# Patient Record
Sex: MALE | Race: WHITE
[De-identification: names, ages, dates, MRNs, and addresses within clinical notes are randomized per-mention and may not be internally consistent; named-entity substitution may affect disease eponyms.]

---

## 2017-07-07 ENCOUNTER — HOSPITAL ENCOUNTER (OUTPATIENT)
Dept: HOSPITAL 45 - C.LAB | Age: 77
Discharge: HOME | End: 2017-07-07
Attending: ORTHOPAEDIC SURGERY
Payer: COMMERCIAL

## 2017-07-07 VITALS
HEIGHT: 65 IN | BODY MASS INDEX: 25.75 KG/M2 | WEIGHT: 154.54 LBS | BODY MASS INDEX: 25.75 KG/M2 | HEIGHT: 65 IN | WEIGHT: 154.54 LBS

## 2017-07-07 DIAGNOSIS — Z01.812: ICD-10-CM

## 2017-07-07 DIAGNOSIS — Z01.811: Primary | ICD-10-CM

## 2017-07-07 LAB
ANION GAP SERPL CALC-SCNC: 6 MMOL/L (ref 3–11)
APPEARANCE UR: CLEAR
BASOPHILS # BLD: 0.02 K/UL (ref 0–0.2)
BASOPHILS NFR BLD: 0.3 %
BILIRUB UR-MCNC: (no result) MG/DL
BUN SERPL-MCNC: 25 MG/DL (ref 7–18)
BUN/CREAT SERPL: 20.6 (ref 10–20)
CALCIUM SERPL-MCNC: 9 MG/DL (ref 8.5–10.1)
CHLORIDE SERPL-SCNC: 106 MMOL/L (ref 98–107)
CO2 SERPL-SCNC: 29 MMOL/L (ref 21–32)
COLOR UR: YELLOW
COMPLETE: YES
CREAT CL PREDICTED SERPL C-G-VRATE: 44.8 ML/MIN
CREAT SERPL-MCNC: 1.2 MG/DL (ref 0.6–1.4)
EOSINOPHIL NFR BLD AUTO: 330 K/UL (ref 130–400)
GLUCOSE SERPL-MCNC: 188 MG/DL (ref 70–99)
HCT VFR BLD CALC: 31.2 % (ref 42–52)
IG%: 0.2 %
IMM GRANULOCYTES NFR BLD AUTO: 12.6 %
INR PPP: 1.1 (ref 0.9–1.1)
LYMPHOCYTES # BLD: 0.77 K/UL (ref 1.2–3.4)
MANUAL MICROSCOPIC REQUIRED?: NO
MCH RBC QN AUTO: 27.3 PG (ref 25–34)
MCHC RBC AUTO-ENTMCNC: 30.8 G/DL (ref 32–36)
MCV RBC AUTO: 88.6 FL (ref 80–100)
MONOCYTES NFR BLD: 8 %
NEUTROPHILS # BLD AUTO: 0.8 %
NEUTROPHILS NFR BLD AUTO: 78.1 %
NITRITE UR QL STRIP: (no result)
PARTIAL THROMBOPLASTIN RATIO: 1.5
PH UR STRIP: 5 [PH] (ref 4.5–7.5)
PMV BLD AUTO: 9.7 FL (ref 7.4–10.4)
POTASSIUM SERPL-SCNC: 4.1 MMOL/L (ref 3.5–5.1)
PROTHROMBIN TIME: 11.6 SECONDS (ref 9–12)
RBC # BLD AUTO: 3.52 M/UL (ref 4.7–6.1)
REVIEW REQ?: NO
SODIUM SERPL-SCNC: 141 MMOL/L (ref 136–145)
SP GR UR STRIP: 1.03 (ref 1–1.03)
URINE BILL WITH OR WITHOUT MIC: (no result)
UROBILINOGEN UR-MCNC: (no result) MG/DL
WBC # BLD AUTO: 6.13 K/UL (ref 4.8–10.8)
ZZUR CULT IF INDIC CLEAN CATCH: NO

## 2017-07-07 NOTE — PAT MEDICATION INSTRUCTIONS
Service Date


Jul 7, 2017.





Current Home Medication List


Acetaminophen (Tylenol), 1,300 MG PO Q6H PRN for RN


Amlodipine (Norvasc), 5 MG PO HS


Atorvastatin (Lipitor), 10 MG PO QPM


Celecoxib (CeleBREX), 200 MG PO PRN


Finasteride (Proscar), 5 MG PO HS


Multivitamin (Multivitamin), 1 TABLET PO QAM


Oxycodone/Acetaminophen 5MG/325MG (Percocet 5MG/325MG), 1 TABLET PO Q4H PRN for 

Pain


Terazosin Hcl (Hytrin), 10 MG PO HS





Medication Instructions


For Your Scheduled Surgery 





- Hold the following medications the morning of surgery:


Celecoxib (CeleBREX), 200 MG PO PRN (otherwise okay to continue per surgeon)


Multivitamin (Multivitamin), 1 TABLET PO QAM











- Take the following medications the morning of surgery with a sip of water 

OTHERWISE NOTHING TO EAT OR DRINK AFTER MIDNIGHT:


Acetaminophen (Tylenol), 1,300 MG PO Q6H PRN  (may take if needed up to 4 hours 

prior to surgery)


Oxycodone/Acetaminophen 5MG/325MG (Percocet 5MG/325MG), 1 TABLET PO Q4H PRN for 

Pain (may take if needed up to 4 hours prior to surgery)











- Take the following medications as scheduled the night before surgery:


Amlodipine (Norvasc), 5 MG PO HS


Terazosin Hcl (Hytrin), 10 MG PO HS


Atorvastatin (Lipitor), 10 MG PO QPM


Finasteride (Proscar), 5 MG PO HS


Acetaminophen (Tylenol), 1,300 MG PO Q6H PRN 


Oxycodone/Acetaminophen 5MG/325MG (Percocet 5MG/325MG),











If you have any questions please call us at [phone] or [phone] or 

[phone]

## 2017-07-07 NOTE — DIAGNOSTIC IMAGING REPORT
CHEST 2 VIEWS ROUTINE



HISTORY:77 yearsMal[age] exam. Patient is asymptomatic. 



COMPARISON: None available.



TECHNIQUE: Frontal and lateral views of the chest.



FINDINGS: 

Cardiomediastinal and hilar silhouettes are within normal limits. There is

atherosclerosis of the aorta. No pneumothorax, pleural effusion or focal

airspace consolidation. No overt pulmonary edema. Lungs are mildly

hyperinflated.



Metallic anchor device of the right humeral head is partially imaged from prior

rotator cuff repair. There has been prior osteotomy of the left distal clavicle.



IMPRESSION: Mild hyperinflation without acute cardiopulmonary process. 







The above report was generated using voice recognition software. It may contain

grammatical, syntax or spelling errors.







Electronically signed by:  Darren Darling

7/7/2017 3:57 PM



Dictated Date/Time:  7/7/2017 3:56 PM

## 2017-07-08 LAB — EST. AVERAGE GLUCOSE BLD GHB EST-MCNC: 134 MG/DL

## 2017-07-11 NOTE — HISTORY AND PHYSICAL
History & Physical


Date & Time of Service:


Jul 11, 2017 at 10:29


Chief Complaint:


Right Hip Degenerative Joint Disease


Primary Care Physician:


Adelfo Dacosta D.O.


History of Present Illness


Source:  patient


Chronic rt hip pain, failed injections, narcotics, tylenol and the use of a 

cane. Dx w end staged OA rt hip radiographically.





Past Medical/Surgical History


HTN


Hyperlipidemia


OA


Acid reflux


BPH


Dental implants





Social History


Smoking Status:  Former Smoker


Alcohol Use:  occasionally


Drug Use:  none


Marital Status:  


Housing status:  lives with family





Multi-Drug Resistant Organisms


History of MDRO:  No





Allergies


Coded Allergies:  


     Lisinopril (Unverified  Allergy, Unknown, COUGH, 7/7/17)





Home Medications


Scheduled


Amlodipine (Norvasc), 5 MG PO HS


Atorvastatin (Lipitor), 10 MG PO QPM


Celecoxib (CeleBREX), 200 MG PO PRN


Finasteride (Proscar), 5 MG PO HS


Multivitamin (Multivitamin), 1 TABLET PO QAM


Terazosin Hcl (Hytrin), 10 MG PO HS





Scheduled PRN


Acetaminophen (Tylenol), 1,300 MG PO Q6H PRN for RN


Oxycodone/Acetaminophen 5MG/325MG (Percocet 5MG/325MG), 1 TABLET PO Q4H PRN for 

Pain





Review of Systems


Musculoskeletal:  + joint pain, + muscle pain (rt hip)





Physical Exam


General Appearance:  WD/WN, no apparent distress


Head:  normocephalic, atraumatic


Eyes:  normal inspection, PERRL


ENT:  normal ENT inspection


Respiratory/Chest:  lungs clear


Cardiovascular:  regular rate, rhythm


Abdomen/GI:  normal bowel sounds, non tender


+pain with PROM at the rt hip, + flexion contracture, rt hip, N/V+, 4/5 

strength w pain





Impression


Assessment and Plan


Right hip OA


HTN


Hyperlipidemia


OA


Acid reflux


BPH


Dental implants





Advanced Directives


Existing Living Will:  No


Existing Power of :  No





Note


Plan for Right PIETRO

## 2017-08-28 LAB
INR PPP: 1 (ref 0.9–1.1)
PARTIAL THROMBOPLASTIN RATIO: 1.4
PROTHROMBIN TIME: 10.6 SECONDS (ref 9–12)

## 2017-08-28 NOTE — DIAGNOSTIC IMAGING REPORT
LATERAL RADIOGRAPHS OF THE CERVICAL SPINE, INCLUDING FLEXION AND EXTENSION



CLINICAL HISTORY: PREOP, RHEUMATOID ARTHRITIS    



COMPARISON STUDY:  No previous studies for comparison.



FINDINGS: Slight anterolisthesis of C7 on T1 is noted. This does not change with

flexion or extension. C1-C2 alignment is anatomic. There is no evidence for

cervical spine instability during flexion or extension. There is moderate disc

space narrowing and osteophytosis at C5-6 and C6-C7. No fracture or suspicious

lesion is identified on lateral projections.



IMPRESSION:  



1. No evidence for cervical spine instability during flexion or extension.



2. Moderate multilevel degenerative disc disease and facet arthrosis, most

pronounced at C5-C6 and C6-C7.



3. Slight anterolisthesis of C7 on T1 which does not change with flexion or

extension. 









Electronically signed by:  Devon Casiano M.D.

8/28/2017 4:03 PM



Dictated Date/Time:  8/28/2017 4:02 PM

## 2017-08-28 NOTE — PAT MEDICATION INSTRUCTIONS
Service Date


Aug 28, 2017.





Current Home Medication List


Acetaminophen (Acetaminophen Extra Stren), 2 TAB PO Q6 PRN for Pain


Ferrous Sulfate (Kp Ferrous Sulfate), 1 TAB PO BID


Finasteride (Proscar), 5 MG PO HS


Folic Acid (Folic Acid), 1 TAB PO QAM


Methotrexate (Methotrexate), 2.5 MG PO WK


Multivitamin (Multivitamin), 1 TABLET PO QAM


Omeprazole (Prilosec), 20 MG PO QAM


Pseudoephedrine (Sudafed), 30 MG PO DAILY PRN for stuffiness


Terazosin Hcl (Hytrin), 10 MG PO HS


Tramadol (Ultram), 50 MG PO Q4H PRN for Pain





Medication Instructions


For Your Scheduled Surgery 





- Check with surgeon/rheumatologist for instructions: 


Methotrexate (Methotrexate), 2.5 MG PO WK








- Hold the following medications the morning of surgery:


Pseudoephedrine (Sudafed), 30 MG PO DAILY PRN for stuffiness


Multivitamin (Multivitamin), 1 TABLET PO QAM


Folic Acid (Folic Acid), 1 TAB PO QAM


Ferrous Sulfate (Kp Ferrous Sulfate), 1 TAB PO BID








- Take the following medications the morning of surgery with a sip of water:


Acetaminophen (Acetaminophen Extra Stren), 2 TAB PO Q6 PRN for Pain  (if needed)


Omeprazole (Prilosec), 20 MG PO QAM


Tramadol (Ultram), 50 MG PO Q4H PRN for Pain(okay to take up to 4 hours prior 

to surgery if needed)








- Take the following medications as scheduled the night before surgery:


Tramadol (Ultram), 50 MG PO Q4H PRN for Pain   (if needed)


Terazosin Hcl (Hytrin), 10 MG PO HS


Pseudoephedrine (Sudafed), 30 MG PO DAILY PRN for stuffiness   (if needed)


Finasteride (Proscar), 5 MG PO HS


Ferrous Sulfate (Kp Ferrous Sulfate), 1 TAB PO BID








If you have any questions please call us at [phone] or [phone] or 

[phone]

## 2017-09-05 NOTE — HISTORY & PHYSICAL EXAMINATION
DATE OF ADMISSION:  09/05/2017

 

CHIEF COMPLAINT:  Chronic right hip pain.

 

HISTORY OF PRESENT ILLNESS:  This is a 77-year-old male patient of Dr. Cooper, who is complaining of chronic right hip pain, longstanding, now

progressively getting worse.  The patient has been diagnosed with end-stage

osteoarthritis per clinical and radiographic exams.  The patient has failed

conservative treatment including, intraarticular injections, use of narcotic

medication, see the medicine and the use of a walker.  The patient has

increased pain with weightbearing activities.  His pain does interfere with

his activities of daily living.  The patient wishes to proceed with a right

total hip arthroplasty.

 

PAST MEDICAL HISTORY:  Osteoarthritis, acid reflux, BPH, otherwise a healthy

77-year-old male.

 

SOCIAL HISTORY:  Nonsmoker, occasional drinker.

 

PAST SURGICAL HISTORY:  Shoulder surgery x3, hernia surgery, and carpal

tunnel surgery.

 

FAMILY HISTORY:  Noncontributory.

 

REVIEW OF SYSTEMS:  The patient complains of chronic right hip pain. 

Otherwise, denies any shortness of breath, chest pain, nausea, vomiting or

any other joint complaints.

 

MEDICATIONS:

1.  Finasteride 5 mg daily.

2.  Terazosin 10 mg daily.

3.  Methotrexate 2.5 mg six per week.

4.  Omeprazole 20 mg daily.

5.  Tylenol as needed.

6.  Tramadol as needed.

7.  Folic acid 1 mg daily.

8.  Ferrous sulfate 325 b.i.d.

 

ALLERGIES:  No known drug allergies.

 

PHYSICAL EXAMINATION:

GENERAL:  Well-developed, well-nourished 77-year-old male, in no acute

distress.  He is alert and oriented x3 and pleasant.

HEENT:  Normocephalic, atraumatic.  Extraocular motions are intact.  Pupils

are equal and reactive to light.

HEART:  Regular rate and rhythm, no murmurs appreciated.

LUNGS:  Clear.

ABDOMEN:  Soft, nontender, bowel sounds present.

EXTREMITIES:  Right hip reveals a range of motion of negative 15 to 115.  He

does have crepitation, soreness with pain to passive range of motion.  He has

4/5 strength in his lower right extremity.

NEUROLOGIC:  Neurovascularly, he is intact in his right lower extremity.

 

DIAGNOSES:

1.  Right hip end-stage osteoarthritis, failure of conservative treatment

with a history of osteoarthritis.

2.  Acid reflux.

3.  Benign prostatic hyperplasia.

 

PLAN:  The patient was advised of his diagnosis.  Indications, risks,

benefits, and postop course have all been reviewed.  The patient wishes to

proceed with right total hip arthroplasty.  Necessary consent forms,

preoperative testing and clearances will be obtained.

## 2017-09-06 ENCOUNTER — HOSPITAL ENCOUNTER (INPATIENT)
Dept: HOSPITAL 45 - C.ACU | Age: 77
LOS: 3 days | Discharge: HOME HEALTH | DRG: 470 | End: 2017-09-09
Attending: ORTHOPAEDIC SURGERY | Admitting: ORTHOPAEDIC SURGERY
Payer: COMMERCIAL

## 2017-09-06 VITALS
TEMPERATURE: 99.14 F | HEART RATE: 88 BPM | SYSTOLIC BLOOD PRESSURE: 129 MMHG | DIASTOLIC BLOOD PRESSURE: 85 MMHG | OXYGEN SATURATION: 94 %

## 2017-09-06 VITALS
OXYGEN SATURATION: 96 % | SYSTOLIC BLOOD PRESSURE: 94 MMHG | TEMPERATURE: 97.7 F | DIASTOLIC BLOOD PRESSURE: 54 MMHG | HEART RATE: 82 BPM

## 2017-09-06 VITALS — DIASTOLIC BLOOD PRESSURE: 69 MMHG | SYSTOLIC BLOOD PRESSURE: 105 MMHG

## 2017-09-06 VITALS
DIASTOLIC BLOOD PRESSURE: 70 MMHG | HEART RATE: 84 BPM | TEMPERATURE: 98.96 F | SYSTOLIC BLOOD PRESSURE: 118 MMHG | OXYGEN SATURATION: 98 %

## 2017-09-06 VITALS
SYSTOLIC BLOOD PRESSURE: 120 MMHG | DIASTOLIC BLOOD PRESSURE: 70 MMHG | HEART RATE: 68 BPM | TEMPERATURE: 97.88 F | OXYGEN SATURATION: 99 %

## 2017-09-06 VITALS
OXYGEN SATURATION: 99 % | HEART RATE: 71 BPM | DIASTOLIC BLOOD PRESSURE: 80 MMHG | TEMPERATURE: 97.7 F | SYSTOLIC BLOOD PRESSURE: 131 MMHG

## 2017-09-06 VITALS
TEMPERATURE: 97.7 F | HEART RATE: 70 BPM | OXYGEN SATURATION: 98 % | DIASTOLIC BLOOD PRESSURE: 66 MMHG | SYSTOLIC BLOOD PRESSURE: 111 MMHG

## 2017-09-06 VITALS
BODY MASS INDEX: 24.5 KG/M2 | WEIGHT: 147.05 LBS | WEIGHT: 147.05 LBS | BODY MASS INDEX: 24.5 KG/M2 | HEIGHT: 65 IN | HEIGHT: 65 IN | BODY MASS INDEX: 24.5 KG/M2

## 2017-09-06 VITALS
SYSTOLIC BLOOD PRESSURE: 100 MMHG | DIASTOLIC BLOOD PRESSURE: 54 MMHG | OXYGEN SATURATION: 96 % | HEART RATE: 70 BPM | TEMPERATURE: 98.06 F

## 2017-09-06 DIAGNOSIS — M06.9: ICD-10-CM

## 2017-09-06 DIAGNOSIS — K21.9: ICD-10-CM

## 2017-09-06 DIAGNOSIS — Z79.52: ICD-10-CM

## 2017-09-06 DIAGNOSIS — N40.0: ICD-10-CM

## 2017-09-06 DIAGNOSIS — Z82.49: ICD-10-CM

## 2017-09-06 DIAGNOSIS — M16.11: Primary | ICD-10-CM

## 2017-09-06 DIAGNOSIS — Z87.891: ICD-10-CM

## 2017-09-06 DIAGNOSIS — D62: ICD-10-CM

## 2017-09-06 DIAGNOSIS — Z51.81: ICD-10-CM

## 2017-09-06 DIAGNOSIS — Z79.899: ICD-10-CM

## 2017-09-06 PROCEDURE — 0SR90JZ REPLACEMENT OF RIGHT HIP JOINT WITH SYNTHETIC SUBSTITUTE, OPEN APPROACH: ICD-10-PCS | Performed by: ORTHOPAEDIC SURGERY

## 2017-09-06 RX ADMIN — CEFAZOLIN SCH MLS/HR: 10 INJECTION, POWDER, FOR SOLUTION INTRAVENOUS at 20:12

## 2017-09-06 RX ADMIN — OXYCODONE HYDROCHLORIDE SCH MG: 10 TABLET, FILM COATED, EXTENDED RELEASE ORAL at 18:16

## 2017-09-06 RX ADMIN — FINASTERIDE SCH MG: 5 TABLET, FILM COATED ORAL at 21:08

## 2017-09-06 RX ADMIN — TRANEXAMIC ACID SCH MLS/HR: 100 INJECTION, SOLUTION INTRAVENOUS at 11:18

## 2017-09-06 RX ADMIN — DEXTROSE MONOHYDRATE, SODIUM CHLORIDE, AND POTASSIUM CHLORIDE SCH MLS/HR: 50; 4.5; 1.49 INJECTION, SOLUTION INTRAVENOUS at 17:20

## 2017-09-06 RX ADMIN — ACETAMINOPHEN SCH MG: 500 TABLET, COATED ORAL at 21:40

## 2017-09-06 RX ADMIN — DOCUSATE SODIUM SCH MG: 100 CAPSULE, LIQUID FILLED ORAL at 21:09

## 2017-09-06 RX ADMIN — FERROUS GLUCONATE SCH MG: 324 TABLET ORAL at 18:16

## 2017-09-06 RX ADMIN — TRANEXAMIC ACID SCH MLS/HR: 100 INJECTION, SOLUTION INTRAVENOUS at 17:28

## 2017-09-06 NOTE — MEDICAL CONSULT
Consultation


Date of Consultation:


Sep 6, 2017.


Attending Physician:


Flo Cooper M.D.


Reason for Consultation:


Post Op Medical Management


History of Present Illness


77 year old male who is s/p right PIETRO today by Dr. Cooper. Patient has been 

having increasing right hip pain for the past several months. He failed 

outpatient conservative measures and therefore presented for the planned 

procedure today. Post operatively the patient is doing well. He reports his 

pain is well controlled. He denies numbness or tingling to the RLE. No chest 

pain or shortness of breath. He denies lightheadedness and dizziness. No 

abdominal pain or nausea. He has been ambulating in the halls.





Past Medical/Surgical History


Medical Problems:


(1) BPH (benign prostatic hyperplasia)


Status: Chronic  





(2) Renal cyst


Status: Chronic  





(3) Rheumatoid arthritis


Status: Chronic  





Surgical Problems:


(1) H/O inguinal hernia repair


Status: Chronic  





(2) Hx of nasal septoplasty


Status: Chronic  





(3) S/P carpal tunnel release


Status: Chronic  





(4) S/P rotator cuff repair


Status: Chronic  








Family History





FH: CAD (coronary artery disease)


  BROTHER





Social History


Smoking Status:  Former Smoker


Alcohol Use:  none





Allergies


Coded Allergies:  


     Lisinopril (Verified  Adverse Reaction, Unknown, COUGH, 9/6/17)





Home Medications


Sudafed (Pseudoephedrine HCl) 30 Mg Tab 30 Mg PO DAILY PRN


Kp Ferrous Sulfate (Ferrous Sulfate) 325 Mg Tab 1 Tab PO BID 30 Days


Folic Acid 1 Mg Tab 1 Tab PO QAM


Ultram (Tramadol HCl) 50 Mg Tab 50 Mg PO Q4H PRN


Prilosec (Omeprazole) 20 Mg Capcr 20 Mg PO QAM


Methotrexate 2.5 Mg Tab 2.5 Mg PO WK


Acetaminophen Extra Stren (Acetaminophen) 500 Mg Tab 2 Tab PO Q6 PRN


Proscar (Finasteride) 5 Mg Tab 5 Mg PO HS


Multivitamin (Multivitamins)  Tab 1 Tablet PO QAM


Hytrin (Terazosin Hcl) 10 Mg Cap 10 Mg PO HS





Current Inpatient Medications





Current Inpatient Medications








 Medications


  (Trade)  Dose


 Ordered  Sig/Juaquin


 Route  Start Time


 Stop Time Status Last Admin


Dose Admin


 


 Potassium


 Chloride/Dextrose/


 Sod Cl  1,000 ml @ 


 100 mls/hr  Q10H


 IV  9/6/17 17:00


 9/7/17 16:59  9/6/17 17:20


100 MLS/HR


 


 Miscellaneous


 Medication


  (No Nsaids)  1 ea  UD


 N/A  9/6/17 15:00


 10/6/17 14:59   


 


 


 Oxycodone HCl


  (Roxicodone


 Immediate Rel Tab)  1 TABLET


 FOR PAIN


 RATING...  Q4H  PRN


 PO  9/6/17 15:00


 9/20/17 14:59   


 


 


 Morphine Sulfate


  (MoRPHine


 SULFATE INJ)  2 mg  [id number]  PRN


 IV  9/6/17 15:00


 9/20/17 14:59   


 


 


 Acetaminophen


  (Tylenol Tab)  1,000 mg  Q8


 PO  9/6/17 22:00


 10/6/17 21:59   


 


 


 Magnesium


 Hydroxide


  (Milk Of


 Magnesia Susp)  30 ml  Q6H  PRN


 PO  9/6/17 15:00


 10/6/17 14:59   


 


 


 Docusate Sodium


  (coLACE CAP)  100 mg  BID


 PO  9/6/17 21:00


 10/6/17 20:59   


 


 


 Diphenhydramine


 HCl


  (Benadryl Cap)  25 mg  Q8H  PRN


 PO  9/6/17 15:00


 10/6/17 14:59   


 


 


 Diphenhydramine


 HCl


  (Benadryl Inj)  25 mg  Q8H  PRN


 IV  9/6/17 15:00


 10/6/17 14:59   


 


 


 Al Hydrox/Mg


 Hydrox/Simethicone


  (Maalox Max Susp)  15 ml  Q4H  PRN


 PO  9/6/17 15:00


 10/6/17 14:59   


 


 


 Zolpidem Tartrate


  (Ambien Tab)  5 mg  HSZ  PRN


 PO  9/6/17 15:00


 10/6/17 14:59   


 


 


 Multivitamins


  (Multivitamin


 Tab)  1 tab  QAM


 PO  9/7/17 09:00


 10/7/17 08:59   


 


 


 Ferrous Gluconate


  (Ferrous


 Gluconate Tab)  324 mg  TIDM


 PO  9/6/17 17:45


 10/6/17 17:59  9/6/17 18:16


324 MG


 


 Pantoprazole


 Sodium


  (Protonix Tab)  40 mg  QAM


 PO  9/7/17 09:00


 10/7/17 08:59   


 


 


 Cefazolin Sodium


 1000 mg/Dextrose  55 ml @ 


 100 mls/hr  Q8H


 IV  9/6/17 20:00


 9/7/17 04:32   


 


 


 Oxycodone HCl


  (Oxycontin Tab)  10 mg  Q12H


 PO  9/6/17 18:00


 9/20/17 17:59  9/6/17 18:16


10 MG


 


 Finasteride


  (Proscar Tab)  5 mg  HS


 PO  9/6/17 21:00


 10/6/17 20:59   


 


 


 Folic Acid


  (Folvite Tab)  1 mg  QAM


 PO  9/7/17 09:00


 10/7/17 08:59   


 


 


 Pseudoephedrine


 HCl


  (Sudafed Tab)  30 mg  DAILY  PRN


 PO  9/6/17 15:00


 10/6/17 14:59   


 


 


 Terazosin HCl


  (Hytrin Cap)  10 mg  HS


 PO  9/6/17 21:00


 10/6/17 20:59   


 


 


 Rivaroxaban


  (Xarelto Tab)  10 mg  Q24H


 PO  9/7/17 15:00


 10/7/17 14:59   


 











Review of Systems


ROS per HPI, all other systems reviewed and negative





Physical Exam











  Date Time  Temp Pulse Resp B/P (MAP) Pulse Ox O2 Delivery O2 Flow Rate FiO2


 


9/6/17 18:54 36.5 82 18 94/54 (67) 96 Room Air  


 


9/6/17 17:53 37.2 84 18 118/70 (86) 98 Nasal Cannula 2.0 


 


9/6/17 16:50 36.5 70 18 111/66 (81) 98 Nasal Cannula 2.0 


 


9/6/17 16:28 36.5 71 18 131/80 (97) 99 Nasal Cannula 2.0 


 


9/6/17 16:15      Nasal Cannula 2.0 


 


9/6/17 15:45     99 Nasal Cannula 2.0 


 


9/6/17 15:45 36.6 68 18 120/70 (87) 99 Nasal Cannula 2.0 


 


9/6/17 15:39 37.5       


 


9/6/17 15:37  66 15  99   


 


9/6/17 15:37  65 15     


 


9/6/17 15:36    126/77    


 


9/6/17 15:32  66 17  99   


 


9/6/17 15:32  66 17     


 


9/6/17 15:31    128/76    


 


9/6/17 15:27  66 20     


 


9/6/17 15:27  66 20  97   


 


9/6/17 15:26    128/76    


 


9/6/17 15:22  66 15     


 


9/6/17 15:22  66 15  97   


 


9/6/17 15:21    121/72    


 


9/6/17 15:17  65 20     


 


9/6/17 15:17  66 20  97   


 


9/6/17 15:16    119/71    


 


9/6/17 15:12  68 14  95   


 


9/6/17 15:12  65 14     


 


9/6/17 15:11    127/73    


 


9/6/17 15:08 37.5       


 


9/6/17 15:07  64 13  97   


 


9/6/17 15:07  65 13     


 


9/6/17 15:06  64 18     


 


9/6/17 15:06  64 18 112/76 97   


 


9/6/17 15:01  64 15     


 


9/6/17 15:01  65 15 120/70 98   


 


9/6/17 15:00  63 16  98   


 


9/6/17 15:00  63 16     


 


9/6/17 14:56    122/74    


 


9/6/17 14:55  63 13  98   


 


9/6/17 14:55  64 13     


 


9/6/17 14:51    107/71    


 


9/6/17 14:50  64 14     


 


9/6/17 14:50  63 14  97   


 


9/6/17 14:49  62 16  98   


 


9/6/17 14:49  62 16     


 


9/6/17 14:46    124/71    


 


9/6/17 14:44  67 23  97   


 


9/6/17 14:44  67 23     


 


9/6/17 14:41    118/67    


 


9/6/17 14:40    116/73    


 


9/6/17 14:39  75 22  92   


 


9/6/17 14:39  70 22     


 


9/6/17 14:39 37.2 71 16 116/73 95 Nasal Cannula 2 


 


9/6/17 09:51 37.3 88 20 129/85 94 Room Air  








General Appearance:  no apparent distress


Head:  normocephalic, atraumatic


Eyes:  normal inspection, sclerae normal


ENT:  hearing grossly normal


Neck:  supple, no JVD


Respiratory/Chest:  lungs clear, normal breath sounds, no respiratory distress


Cardiovascular:  regular rate, rhythm, no edema, normal peripheral pulses


Abdomen/GI:  normal bowel sounds, non tender, soft


Extremities/Musculoskelatal:  + pertinent finding (s/p right hip surgery, 

surgical dressing intact, CSM checks intact to RLE )


Neurologic/Psych:  no motor/sensory deficits, alert, normal mood/affect, 

oriented x 3


Skin:  normal color, warm/dry





Assessment & Plan


S/P RIGHT PIETRO


- POD#0


- activity and wound care orders as per ortho


- pain control with bowel regimen


- PT/OT


- monitor H/H for acute blood loss anemia and transfuse blood products PRN





RHEUMATOID ARTHRITIS


- methotrexate on hold during the immediate post op period 





BPH


- continue terazosin and finasteride 





DVT PROPHYLAXIS 


- Xarelto as per ortho 





Thank you for this consultation.


We will follow the patient with you during their hospital stay.


You can reach a member of the [facility] Hospitalist Team 24/7 via pager @ [phone].





Attending addendum:





Agree with the above H&P consultation; please refer to above for more details. 





Patient is a 76 yo male who is post op from R PIETRO; he states that he was in 

severe pain prior to surgery and had failed conservative measures. He was just 

seen ambulating in the halls with a walker and minimum assistance. He denies 

any complaints other than a pulling sensation at the incision/drain sites. No 

pain. 





Cardiac: RR, S1 and S2 auscultated


Resp: CTA B/L no wheezes, rales, rhonchi


GI: soft, NT, ND, +BS





POSTOPERATIVE STATE: S/P R PIETRO


-pain control, activity, DVT prophylaxis as per primary team


-will monitor for post op anemia of blood loss


-encourage bowel regimen


-encourage IS 


-resume home meds as above

## 2017-09-06 NOTE — DIAGNOSTIC IMAGING REPORT
SINGLE VIEW PELVIS; SINGLE VIEW RIGHT HIP



CLINICAL HISTORY: Postoperative examination.



FINDINGS: An AP portable view of the hips and pelvis with a crosstable lateral

portable view of the right hip are obtained.  A bipolar right hip arthroplasty

is in near-anatomic alignment. At least 2 cortical lag screws transfixes the

acetabular cup. No acute fracture is identified. Mild arthritic change is seen

in the left hip. There are expected postoperative changes overlying the right

hip including skin clips, subcutaneous gas, a surgical drain, and soft tissue

swelling. Calcified phleboliths are seen in the pelvis.



IMPRESSION: Expected postoperative findings status post right hip arthroplasty.

No acute fracture is seen.







Electronically signed by:  Arnaldo Handy M.D.

9/6/2017 3:33 PM



Dictated Date/Time:  9/6/2017 3:33 PM

## 2017-09-06 NOTE — ANESTHESIOLOGY PROGRESS NOTE
Anesthesia Post Op Note


Date & Time


Sep 6, 2017 at 14:59





Vital Signs


Pain Intensity:  0





Vital Signs Past 12 Hours








  Date Time  Temp Pulse Resp B/P (MAP) Pulse Ox O2 Delivery O2 Flow Rate FiO2


 


9/6/17 14:49  62 16  98   


 


9/6/17 14:49  62 16     


 


9/6/17 14:46    124/71    


 


9/6/17 14:44  67 23  97   


 


9/6/17 14:44  67 23     


 


9/6/17 14:41    118/67    


 


9/6/17 14:40    116/73    


 


9/6/17 14:39  75 22  92   


 


9/6/17 14:39  70 22     


 


9/6/17 14:39 37.2 71 16 116/73 95 Nasal Cannula 2 


 


9/6/17 09:51 37.3 88 20 129/85 94 Room Air  











Notes


Mental Status:  alert / awake / arousable, participated in evaluation


Pt Amnestic to Procedure:  Yes


Nausea / Vomiting:  adequately controlled


Pain:  adequately controlled


Airway Patency, RR, SpO2:  stable & adequate


BP & HR:  stable & adequate


Hydration State:  stable & adequate


Neuraxial Anesthesia:  was administered, sensory block is resolving


Anesthetic Complications:  no major complications apparent

## 2017-09-06 NOTE — [facility] OPERATIVE REPORT
Operative Report


Operative Date


Sep 6, 2017.





Pre-Operative Diagnosis





Right hip end-stage osteoarthritis





Post-Operative Diagnosis


same





Procedure(s) Performed


Right total hip arthroplasty





Surgeon


Dr Cooper





Assistant Surgeon(s)


Mekhi Santos PA-C





Estimated Blood Loss


100cc





Findings


end stage osteoarthritis arthritis and rheumatoid arthritis with grade 4 

degenerative arthritis possible traumatic chondral injury to acetabulum





Specimens





As per Surgeon





A. Right Femoral Head





Drains


2 Hemovac





Anesthesia


spinal, sedation,orhtomix





Complication(s)


None





Disposition


Recovery Room / PACU





Indications


End-stage arthritis right hip





Description of Procedure


Patient taken to the operating room and anesthetized under spinal anesthesia.  

Patient was placed supine on the operating table.  Exam of the involved 

extremity demonstrated he had some decreased internal rotation and flexion he 

also has some flexion contracture of his right knee.  He was a thin 

individual..  Patient was placed on a sacral pad the involved leg was placed on 

a foot bump to flex knee 90 and  hip 60.  A Gates-type approach was 

performed to the right hip.  A longitudinal lateral incision was made over the 

hip.  The skin was incised sharply.  The fat was divided down to the fascia.  

Subcutaneous bleeders are cauterized.  There was some thickened trochanteric 

bursa.  Trochanter bursa was resected.  The gluteus medius was noted to be 

intact..  A split was made in the gluteus medius muscle between the anterior 40

% and posterior 60%.  The minimus was divided longitudinally reflected off the 

underlying capsule.  The capsule was incised down to the hip joint.  Intra-

articular findings demonstrated grade 4 wear on the superior head of the femur 

with surrounding osteophytes and the acetabulum had a large cleft with some 

articular cartilage loss possibly from old trauma and a hypertrophic labrum 

with circumferential osteophytes and synovium that had significant synovitis 

and inflammatory changes consistent with rheumatoid arthritis..  An incision 

was made through the gluteus medius leaving a cuff of tendon for repair on the 

greater trochanter.  The vastus lateralis was split longitudinally for about 3 

cm.  A muscular capsular flap was elevated off the hip.  The hip was dislocated 

with use of bone hook with flexion and external rotation.  The femoral neck cut 

was made approximately 15 mm proximal to the lesser trochanter in neutral 

anteversion.  Head and neck fragment were removed.  T A sharp Swapna superior 

tractor was placed and a a blunt Swapna retractor was placed anteriorly a double 

angled inferior retractor was placed on the ishium.   The acetabular labrum was 

resected all osteophytes were resected soft tissue in the acetabular fossa was 

resected.  An intracapsular release was performed.  Some the capsule was 

resected for exposure.  The first reamer  48 mm was used to medialize reaming 

to the inner table and then sequential reamers for the acetabulum were used in 

2 mm increments up to a size 58 mm reamer.  I used the [employer] total hip 

arthroplasty system using a PSL type cup.  Trial reduction demonstrated a 58  

millimeter cup was the appropriate size and fit.  The placement of the final 

implant was performed after irrigating the acetabulum with antibiotic solution 

with pulsatile lavage.  The position of the cup was approximately 15 

anteversion 45 abduction.  Good fixation was performed.  2 screws were placed 

in the posterior superior quadrant for further fixation through the cup.  The 

acetabular liner was impacted into position.  The 36 mm G 10  acetabular liner 

was used.  Retractors removed and attention was taken to the femur.  The femur 

was exposed with flexion external rotation.  Canal reamer was used followed by 

sequential broaches up to a size 4 with 127 neck angle.  This had a good fit 

and fill.  Trial reduction was performed on 127 neck angle based on 

preoperative templating.  A +2.5 neck length gave equal leg lengths and stable 

range of motion through full flexion flexion adduction and internal rotation 

and extension and external rotation.  The trials removed a Betadine soak was 

performed the hip was copiously irrigated with antibiotic solution with 

bacitracin the anesthetic cocktail was injected and after irrigation again and 

the final implant was impacted which was the Accolade 2 size #4 with 127 neck 

angle.  The Biolox ceramic head size 36 with +2.5 mm neck was used.  After 

final implants were placed the reduction was noted to be stable.  2 drains were 

placed deep.  These were brought out laterally and connected to Hemovac.  The 

minimus was closed with interrupted figure-of-eight #1 Vicryl sutures.  The 

medius was closed with transosseous #5 FiberWire sutures using Seamus Wilmer 

stitch technique.  Lateral row soft tissue repair was performed with figure of 

8 #2 FiberWire sutures.  The medius split was closed with interrupted figure-of-

eight #1 Vicryl sutures.  The vastus lateralis was closed with interrupted 

figure eight Vicryl sutures.  The fascia sunil was closed with interrupted 

figure of 8 number 1 Vicryl sutures.  The fat was closed with figure-of-eight #

2 Vicryl sutures.  Skin was closed with staples sterile silvalon dressing was 

applied.  The patient tolerated procedure well.Mekhi WELSH , my physician 

assistant assisted me in the procedure with patient positioning And draping 

soft tissue retraction instrument management suture management and assisted in 

the outer layer closure and will participate in the postoperative care the 

patient thank you.


I attest to the content of the Intraoperative Record and any orders documented 

therein.  Any exceptions are noted below.

## 2017-09-07 VITALS
DIASTOLIC BLOOD PRESSURE: 68 MMHG | TEMPERATURE: 98.24 F | SYSTOLIC BLOOD PRESSURE: 145 MMHG | OXYGEN SATURATION: 95 % | HEART RATE: 98 BPM

## 2017-09-07 VITALS
TEMPERATURE: 99.14 F | SYSTOLIC BLOOD PRESSURE: 128 MMHG | OXYGEN SATURATION: 95 % | HEART RATE: 85 BPM | DIASTOLIC BLOOD PRESSURE: 69 MMHG

## 2017-09-07 VITALS — SYSTOLIC BLOOD PRESSURE: 104 MMHG | HEART RATE: 91 BPM | DIASTOLIC BLOOD PRESSURE: 64 MMHG | OXYGEN SATURATION: 98 %

## 2017-09-07 VITALS
SYSTOLIC BLOOD PRESSURE: 115 MMHG | DIASTOLIC BLOOD PRESSURE: 66 MMHG | TEMPERATURE: 97.7 F | OXYGEN SATURATION: 96 % | HEART RATE: 67 BPM

## 2017-09-07 VITALS
TEMPERATURE: 98.24 F | HEART RATE: 81 BPM | SYSTOLIC BLOOD PRESSURE: 103 MMHG | DIASTOLIC BLOOD PRESSURE: 55 MMHG | OXYGEN SATURATION: 96 %

## 2017-09-07 VITALS
HEART RATE: 65 BPM | OXYGEN SATURATION: 95 % | SYSTOLIC BLOOD PRESSURE: 127 MMHG | TEMPERATURE: 98.24 F | DIASTOLIC BLOOD PRESSURE: 60 MMHG

## 2017-09-07 LAB
ANION GAP SERPL CALC-SCNC: 7 MMOL/L (ref 3–11)
ANISOCYTOSIS BLD QL SMEAR: PRESENT
BASOPHILS # BLD: 0.01 K/UL (ref 0–0.2)
BASOPHILS NFR BLD: 0.1 %
BUN SERPL-MCNC: 23 MG/DL (ref 7–18)
BUN/CREAT SERPL: 20.5 (ref 10–20)
CALCIUM SERPL-MCNC: 8.7 MG/DL (ref 8.5–10.1)
CHLORIDE SERPL-SCNC: 105 MMOL/L (ref 98–107)
CO2 SERPL-SCNC: 26 MMOL/L (ref 21–32)
COMPLETE: YES
CREAT CL PREDICTED SERPL C-G-VRATE: 48.9 ML/MIN
CREAT SERPL-MCNC: 1.1 MG/DL (ref 0.6–1.4)
EOSINOPHIL NFR BLD AUTO: 326 K/UL (ref 130–400)
GLUCOSE SERPL-MCNC: 175 MG/DL (ref 70–99)
HCT VFR BLD CALC: 26.4 % (ref 42–52)
IG%: 0.4 %
IMM GRANULOCYTES NFR BLD AUTO: 4.8 %
INR PPP: 1.1 (ref 0.9–1.1)
LYMPHOCYTES # BLD: 0.58 K/UL (ref 1.2–3.4)
MCH RBC QN AUTO: 28.7 PG (ref 25–34)
MCHC RBC AUTO-ENTMCNC: 31.8 G/DL (ref 32–36)
MCV RBC AUTO: 90.1 FL (ref 80–100)
MONOCYTES NFR BLD: 8.4 %
NEUTROPHILS # BLD AUTO: 0 %
NEUTROPHILS NFR BLD AUTO: 86.3 %
NEUTS HYPERSEG BLD QL SMEAR: (no result)
PMV BLD AUTO: 9.5 FL (ref 7.4–10.4)
POTASSIUM SERPL-SCNC: 4.3 MMOL/L (ref 3.5–5.1)
PROTHROMBIN TIME: 11.3 SECONDS (ref 9–12)
RBC # BLD AUTO: 2.93 M/UL (ref 4.7–6.1)
SODIUM SERPL-SCNC: 138 MMOL/L (ref 136–145)
WBC # BLD AUTO: 12.21 K/UL (ref 4.8–10.8)

## 2017-09-07 RX ADMIN — FINASTERIDE SCH MG: 5 TABLET, FILM COATED ORAL at 21:31

## 2017-09-07 RX ADMIN — CEFAZOLIN SCH MLS/HR: 10 INJECTION, POWDER, FOR SOLUTION INTRAVENOUS at 04:19

## 2017-09-07 RX ADMIN — FERROUS GLUCONATE SCH MG: 324 TABLET ORAL at 08:33

## 2017-09-07 RX ADMIN — Medication SCH TAB: at 08:33

## 2017-09-07 RX ADMIN — DOCUSATE SODIUM SCH MG: 100 CAPSULE, LIQUID FILLED ORAL at 20:40

## 2017-09-07 RX ADMIN — OXYCODONE HYDROCHLORIDE SCH MG: 10 TABLET, FILM COATED, EXTENDED RELEASE ORAL at 18:02

## 2017-09-07 RX ADMIN — FERROUS GLUCONATE SCH MG: 324 TABLET ORAL at 11:38

## 2017-09-07 RX ADMIN — OXYCODONE HYDROCHLORIDE SCH MG: 10 TABLET, FILM COATED, EXTENDED RELEASE ORAL at 05:53

## 2017-09-07 RX ADMIN — OXYCODONE HYDROCHLORIDE PRN MG: 5 TABLET ORAL at 20:40

## 2017-09-07 RX ADMIN — FERROUS GLUCONATE SCH MG: 324 TABLET ORAL at 18:02

## 2017-09-07 RX ADMIN — DEXTROSE MONOHYDRATE, SODIUM CHLORIDE, AND POTASSIUM CHLORIDE SCH MLS/HR: 50; 4.5; 1.49 INJECTION, SOLUTION INTRAVENOUS at 02:58

## 2017-09-07 RX ADMIN — ACETAMINOPHEN SCH MG: 500 TABLET, COATED ORAL at 05:53

## 2017-09-07 RX ADMIN — PANTOPRAZOLE SCH MG: 40 TABLET, DELAYED RELEASE ORAL at 08:33

## 2017-09-07 RX ADMIN — ACETAMINOPHEN SCH MG: 500 TABLET, COATED ORAL at 13:45

## 2017-09-07 RX ADMIN — DOCUSATE SODIUM SCH MG: 100 CAPSULE, LIQUID FILLED ORAL at 08:32

## 2017-09-07 RX ADMIN — OXYCODONE HYDROCHLORIDE PRN MG: 5 TABLET ORAL at 23:42

## 2017-09-07 RX ADMIN — RIVAROXABAN SCH MG: 10 TABLET, FILM COATED ORAL at 14:55

## 2017-09-07 RX ADMIN — ACETAMINOPHEN SCH MG: 500 TABLET, COATED ORAL at 21:31

## 2017-09-07 RX ADMIN — OXYCODONE HYDROCHLORIDE PRN MG: 5 TABLET ORAL at 11:37

## 2017-09-07 NOTE — PROGRESS NOTE
Internal Med Progress Note


Date of Service:


Sep 7, 2017.


Provider Documentation:


Hospitalist Medicine Consultation follow up





SUBJECTIVE:


Patient s/p hip arthroplasty. Seen sitting on chair. Patient denies chest pain 

or shortness of breath or abdominal pain. Has ice pack next to right hip where 

he had surgery. Surgical site with hemovac drainage. 





OBJECTIVE:


General Appearance:  no apparent distress


Head:  normocephalic, atraumatic


Eyes:  normal inspection, sclerae normal, EOMI


ENT:  hearing grossly normal


Neck:  supple, no JVD


Respiratory/Chest:  lungs clear, normal breath sounds, no respiratory distress


Cardiovascular:  regular rate, rhythm, no JVD


Abdomen/GI:  normal bowel sounds, non tender, soft


Extremities/Musculoskelatal:  Has ice pack next to right hip where he had 

surgery. Surgical site with hemovac drainage


Neurologic/Psych:  no motor/sensory deficits, alert, normal mood/affect, 

oriented x 3


Skin:  normal color, warm/dry


ASSESSMENT & PLAN:


S/P RIGHT PIETRO


- POD#1


- activity and wound care orders as per ortho


- pain control with bowel regimen


- PT/OT


- Hemoglobin 8.4 today





RHEUMATOID ARTHRITIS


-can restart methotrexate for treatment of rheumatoid arthritis maintenance 

regimen  upon discharge





BPH


- continue terazosin and finasteride 





DVT PROPHYLAXIS 


- Xarelto as per ortho


Vital Signs:











  Date Time  Temp Pulse Resp B/P (MAP) Pulse Ox O2 Delivery O2 Flow Rate FiO2


 


9/7/17 11:07 36.8 81 16 103/55 (71) 96 Room Air  


 


9/7/17 09:25  91   98   


 


9/7/17 08:35      Room Air  


 


9/7/17 07:13 36.5 67 16 115/66 (82) 96 Room Air  


 


9/7/17 03:00 36.8 65 18 127/60 (82) 95 Room Air  


 


9/6/17 23:28      Room Air  


 


9/6/17 23:18 36.7 70 16 100/54 (69) 96 Room Air  


 


9/6/17 21:05    105/69 (81)    


 


9/6/17 18:54 36.5 82 18 94/54 (67) 96 Room Air  


 


9/6/17 17:53 37.2 84 18 118/70 (86) 98 Nasal Cannula 2.0 


 


9/6/17 16:50 36.5 70 18 111/66 (81) 98 Nasal Cannula 2.0 


 


9/6/17 16:28 36.5 71 18 131/80 (97) 99 Nasal Cannula 2.0 


 


9/6/17 16:15      Nasal Cannula 2.0 


 


9/6/17 15:45     99 Nasal Cannula 2.0 


 


9/6/17 15:45 36.6 68 18 120/70 (87) 99 Nasal Cannula 2.0 


 


9/6/17 15:39 37.5       


 


9/6/17 15:37  66 15  99   


 


9/6/17 15:37  65 15     


 


9/6/17 15:36    126/77    


 


9/6/17 15:32  66 17  99   


 


9/6/17 15:32  66 17     


 


9/6/17 15:31    128/76    


 


9/6/17 15:27  66 20     


 


9/6/17 15:27  66 20  97   


 


9/6/17 15:26    128/76    


 


9/6/17 15:22  66 15     


 


9/6/17 15:22  66 15  97   


 


9/6/17 15:21    121/72    


 


9/6/17 15:17  65 20     


 


9/6/17 15:17  66 20  97   


 


9/6/17 15:16    119/71    


 


9/6/17 15:12  68 14  95   


 


9/6/17 15:12  65 14     


 


9/6/17 15:11    127/73    


 


9/6/17 15:08 37.5       


 


9/6/17 15:07  64 13  97   


 


9/6/17 15:07  65 13     


 


9/6/17 15:06  64 18     


 


9/6/17 15:06  64 18 112/76 97   


 


9/6/17 15:01  64 15     


 


9/6/17 15:01  65 15 120/70 98   


 


9/6/17 15:00  63 16  98   


 


9/6/17 15:00  63 16     


 


9/6/17 14:56    122/74    


 


9/6/17 14:55  63 13  98   


 


9/6/17 14:55  64 13     


 


9/6/17 14:51    107/71    


 


9/6/17 14:50  64 14     


 


9/6/17 14:50  63 14  97   


 


9/6/17 14:49  62 16  98   


 


9/6/17 14:49  62 16     


 


9/6/17 14:46    124/71    


 


9/6/17 14:44  67 23  97   


 


9/6/17 14:44  67 23     


 


9/6/17 14:41    118/67    


 


9/6/17 14:40    116/73    


 


9/6/17 14:39  75 22  92   


 


9/6/17 14:39  70 22     


 


9/6/17 14:39 37.2 71 16 116/73 95 Nasal Cannula 2 








Lab Results:





Results Past 24 Hours








Test


  9/7/17


05:23 Range/Units


 


 


White Blood Count 12.21 4.8-10.8  K/uL


 


Red Blood Count 2.93 4.7-6.1  M/uL


 


Hemoglobin 8.4 14.0-18.0  g/dL


 


Hematocrit 26.4 42-52  %


 


Mean Corpuscular Volume 90.1   fL


 


Mean Corpuscular Hemoglobin 28.7 25-34  pg


 


Mean Corpuscular Hemoglobin


Concent 31.8


  32-36  g/dl


 


 


Platelet Count 326 130-400  K/uL


 


Mean Platelet Volume 9.5 7.4-10.4  fL


 


Neutrophils (%) (Auto) 86.3  %


 


Lymphocytes (%) (Auto) 4.8  %


 


Monocytes (%) (Auto) 8.4  %


 


Eosinophils (%) (Auto) 0.0  %


 


Basophils (%) (Auto) 0.1  %


 


Neutrophils # (Auto) 10.54 1.4-6.5  K/uL


 


Lymphocytes # (Auto) 0.58 1.2-3.4  K/uL


 


Monocytes # (Auto) 1.03 0.11-0.59  K/uL


 


Eosinophils # (Auto) 0.00 0-0.5  K/uL


 


Basophils # (Auto) 0.01 0-0.2  K/uL


 


RDW Standard Deviation 63.0 36.4-46.3  fL


 


RDW Coefficient of Variation 19.1 11.5-14.5  %


 


Immature Granulocyte % (Auto) 0.4  %


 


Immature Granulocyte # (Auto) 0.05 0.00-0.02  K/uL


 


Hypersegmented Polys 1+  


 


Anisocytosis PRESENT  


 


Prothrombin Time


  11.3


  9.0-12.0


SECONDS


 


Prothromb Time International


Ratio 1.1


  0.9-1.1  


 


 


Sodium Level 138 136-145  mmol/L


 


Potassium Level 4.3 3.5-5.1  mmol/L


 


Chloride Level 105   mmol/L


 


Carbon Dioxide Level 26 21-32  mmol/L


 


Anion Gap 7.0 3-11  mmol/L


 


Blood Urea Nitrogen 23 7-18  mg/dl


 


Creatinine


  1.10


  0.60-1.40


mg/dl


 


Est Creatinine Clear Calc


Drug Dose 48.9


   ml/min


 


 


Estimated GFR (


American) 74.7


   


 


 


Estimated GFR (Non-


American 64.4


   


 


 


BUN/Creatinine Ratio 20.5 10-20  


 


Random Glucose 175 70-99  mg/dl


 


Calcium Level 8.7 8.5-10.1  mg/dl

## 2017-09-07 NOTE — ORTHOPEDIC PROGRESS NOTE
Orthopedic Progress Note


Date of Service


Sep 7, 2017.





Subjective


Post OP Day:  1


Reports: feeling well





Objective


N/V intact, dressing C/D/I (Hemovac in place), toes mobile











  Date Time  Temp Pulse Resp B/P (MAP) Pulse Ox O2 Delivery O2 Flow Rate FiO2


 


9/7/17 07:13 36.5 67 16 115/66 (82) 96 Room Air  


 


9/7/17 03:00 36.8 65 18 127/60 (82) 95 Room Air  


 


9/6/17 23:28      Room Air  


 


9/6/17 23:18 36.7 70 16 100/54 (69) 96 Room Air  


 


9/6/17 21:05    105/69 (81)    


 


9/6/17 18:54 36.5 82 18 94/54 (67) 96 Room Air  


 


9/6/17 17:53 37.2 84 18 118/70 (86) 98 Nasal Cannula 2.0 


 


9/6/17 16:50 36.5 70 18 111/66 (81) 98 Nasal Cannula 2.0 


 


9/6/17 16:28 36.5 71 18 131/80 (97) 99 Nasal Cannula 2.0 


 


9/6/17 16:15      Nasal Cannula 2.0 


 


9/6/17 15:45     99 Nasal Cannula 2.0 


 


9/6/17 15:45 36.6 68 18 120/70 (87) 99 Nasal Cannula 2.0 


 


9/6/17 15:39 37.5       


 


9/6/17 15:37  66 15  99   


 


9/6/17 15:37  65 15     


 


9/6/17 15:36    126/77    


 


9/6/17 15:32  66 17  99   


 


9/6/17 15:32  66 17     


 


9/6/17 15:31    128/76    


 


9/6/17 15:27  66 20     


 


9/6/17 15:27  66 20  97   


 


9/6/17 15:26    128/76    


 


9/6/17 15:22  66 15     


 


9/6/17 15:22  66 15  97   


 


9/6/17 15:21    121/72    


 


9/6/17 15:17  65 20     


 


9/6/17 15:17  66 20  97   


 


9/6/17 15:16    119/71    


 


9/6/17 15:12  68 14  95   


 


9/6/17 15:12  65 14     


 


9/6/17 15:11    127/73    


 


9/6/17 15:08 37.5       


 


9/6/17 15:07  64 13  97   


 


9/6/17 15:07  65 13     


 


9/6/17 15:06  64 18     


 


9/6/17 15:06  64 18 112/76 97   


 


9/6/17 15:01  64 15     


 


9/6/17 15:01  65 15 120/70 98   


 


9/6/17 15:00  63 16  98   


 


9/6/17 15:00  63 16     


 


9/6/17 14:56    122/74    


 


9/6/17 14:55  63 13  98   


 


9/6/17 14:55  64 13     


 


9/6/17 14:51    107/71    


 


9/6/17 14:50  64 14     


 


9/6/17 14:50  63 14  97   


 


9/6/17 14:49  62 16  98   


 


9/6/17 14:49  62 16     


 


9/6/17 14:46    124/71    


 


9/6/17 14:44  67 23  97   


 


9/6/17 14:44  67 23     


 


9/6/17 14:41    118/67    


 


9/6/17 14:40    116/73    


 


9/6/17 14:39  75 22  92   


 


9/6/17 14:39  70 22     


 


9/6/17 14:39 37.2 71 16 116/73 95 Nasal Cannula 2 


 


9/6/17 09:51 37.3 88 20 129/85 94 Room Air  








Laboratory Results 24 Hours:











Test


  9/7/17


05:23


 


White Blood Count 12.21 K/uL 


 


Red Blood Count 2.93 M/uL 


 


Hemoglobin 8.4 g/dL 


 


Hematocrit 26.4 % 


 


Mean Corpuscular Volume 90.1 fL 


 


Mean Corpuscular Hemoglobin 28.7 pg 


 


Mean Corpuscular Hemoglobin


Concent 31.8 g/dl 


 


 


Platelet Count 326 K/uL 


 


Mean Platelet Volume 9.5 fL 


 


Neutrophils (%) (Auto) 86.3 % 


 


Lymphocytes (%) (Auto) 4.8 % 


 


Monocytes (%) (Auto) 8.4 % 


 


Eosinophils (%) (Auto) 0.0 % 


 


Basophils (%) (Auto) 0.1 % 


 


Neutrophils # (Auto) 10.54 K/uL 


 


Lymphocytes # (Auto) 0.58 K/uL 


 


Monocytes # (Auto) 1.03 K/uL 


 


Eosinophils # (Auto) 0.00 K/uL 


 


Basophils # (Auto) 0.01 K/uL 


 


Prothromb Time International


Ratio 1.1 


 


 


Prothrombin Time 11.3 SECONDS 











Assessment & Plan


Assessment:


78 yo male stable POD #1 s/p right PIETRO, acute blood loss anemia, VSS


Plan:


1.  Med management


2.  DVT prophylaxis- Xarelgulshan SCDs


3.  PT/OT


4.  D/C planning- home w/ HH

## 2017-09-07 NOTE — ANESTHESIOLOGY PROGRESS NOTE
Anesthesia Post Op Note


Date & Time


Sep 7, 2017 at 09:19





Vital Signs


Pain Intensity:  0.0





Vital Signs Past 12 Hours








  Date Time  Temp Pulse Resp B/P (MAP) Pulse Ox O2 Delivery O2 Flow Rate FiO2


 


9/7/17 07:13 36.5 67 16 115/66 (82) 96 Room Air  


 


9/7/17 03:00 36.8 65 18 127/60 (82) 95 Room Air  


 


9/6/17 23:28      Room Air  


 


9/6/17 23:18 36.7 70 16 100/54 (69) 96 Room Air  











Notes


Mental Status:  alert / awake / arousable, participated in evaluation


Pt Amnestic to Procedure:  Yes


Nausea / Vomiting:  adequately controlled


Pain:  adequately controlled


Airway Patency, RR, SpO2:  stable & adequate


BP & HR:  stable & adequate


Hydration State:  stable & adequate


Neuraxial Anesthesia:  sensory block resolved


Anesthetic Complications:  no major complications apparent

## 2017-09-08 VITALS
OXYGEN SATURATION: 95 % | DIASTOLIC BLOOD PRESSURE: 58 MMHG | HEART RATE: 79 BPM | SYSTOLIC BLOOD PRESSURE: 117 MMHG | TEMPERATURE: 98.06 F

## 2017-09-08 VITALS — OXYGEN SATURATION: 93 %

## 2017-09-08 VITALS
HEART RATE: 110 BPM | SYSTOLIC BLOOD PRESSURE: 111 MMHG | OXYGEN SATURATION: 93 % | DIASTOLIC BLOOD PRESSURE: 64 MMHG | TEMPERATURE: 98.78 F

## 2017-09-08 VITALS
DIASTOLIC BLOOD PRESSURE: 52 MMHG | HEART RATE: 104 BPM | SYSTOLIC BLOOD PRESSURE: 98 MMHG | OXYGEN SATURATION: 96 % | TEMPERATURE: 99.32 F

## 2017-09-08 LAB
EOSINOPHIL NFR BLD AUTO: 272 K/UL (ref 130–400)
HCT VFR BLD CALC: 25.1 % (ref 42–52)
MCH RBC QN AUTO: 29.2 PG (ref 25–34)
MCHC RBC AUTO-ENTMCNC: 32.3 G/DL (ref 32–36)
MCV RBC AUTO: 90.6 FL (ref 80–100)
PMV BLD AUTO: 8.9 FL (ref 7.4–10.4)
RBC # BLD AUTO: 2.77 M/UL (ref 4.7–6.1)
WBC # BLD AUTO: 7.05 K/UL (ref 4.8–10.8)

## 2017-09-08 RX ADMIN — FINASTERIDE SCH MG: 5 TABLET, FILM COATED ORAL at 20:52

## 2017-09-08 RX ADMIN — OXYCODONE HYDROCHLORIDE SCH MG: 10 TABLET, FILM COATED, EXTENDED RELEASE ORAL at 05:16

## 2017-09-08 RX ADMIN — DOCUSATE SODIUM SCH MG: 100 CAPSULE, LIQUID FILLED ORAL at 08:22

## 2017-09-08 RX ADMIN — FERROUS GLUCONATE SCH MG: 324 TABLET ORAL at 18:00

## 2017-09-08 RX ADMIN — ACETAMINOPHEN SCH MG: 500 TABLET, COATED ORAL at 14:31

## 2017-09-08 RX ADMIN — ACETAMINOPHEN SCH MG: 500 TABLET, COATED ORAL at 20:53

## 2017-09-08 RX ADMIN — RIVAROXABAN SCH MG: 10 TABLET, FILM COATED ORAL at 14:31

## 2017-09-08 RX ADMIN — OXYCODONE HYDROCHLORIDE SCH MG: 10 TABLET, FILM COATED, EXTENDED RELEASE ORAL at 18:02

## 2017-09-08 RX ADMIN — FERROUS GLUCONATE SCH MG: 324 TABLET ORAL at 08:22

## 2017-09-08 RX ADMIN — PANTOPRAZOLE SCH MG: 40 TABLET, DELAYED RELEASE ORAL at 08:22

## 2017-09-08 RX ADMIN — Medication SCH TAB: at 08:22

## 2017-09-08 RX ADMIN — OXYCODONE HYDROCHLORIDE PRN MG: 5 TABLET ORAL at 11:55

## 2017-09-08 RX ADMIN — DOCUSATE SODIUM SCH MG: 100 CAPSULE, LIQUID FILLED ORAL at 20:52

## 2017-09-08 RX ADMIN — ACETAMINOPHEN SCH MG: 500 TABLET, COATED ORAL at 05:16

## 2017-09-08 RX ADMIN — FERROUS GLUCONATE SCH MG: 324 TABLET ORAL at 12:32

## 2017-09-08 NOTE — PROGRESS NOTE
Orthopedic SOAP Note


Subjective


Date of Service:


Sep 8, 2017.


Reports: feeling well, pain controlled w PO medications





Objective


N/V intact, hip located, dressing C/D/I











  Date Time  Temp Pulse Resp B/P (MAP) Pulse Ox O2 Delivery O2 Flow Rate FiO2


 


9/8/17 07:34 36.7 79 16 117/58 (77) 95 Room Air  


 


9/7/17 23:30      Room Air  


 


9/7/17 23:01 37.3 85 17 128/69 (88) 95 Room Air  


 


9/7/17 15:52 36.8 98 18 145/68 (93) 95 Room Air  


 


9/7/17 15:20      Room Air  


 


9/7/17 11:07 36.8 81 16 103/55 (71) 96 Room Air  


 


9/7/17 09:25  91   98   


 


9/7/17 08:35      Room Air  











Assessment


76 yo male stable POD #2 s/p right PIETRO, acute blood loss anemia, VSS





Plan


1.  Med management


2.  DVT prophylaxis- Xarelto, SCDs


3.  PT/OT


4.  D/C planning- home w/ HH

## 2017-09-09 VITALS
OXYGEN SATURATION: 97 % | SYSTOLIC BLOOD PRESSURE: 134 MMHG | TEMPERATURE: 99.32 F | HEART RATE: 103 BPM | DIASTOLIC BLOOD PRESSURE: 66 MMHG

## 2017-09-09 VITALS
HEART RATE: 103 BPM | TEMPERATURE: 99.32 F | DIASTOLIC BLOOD PRESSURE: 66 MMHG | OXYGEN SATURATION: 97 % | SYSTOLIC BLOOD PRESSURE: 134 MMHG

## 2017-09-09 LAB
ANION GAP SERPL CALC-SCNC: 7 MMOL/L (ref 3–11)
BUN SERPL-MCNC: 17 MG/DL (ref 7–18)
BUN/CREAT SERPL: 15.2 (ref 10–20)
CALCIUM SERPL-MCNC: 8.9 MG/DL (ref 8.5–10.1)
CHLORIDE SERPL-SCNC: 102 MMOL/L (ref 98–107)
CO2 SERPL-SCNC: 28 MMOL/L (ref 21–32)
CREAT CL PREDICTED SERPL C-G-VRATE: 48.9 ML/MIN
CREAT SERPL-MCNC: 1.1 MG/DL (ref 0.6–1.4)
EOSINOPHIL NFR BLD AUTO: 348 K/UL (ref 130–400)
GLUCOSE SERPL-MCNC: 193 MG/DL (ref 70–99)
HCT VFR BLD CALC: 27.4 % (ref 42–52)
MCH RBC QN AUTO: 28.2 PG (ref 25–34)
MCHC RBC AUTO-ENTMCNC: 31 G/DL (ref 32–36)
MCV RBC AUTO: 91 FL (ref 80–100)
PMV BLD AUTO: 9.2 FL (ref 7.4–10.4)
POTASSIUM SERPL-SCNC: 3.6 MMOL/L (ref 3.5–5.1)
RBC # BLD AUTO: 3.01 M/UL (ref 4.7–6.1)
SODIUM SERPL-SCNC: 137 MMOL/L (ref 136–145)
WBC # BLD AUTO: 8.44 K/UL (ref 4.8–10.8)

## 2017-09-09 RX ADMIN — ACETAMINOPHEN SCH MG: 500 TABLET, COATED ORAL at 05:52

## 2017-09-09 RX ADMIN — ACETAMINOPHEN SCH MG: 500 TABLET, COATED ORAL at 14:50

## 2017-09-09 RX ADMIN — OXYCODONE HYDROCHLORIDE PRN MG: 5 TABLET ORAL at 08:56

## 2017-09-09 RX ADMIN — FERROUS GLUCONATE SCH MG: 324 TABLET ORAL at 08:57

## 2017-09-09 RX ADMIN — OXYCODONE HYDROCHLORIDE SCH MG: 10 TABLET, FILM COATED, EXTENDED RELEASE ORAL at 05:53

## 2017-09-09 RX ADMIN — Medication SCH TAB: at 08:43

## 2017-09-09 RX ADMIN — FERROUS GLUCONATE SCH MG: 324 TABLET ORAL at 13:35

## 2017-09-09 RX ADMIN — DOCUSATE SODIUM SCH MG: 100 CAPSULE, LIQUID FILLED ORAL at 08:43

## 2017-09-09 RX ADMIN — PANTOPRAZOLE SCH MG: 40 TABLET, DELAYED RELEASE ORAL at 08:57

## 2017-09-09 RX ADMIN — RIVAROXABAN SCH MG: 10 TABLET, FILM COATED ORAL at 14:49

## 2017-09-09 NOTE — ORTHOPEDIC PROGRESS NOTE
Orthopedic Progress Note


Date of Service


Sep 9, 2017.





Subjective


Post OP Day:  2


Reports: feeling well, Denies: chest pain, SOB, nausea / vomiting, light 

headedness


Additional Notes:


Awake, alert, sitting in chair.  States that he was a little "loopy" yesterday 

which he feels was from some of his pain medications.  No other complaints.  

Hip "feels fine".





Objective


calves soft nontender, N/V intact, hip located, dressing C/D/I, A&O x3, toes 

mobile











  Date Time  Temp Pulse Resp B/P (MAP) Pulse Ox O2 Delivery O2 Flow Rate FiO2


 


9/9/17 07:15      Room Air  


 


9/9/17 06:55 37.4 103 17 134/66 (88) 97 Room Air  


 


9/9/17 00:15      Room Air  


 


9/8/17 22:57 37.4 104 16 98/52 (67) 96 Room Air  


 


9/8/17 16:30     93 Room Air  


 


9/8/17 15:16 37.1 110 16 111/64 (80) 93 Room Air  








Laboratory Results 24 Hours:











Test


  9/9/17


09:45











Assessment & Plan


Assessment:


78 yo male stable POD #3 s/p right PIETRO, acute blood loss anemia


Mild Tachycardia today, otherwise asymptomatic


Plan:


DC Oxycontin.  Likely the cause of his confusion yesterday


Recheck H/H today


Continue PT/OT


Possible dc to home today with HH services if H/H stable and tolerating current 

pain regimen.








Inhouse Planning


Pain Management:  PO Tylenol, Oxy IR


DVT Prophylaxis:  TEDs, SCDs, Xarelto





Discharge Planning


Discharge Planning:  home with home health

## 2017-09-09 NOTE — DISCHARGE INSTRUCTIONS
Discharge Instructions


Date of Service


Sep 9, 2017.





Admission


Reason for Admission:  Right Hip Degenerative Joint Disease





Discharge


Discharge Diagnosis / Problem:  Right Hip Djd





Discharge Goals


Goal(s):  Decrease discomfort, Improve function





Activity Recommendations


Activity Limitations:  per Instructions/Follow-up section


Weightbearing Status:  Right non-weightbearing





.





Instructions / Follow-Up


Instructions / Follow-Up


ACTIVITY RECOMMENDATIONS:





SELF CARE INSTRUCTIONS AFTER TOTAL HIP REPLACEMENT





Until the incision and soft tissues around your hip have healed, there is


a possibility that the hip prosthesis could dislocate.





A.  Observe the following precautions to prevent dislocation:


   1.  Don't bend your hip greater than 90 degrees.


   2.  Avoid crossing your legs or ankles while standing or lying.


   3.  Sit with your feet placed 6 inches apart.


   4.  When sitting, keep your knees below your hips.  Sit on a firm 


        surface, avoid deep, soft chairs and couches.  Use an elevated


        toilet seat in the bathroom.


   5.  Don't bend over at the waist.  Use a long handled shoehorn and


        a sock aid to help you put on your shoes and socks.  A reacher


        can help you  objects that are too high or too low to reach.


   6.  Keep car riding to a minimum for at least one month after surgery.





B.  Your balance may be shaky for a while.  Use crutches or a walker until 


     directed by your doctor.





C.  Use hand rails when walking on stairs.





D.  Wear low heeled shoes with non-slip soles.





E.  Be sure that your floors are free of things that could trip you - throw rugs

,


     electrical cords, small objects.  Avoid wet and waxed floors, especially 

with


     crutches and canes.





F.  Try to walk several times a day with rest periods between.





G.  Continue with all the exercises taught to you in the hospital.  Again, make 


     walking a part of your daily routine.








SPECIAL CARE INSTRUCTIONS:





**VERY IMPORTANT TO READ AND REVIEW**





A.  You may still be at risk for phlebitis and blood clots.





   1.  Wear surgical stockings (ELVIRA hose) for 2 weeks after surgery to improve


        circulation and reduce swelling.


   2.  Take Xarelto once daily for 4 weeks.  This is your blood thinner


   





B.  You must take antibiotics before having dental work, bladder, bowel and 

other


     surgery.  Your doctor will provide you with a permanent card to carry 

describing 


     precautions.  





C.  Call [facility] if you have a fever, redness or swelling 

around


     the incision, cloudy drainage from incision, or sudden increase in pain in 

your hip, not


     relieved by your regular pain medication.  





D.  Please call the office at [phone] if you have any concerns or 

questions about


     your operation or recovery.





*  YOU MAY SHOWER, NO TUB BATHS UNTIL CLEARED BY YOUR DOCTOR.





*  WEAR ELVIRA HOSE 20 HOURS PER DAY FOR 2 WEEKS.





*  YOU SHOULD USE A WALKER OR CRUTCHES FOR 2-4 WEEKS.  THIS WILL HELP PREVENT


    STRAIN ON YOUR HIP MUSCLE AND ALLOW IT TO HEAL PROPERLY. YOU MAY WEAN TO A


    CANE AS TOLERATED.





*  MOST PATIENTS WILL HAVE HOME NURSING FOR THERAPY.  IF YOU DECIDE TO DO 


   OUTPATIENT PHYSICAL THERAPY, PLEASE SCHEDULE THIS 3 TIMES PER WEEK.





*  Silverlon-  This is a large adhesive bandage that contains silver ions.  

This helps your incision heal by fighting off bacteria and protecting it from 

the outside environment.  You are permitted to shower with this dressing.  This 

will remain on your incision for 7 days and then should be removed.  Some 

visible blood or drainage through the dressing window is normal.  If there is 

significant drainage or leaking noted before the 7 days notify your doctor's 

office immediately.  Once removed, keep incision clean and dry.  If there is 

any drainage or redness noted, please call your surgeon. 


  [phone].   





FOLLOW UP VISIT:





If appointment is not already scheduled:





Please call [facility] to make a follow-up appointment for 


2 weeks after your surgery at [phone].





Current Hospital Diet


Patient's current hospital diet: Regular Diet





Discharge Diet


Recommended Diet:  Regular Diet





Procedures


Procedures Performed:  


Right Total Hip Arthroplasty Uncemented





Pending Studies


Studies pending at discharge:  no





Laboratory Results





Hemoglobin A1c








Test


  7/7/17


15:21 Range/Units


 


 


Estimated Average Glucose 134   mg/dl


 


Hemoglobin A1c 6.3 H 4.5-5.6  %











Medical Emergencies








.


Who to Call and When:





Medical Emergencies:  If at any time you feel your situation is an emergency, 

please call 911 immediately.





.





Non-Emergent Contact


Non-Emergency issues call your:  Surgeon


Call Non-Emergent contact if:  temperature is above 101.5, your pain is not 

controlled, your pain is worsening, wound has increased drainage, wound has 

increased redness


.








"Provider Documentation" section prepared by Mekhi Santos.








.





VTE Core Measure


Inpt VTE Proph given/why not?:  Other Anticoagulation, T.E.DELANO Stockings, SCD's





[place] Drug Monitoring Program


Search Results:  patient reviewed within database, no issues identified

## 2017-09-15 NOTE — DISCHARGE SUMMARY
DISCHARGE DIAGNOSIS:  Degenerative joint disease, right hip.

 

SECONDARY DIAGNOSES:  Gastroesophageal reflux disease, benign prostatic

hypertrophy.

 

CONSULTS:  MANDI Mcgarry/Dr. Gaona.

 

COMPLICATIONS:  None.

 

PROCEDURES:  Right total hip arthroplasty performed by Dr. Cooper on

09/06/2017.

 

BRIEF HISTORY:  As dictated in the history and physical.

 

HOSPITAL SUMMARY:  The patient was admitted on the above noted date and had

the above noted surgery performed which he tolerated well.  On the first

postoperative day, patient was feeling well and had no complaints. 

Neurovascularly intact.  Dressings clean, dry and intact.  Toes were mobile. 

Vital signs were stable and he was afebrile.  Hemoglobin was 8.4 and he was

started on physical therapy protocol and continued on DVT prophylaxis and

pain management.  By his second postoperative day, he was awake and alert,

sitting in his chair; and states that he was a little loopy the day before on

pain medications, but had no other complaints, the hip was feeling fine, and

he was oriented.  Calves were soft and nontender.  Neurovascularly intact. 

Hip was located.  Dressings clean, dry and intact.  Toes were mobile.  Vital

signs were stable and he was afebrile.  He was progressing well with his

physical therapy.  OxyContin had been discontinued because of his somewhat

confusion on the day before.  He was otherwise remaining stable and it was

felt he could be discharged to home on 09/09/2017.  For further review,

please see chart.

 

LAB AND X-RAY DATA:  As per chart.

 

DISCHARGE INSTRUCTIONS:  The patient was discharged to home in satisfactory

condition with home health services on 09/09/2017.

 

DIET:  Regular.

 

ACTIVITY:  Follow [facility] instruction sheets and special care instructions as

noted.  Follow up with Dr. Cooper in 2 weeks.  The patient to call for

appointment if one has not been made for you.

 

DISCHARGE MEDICATIONS:  Rivaroxaban 10 mg p.o. daily x30 days.  Resume home

meds as listed.  The patient to have acetaminophen 500 mg tablet 2 tablets

p.o. q. 8 hours for 30 days, tramadol 50 mg tablet 1-2 tabs p.o. q. 4 hours

p.r.n. pain.  Stop taking methotrexate.

## 2017-09-26 NOTE — CODING QUERY MEDICAL NECESSITY
CQSUPPORTING DIAGNOSIS NEEDED





A supporting diagnosis is required for the test/procedure performed on this patient in 
order for us to be reimbursed by the patient's insurance. Please provide a supporting 
diagnosis for the following test/procedure listed below next to the test name along with 
your signature. 



*If there is no additional diagnosis for this patient that would support the following 
test/procedure please document that below next to the test/procedure.



Test(s)/Procedure(s) that require a supporting diagnosis:



REBECA  07/07/17     GLYCATED HEMOGLOBIN TEST





Provider Signature:  ______________________________  Date:  _______



Thank you  

Laura Harris

Health Information Management

Phone:  [phone]

Fax:  [phone]



Once completed, please kindly fax back to [phone]



For questions please call [phone]

## 2017-11-14 LAB
APPEARANCE UR: CLEAR
BASOPHILS # BLD: 0.02 K/UL (ref 0–0.2)
BASOPHILS NFR BLD: 0.3 %
BILIRUB UR-MCNC: (no result) MG/DL
COLOR UR: YELLOW
COMPLETE: YES
EOSINOPHIL NFR BLD AUTO: 205 K/UL (ref 130–400)
EST. AVERAGE GLUCOSE BLD GHB EST-MCNC: 103 MG/DL
HCT VFR BLD CALC: 33.6 % (ref 42–52)
IG%: 0.4 %
IMM GRANULOCYTES NFR BLD AUTO: 9.7 %
INR PPP: 1 (ref 0.9–1.1)
LYMPHOCYTES # BLD: 0.68 K/UL (ref 1.2–3.4)
MANUAL MICROSCOPIC REQUIRED?: NO
MCH RBC QN AUTO: 26.2 PG (ref 25–34)
MCHC RBC AUTO-ENTMCNC: 29.5 G/DL (ref 32–36)
MCV RBC AUTO: 88.9 FL (ref 80–100)
MONOCYTES NFR BLD: 4 %
NEUTROPHILS # BLD AUTO: 0.7 %
NEUTROPHILS NFR BLD AUTO: 84.9 %
NITRITE UR QL STRIP: (no result)
PARTIAL THROMBOPLASTIN RATIO: 1.3
PH UR STRIP: 5.5 [PH] (ref 4.5–7.5)
PMV BLD AUTO: 9.9 FL (ref 7.4–10.4)
PROTHROMBIN TIME: 10.4 SECONDS (ref 9–12)
RBC # BLD AUTO: 3.78 M/UL (ref 4.7–6.1)
REVIEW REQ?: NO
SP GR UR STRIP: 1.02 (ref 1–1.03)
URINE BILL WITH OR WITHOUT MIC: (no result)
UROBILINOGEN UR-MCNC: (no result) MG/DL
WBC # BLD AUTO: 7.03 K/UL (ref 4.8–10.8)
ZZUR CULT IF INDIC CLEAN CATCH: NO

## 2017-11-14 NOTE — PAT MEDICATION INSTRUCTIONS
Service Date


Nov 14, 2017.





Current Home Medication List


Acetaminophen (Tylenol), 1,000 MG PO prn


Ferrous Sulfate (Kp Ferrous Sulfate), 1 TAB PO BID


Finasteride (Proscar), 5 MG PO HS


Folic Acid (Folic Acid), 1 TAB PO QAM


Leflunomide (Arava), 20 MG PO HS


Multivitamin (Multivitamin), 1 TABLET PO QAM


Omeprazole (Prilosec), 20 MG PO QAM


Prednisone (Prednisone), 5 MG PO QAM


Pseudoephedrine Hcl (Sudafed Nasal Decongestan), 1 TAB PO prn


Terazosin Hcl (Hytrin), 10 MG PO HS





Medication Instructions


For Your Scheduled Surgery 








- Hold the following medications the morning of surgery:


Folic Acid (Folic Acid), 1 TAB PO QAM


Multivitamin (Multivitamin), 1 TABLET PO QAM


Pseudoephedrine Hcl (Sudafed Nasal Decongestan), 1 TAB PO prn


Ferrous Sulfate (Kp Ferrous Sulfate), 1 TAB PO BID








- Take the following medications the morning of surgery with a sip of water 

OTHERWISE NOTHING TO EAT OR DRINK AFTER MIDNIGHT:


Omeprazole (Prilosec), 20 MG PO QAM


Prednisone (Prednisone), 5 MG PO QAM


Acetaminophen (Tylenol), 1,000 MG PO prn (may take if needed up to 4 hours 

prior to surgery)








- Take the following medications as scheduled the night before surgery:


Leflunomide (Arava), 20 MG PO HS


Finasteride (Proscar), 5 MG PO HS


Terazosin Hcl (Hytrin), 10 MG PO HS


Acetaminophen (Tylenol), 1,000 MG PO prn


Pseudoephedrine Hcl (Sudafed Nasal Decongestan), 1 TAB PO prn


Ferrous Sulfate (Kp Ferrous Sulfate), 1 TAB PO BID








If you have any questions please call us at [phone] or [phone] or 

[phone]

## 2017-12-12 NOTE — HISTORY & PHYSICAL EXAMINATION
DATE OF ADMISSION:  12/13/2017

 

CHIEF COMPLAINT:  Chronic left knee pain.

 

HISTORY OF PRESENT ILLNESS:  This is a 77-year-old male patient of Dr. Cooper's complaining of chronic left knee pain, longstanding, now

progressively getting worse.  The patient has failed conservative treatment

including intraarticular injections, anti-inflammatories, narcotics, and the

use of a brace and a cane.  The patient has increased pain with weightbearing

activities and his pain does interfere with his activities of daily living.

 

PAST MEDICAL HISTORY:  Osteoarthritis, rheumatoid arthritis, acid reflux,

BPH.

 

PAST SURGICAL HISTORY:  Left shoulder, right shoulder, hernia, right hip

replacement, right carpal tunnel and vocal cord nodule surgery.

 

REVIEW OF SYSTEMS:  The patient complains of chronic left knee pain,

otherwise denies any shortness of breath, chest pain, nausea, vomiting or any

other joint complaints.

 

FAMILY HISTORY:  Noncontributory.

 

MEDICATIONS:  Include terazosin 10 mg daily, finasteride 5 mg daily, Tylenol

as needed, iron 325 mg daily, Prilosec 10 mg daily, Folic acid 1 mg daily,

leflunomide 20 mg daily, Prednisone 5 mg daily.

 

ALLERGIES:  No known drug allergies.

 

PHYSICAL EXAMINATION:

GENERAL:  Well-developed, well-nourished 77-year-old male in no acute

distress.  He is alert and oriented x3 and pleasant.

HEENT:  Normocephalic, atraumatic.  Extraocular motions are intact.  Pupils

are equal and reactive to light.

HEART:  Regular rate and rhythm.  No murmurs are appreciated.

LUNGS:  Clear.

ABDOMEN:  Soft and nontender, bowel sounds are present.

EXTREMITIES:  Left knee reveals a varus deformity with mild effusion.  He has

medial joint line tenderness.  He has crepitation with passive range of

motion.  He has 5/5 strength.  NEUROLOGIC:  Neurovascularly he is intact in

his left lower extremity.

 

DIAGNOSES:  Left knee end-stage osteoarthritis, rheumatoid arthritis, acid

reflux, benign prostate hypertrophy.

 

PLAN:  The patient was advised of his diagnosis.  Indications, risks,

benefits, and postop course have all been reviewed.  The patient wishes to

proceed with a left total knee arthroplasty.  Necessary consent forms,

preoperative testing and clearances will be obtained.

## 2017-12-13 ENCOUNTER — HOSPITAL ENCOUNTER (INPATIENT)
Dept: HOSPITAL 45 - C.ACU | Age: 77
LOS: 2 days | Discharge: HOME HEALTH | DRG: 470 | End: 2017-12-15
Attending: ORTHOPAEDIC SURGERY | Admitting: ORTHOPAEDIC SURGERY
Payer: COMMERCIAL

## 2017-12-13 VITALS
SYSTOLIC BLOOD PRESSURE: 149 MMHG | TEMPERATURE: 97.7 F | DIASTOLIC BLOOD PRESSURE: 83 MMHG | HEART RATE: 67 BPM | OXYGEN SATURATION: 93 %

## 2017-12-13 VITALS
OXYGEN SATURATION: 95 % | HEART RATE: 75 BPM | SYSTOLIC BLOOD PRESSURE: 152 MMHG | DIASTOLIC BLOOD PRESSURE: 84 MMHG | TEMPERATURE: 97.7 F

## 2017-12-13 VITALS
DIASTOLIC BLOOD PRESSURE: 78 MMHG | SYSTOLIC BLOOD PRESSURE: 138 MMHG | HEART RATE: 70 BPM | TEMPERATURE: 97.88 F | OXYGEN SATURATION: 96 %

## 2017-12-13 VITALS
TEMPERATURE: 97.34 F | SYSTOLIC BLOOD PRESSURE: 134 MMHG | DIASTOLIC BLOOD PRESSURE: 83 MMHG | OXYGEN SATURATION: 96 % | HEART RATE: 71 BPM

## 2017-12-13 VITALS
BODY MASS INDEX: 25.13 KG/M2 | WEIGHT: 150.82 LBS | WEIGHT: 150.82 LBS | HEIGHT: 65 IN | HEIGHT: 65 IN | BODY MASS INDEX: 25.13 KG/M2

## 2017-12-13 VITALS
TEMPERATURE: 97.7 F | HEART RATE: 64 BPM | OXYGEN SATURATION: 94 % | DIASTOLIC BLOOD PRESSURE: 75 MMHG | SYSTOLIC BLOOD PRESSURE: 146 MMHG

## 2017-12-13 VITALS
DIASTOLIC BLOOD PRESSURE: 79 MMHG | TEMPERATURE: 97.88 F | OXYGEN SATURATION: 95 % | HEART RATE: 68 BPM | SYSTOLIC BLOOD PRESSURE: 137 MMHG

## 2017-12-13 VITALS
HEART RATE: 93 BPM | SYSTOLIC BLOOD PRESSURE: 163 MMHG | TEMPERATURE: 98.06 F | DIASTOLIC BLOOD PRESSURE: 94 MMHG | OXYGEN SATURATION: 95 %

## 2017-12-13 VITALS
HEART RATE: 71 BPM | OXYGEN SATURATION: 98 % | SYSTOLIC BLOOD PRESSURE: 164 MMHG | TEMPERATURE: 97.52 F | DIASTOLIC BLOOD PRESSURE: 88 MMHG

## 2017-12-13 VITALS
SYSTOLIC BLOOD PRESSURE: 155 MMHG | DIASTOLIC BLOOD PRESSURE: 87 MMHG | TEMPERATURE: 97.88 F | HEART RATE: 70 BPM | OXYGEN SATURATION: 96 %

## 2017-12-13 DIAGNOSIS — N40.0: ICD-10-CM

## 2017-12-13 DIAGNOSIS — Z87.891: ICD-10-CM

## 2017-12-13 DIAGNOSIS — M06.9: ICD-10-CM

## 2017-12-13 DIAGNOSIS — K21.9: ICD-10-CM

## 2017-12-13 DIAGNOSIS — Z79.899: ICD-10-CM

## 2017-12-13 DIAGNOSIS — D64.9: ICD-10-CM

## 2017-12-13 DIAGNOSIS — Z79.52: ICD-10-CM

## 2017-12-13 DIAGNOSIS — M17.12: Primary | ICD-10-CM

## 2017-12-13 PROCEDURE — 0SRD0J9 REPLACEMENT OF LEFT KNEE JOINT WITH SYNTHETIC SUBSTITUTE, CEMENTED, OPEN APPROACH: ICD-10-PCS | Performed by: ORTHOPAEDIC SURGERY

## 2017-12-13 RX ADMIN — TRAMADOL HYDROCHLORIDE PRN MG: 50 TABLET, COATED ORAL at 19:29

## 2017-12-13 RX ADMIN — Medication SCH MG: at 22:02

## 2017-12-13 RX ADMIN — FINASTERIDE SCH MG: 5 TABLET, FILM COATED ORAL at 22:03

## 2017-12-13 RX ADMIN — TRANEXAMIC ACID SCH MLS/MIN: 100 INJECTION, SOLUTION INTRAVENOUS at 07:03

## 2017-12-13 RX ADMIN — KETOROLAC TROMETHAMINE SCH MG: 15 INJECTION INTRAMUSCULAR; INTRAVENOUS at 22:05

## 2017-12-13 RX ADMIN — DOCUSATE SODIUM SCH MG: 100 CAPSULE, LIQUID FILLED ORAL at 21:00

## 2017-12-13 RX ADMIN — KETOROLAC TROMETHAMINE SCH MG: 15 INJECTION INTRAMUSCULAR; INTRAVENOUS at 13:49

## 2017-12-13 RX ADMIN — TRANEXAMIC ACID SCH MLS/MIN: 100 INJECTION, SOLUTION INTRAVENOUS at 11:23

## 2017-12-13 RX ADMIN — DEXTROSE MONOHYDRATE, SODIUM CHLORIDE, AND POTASSIUM CHLORIDE SCH MLS/HR: 50; 4.5; 1.49 INJECTION, SOLUTION INTRAVENOUS at 13:47

## 2017-12-13 RX ADMIN — RANITIDINE SCH MG: 150 TABLET ORAL at 22:04

## 2017-12-13 RX ADMIN — STANDARDIZED SENNA CONCENTRATE SCH MG: 8.6 TABLET ORAL at 22:04

## 2017-12-13 RX ADMIN — FERROUS GLUCONATE SCH MG: 324 TABLET ORAL at 19:28

## 2017-12-13 RX ADMIN — DEXTROSE MONOHYDRATE, SODIUM CHLORIDE, AND POTASSIUM CHLORIDE SCH MLS/HR: 50; 4.5; 1.49 INJECTION, SOLUTION INTRAVENOUS at 23:35

## 2017-12-13 RX ADMIN — TRAMADOL HYDROCHLORIDE PRN MG: 50 TABLET, COATED ORAL at 13:08

## 2017-12-13 RX ADMIN — CEFAZOLIN SCH MLS/HR: 10 INJECTION, POWDER, FOR SOLUTION INTRAVENOUS at 16:30

## 2017-12-13 RX ADMIN — CEFAZOLIN SCH MLS/HR: 10 INJECTION, POWDER, FOR SOLUTION INTRAVENOUS at 23:35

## 2017-12-13 NOTE — ANESTHESIOLOGY PROGRESS NOTE
Anesthesia Post Op Note


Date & Time


Dec 13, 2017 at 11:27





Vital Signs


Pain Intensity:  0





Vital Signs Past 12 Hours








  Date Time  Temp Pulse Resp B/P (MAP) Pulse Ox O2 Delivery O2 Flow Rate FiO2


 


12/13/17 11:15 36.4 63 12 144/80 100 Nasal Cannula 2 


 


12/13/17 11:00  66 12 140/84 99 Nasal Cannula 2 


 


12/13/17 10:50  69 14 145/79 97 Nasal Cannula 2 


 


12/13/17 10:40  73 12 138/81 99 Nasal Cannula 2 


 


12/13/17 10:30 36.0 80 15 137/78 98 Nasal Cannula 2 


 


12/13/17 06:45 36.7 93 20 163/94 95 Room Air  











Notes


Mental Status:  alert / awake / arousable, participated in evaluation


Pt Amnestic to Procedure:  Yes


Nausea / Vomiting:  adequately controlled


Pain:  adequately controlled


Airway Patency, RR, SpO2:  stable & adequate


BP & HR:  stable & adequate


Hydration State:  stable & adequate


Anesthetic Complications:  no major complications apparent

## 2017-12-13 NOTE — DIAGNOSTIC IMAGING REPORT
LEFT KNEE 2 VIEWS



History: Left total knee arthroplasty. Degenerative arthritis. Postop.



FINDINGS: The patient is status post a left total knee arthroplasty. The

hardware is intact. No fracture or dislocation. Skin staples and surgical drains

are in place.



IMPRESSION:  

Left total knee arthroplasty. No evidence for hardware complication.







Electronically signed by:  Beny Ren M.D.

12/13/2017 10:59 AM



Dictated Date/Time:  12/13/2017 10:59 AM

## 2017-12-13 NOTE — [facility] POST OPERATIVE BRIEF NOTE
Immediate Operative Summary


Operative Date


Dec 13, 2017.





Pre-Operative Diagnosis





Left Knee End-Stage Osteoarthritis





Post-Operative Diagnosis





Same





Procedure(s) Performed





Left Total Knee Arthroplasty





Surgeon


Dr. Flo Cooper





Assistant Surgeon(s)


Mekhi Santos PA-C





Estimated Blood Loss


10 ML





Findings


tricompartmental djd oa varus grade 4 medial





Specimens





A. Left Knee Bone and Tissue





Drains


2 hemovac





Anesthesia


spinal sedation adductor block and orthomix





Complication(s)


None





Disposition


Recovery Room / PACU

## 2017-12-13 NOTE — OPERATIVE REPORT
DATE OF OPERATION:  12/13/2017

 

INDICATION FOR PROCEDURE:  The patient is a 77-year-old male who presents

with chronic progressive osteoarthritis in his left knee.  He has end-stage

osteoarthritis and he has failed all conservative management including

injections.  He radiographically has bone-on-bone in the medial compartment

with a varus knee.  He has a tight varus knee with a varus thrust when he

walks with some dressing out of his MCL.  He does have a little pseudolaxity

on the medial side on exam as well.  He does have tricompartmental DJD with

bone-on-bone to medial compartment with some minor bone loss.

 

PREOPERATIVE DIAGNOSIS:  End-stage osteoarthritis, left knee.

 

POSTOPERATIVE DIAGNOSIS:  Same.

 

PROCEDURE:  Left total knee arthroplasty.

 

SURGEON:  Dr. Cooper.

 

ASSISTANT:  ANITHA Mithcell.

 

ANESTHESIA:  Spinal, adductor nerve block and Orthomix.

 

OPERATIVE PROCEDURE:  The patient was taken to the operating room and

anesthetized under anesthesia as dictated.  He was placed supine on the

operating room table.  Pneumatic tourniquet was placed into his left upper

thigh.  Left lower extremity was prepped and draped in sterile fashion.  Exam

was as per dictation.  He had good range of motion.  After his leg was

prepped and draped, elevated and exsanguinated, the tourniquet was raised to

300 mmHg.  An anterior incision was made across the left knee.  Skin was

incised sharply and subcutaneous flaps were elevated.  He had some thickened

prepatellar bursitis that was resected.  The incision was made through medial

retinaculum and was extended up in the mid third of the quadriceps tendon and

extended down to the medial tibial tubercle.  Intraarticular findings

demonstrated a varus knee, grade 4 DJD, eburnated bone on medial sided. 

Tricompartmental areas of grade 4 DJD on the lateral femoral condyle, small

areas of wear there down to grade 4, as well as the patella had area of grade

4 DJD on this well.  There were tricompartmental osteophytes.  The [employer]

[employer] Journey 2.0 total knee arthroplasty system was used using mojio

MRI templating.  Used template for a 6 femur, 5 tibia.  The knee was exposed

by excising the infrapatellar fat pad, excising the fat pad over the anterior

femur just below the joint line for placement of the component in that area. 

The lateral synovial bands were released.  The cruciate ligaments were

resected.  The meniscal remnants were resected.  We had to do some releases

around the medial and posteromedial knee because he had a tight medial

compartment.  The femur was exposed.  The custom femoral cutting block was

pinned in position and the distal femoral cut was made.  The 5-1 cutting

block was placed at the 6 femur component.  Anterior and posterior chamfer

cuts were made.  The knee was then extended.  A subperiosteal dissection was

performed around the lateral patella and then the patella width was measured

and width was reproduced using a 35 mm patellar component and drill holes

were made for the patellar component.  We used a free hand cut technique for

the cut.  The excess lateral facet was beveled off to prevent any

impingement.  Then the tibia was subluxed and the custom tibial cutting block

was pinned in position and the proximal tibial cut was made.  The size 5

tibial trial was externally rotated in line with the tibial tubercle, pinned

in position and the punch for the stem was used.  The 6 femoral trial was

inserted, centered and the notch cutting devices were used.  Collet was

placed.  We did a trial reduction and still too tight medially compared to

his laterally because the lateral collateral ligaments were stretched out

from his varus thrust.  So, we went ahead and used the lamina  pie

crusted the MCL, so just to get the ligaments in appropriate balance and then

went ahead and did a 50 mm trial which gave him full extension, flexion and

no midflexion instability and had full range of motion and stability.  The

patella tracked centrally.  The trials were removed and then the anesthetic

cocktail was injected per protocol.  The knee was copiously irrigated with

pulsatile lavage antibiotic solution with bacitracin.  The bony surfaces were

dried.  The final components were cemented with Simplex G cement.  Final

components were a 6 Oxinium posterior stabilized [employer] Journey 2.0

femur for the left knee the 5 tibial baseplate.  The 15 high flex poly insert

and 35 patella.  While the cement cured, the Betadine soaked was used per

protocol.  Then after cement cured, the knee was copiously irrigated with

pulsatile lavage antibiotic solution and bacitracin.  Then, the quadriceps

tendon and medial retinaculum were closed with interrupted figure-of-eight #1

Vicryl sutures.  Subcutaneous tissues closed with interrupted 2-0 Vicryl

sutures.  The skin was then closed with staples, sterile dressings were

applied and the patient tolerated the procedure well.  ANITHA Mitchell was

my first assist and he functioned as first assistant through the entire

procedure.  He assisted in leg positioning, soft tissue retraction,

instrument management, and he performed the fascial, subcutaneous and skin

closure and will participate in postoperative care of the patient.

 

 

I attest to the content of the Intraoperative Record and any orders documented therein. Any exception
s are noted below.

## 2017-12-13 NOTE — MEDICAL CONSULT
Consultation


Date of Consultation:


Dec 13, 2017.


Attending Physician:


Flo Cooper M.D.


Reason for Consultation:


Post Op Medical Management


History of Present Illness


77 year old male who is s/p left knee replacement today by Dr. Cooper. Post op 

the patient is doing well. He reports his pain is well controlled. He reports 

some mild residual numbness to the BLLE. He denies chest pain and shortness of 

breath. No abdominal pain, nausea, or vomiting. He denies lightheadedness and 

dizziness.





Past Medical/Surgical History


Medical Problems:


(1) BPH (benign prostatic hyperplasia)


Status: Chronic  





(2) Chronic anemia


Status: Chronic  





(3) Renal cyst


Status: Chronic  





(4) Rheumatoid arthritis


Status: Chronic  





Surgical Problems:


(1) H/O inguinal hernia repair


Status: Chronic  





(2) History of left knee replacement


Status: Chronic  





(3) History of right hip replacement


Status: Chronic  





(4) Hx of nasal septoplasty


Status: Chronic  





(5) S/P carpal tunnel release


Status: Chronic  





(6) S/P rotator cuff repair


Status: Chronic  








Family History





FH: CAD (coronary artery disease)


  BROTHER





Social History


Smoking Status:  Former Smoker


Alcohol Use:  occasionally





Allergies


Coded Allergies:  


     Lisinopril (Verified  Adverse Reaction, Mild, COUGH, 12/13/17)





Home Medications


Colace (Docusate Sodium) 100 Mg Cap 1 Cap PO HS 15 Days


Sudafed Nasal Decongestan (Pseudoephedrine Hcl) 30 Mg Tab 1 Tab PO PRN 10 Days


Tylenol (Acetaminophen) 500 Mg Tab 1,000 Mg PO PRN


Prednisone 5 Mg Tab 5 Mg PO QAM


Arava (Leflunomide) 20 Mg Tab 20 Mg PO HS


Kp Ferrous Sulfate (Ferrous Sulfate) 325 Mg Tab 1 Tab PO BID 30 Days


Folic Acid 1 Mg Tab 1 Tab PO QAM


Prilosec (Omeprazole) 20 Mg Capcr 20 Mg PO QAM


Proscar (Finasteride) 5 Mg Tab 5 Mg PO HS


Multivitamin (Multivitamins)  Tab 1 Tablet PO QAM


Hytrin (Terazosin Hcl) 10 Mg Cap 10 Mg PO HS





Current Inpatient Medications





Current Inpatient Medications








 Medications


  (Trade)  Dose


 Ordered  Sig/Juaquin


 Route  Start Time


 Stop Time Status Last Admin


Dose Admin


 


 Lactated Ringer's  1,000 ml @ 


 60 mls/hr  [id number]


 IV  12/13/17 06:00


 12/13/17 22:39   


 


 


 Cefazolin Sodium  5 ml @ 


 1.667 mls/


 min  PREOP


 IV  12/13/17 06:00


 12/13/17 18:00  12/13/17 08:37


1.667 MLS/MIN


 


 Acetaminophen


  (Tylenol Tab)  1,000 mg  PREOP


 PO  12/13/17 06:00


 12/13/17 18:00  12/13/17 07:02


1,000 MG


 


 Celecoxib


  (CeleBREX CAP)  200 mg  PREOP


 PO  12/13/17 06:00


 12/13/17 18:00  12/13/17 07:02


200 MG


 


 Dexamethasone


  (Decadron Tab)  8 mg  PREOP


 PO  12/13/17 06:00


 12/13/17 18:00  12/13/17 07:02


8 MG


 


 Famotidine


  (Pepcid Tab)  20 mg  PREOP


 PO  12/13/17 06:00


 12/13/17 18:00  12/13/17 07:02


20 MG


 


 Gabapentin


  (Neurontin Cap)  300 mg  PREOP


 PO  12/13/17 06:00


 12/13/17 18:00  12/13/17 07:01


300 MG


 


 Metoclopramide HCl


  (Reglan Tab)  10 mg  PREOP


 PO  12/13/17 06:00


 12/13/17 18:00  12/13/17 07:02


10 MG


 


 Lactated Ringer's  1,000 ml @ 


 15 mls/hr  Q24H


 IV  12/13/17 06:00


 12/14/17 05:59   


 


 


 Fentanyl Citrate


  (Fentanyl Inj)  50 mcg  Q5M  PRN


 IV  12/13/17 08:15


 12/13/17 13:15   


 


 


 Ondansetron HCl


  (Zofran Inj)  4 mg  ONE  PRN


 IV  12/13/17 08:15


 12/13/17 13:15   


 


 


 Ephedrine Sulfate


  (EpHEDrine


 SULFATE INJ)  5 mg  Q5M  PRN


 IV  12/13/17 08:15


 12/13/17 13:15   


 


 


 Atropine Sulfate


  (Atropine


 Sulfate 0.1MG/Ml


 Inj)  0.5 mg  Q1M  PRN


 IV  12/13/17 08:15


 12/13/17 13:15   


 


 


 Finasteride


  (Proscar Tab)  5 mg  HS


 PO  12/13/17 21:00


 1/12/18 20:59   


 


 


 Prednisone


  (PredniSONE TAB)  5 mg  QAM


 PO  12/14/17 09:00


 1/13/18 08:59   


 


 


 Terazosin HCl


  (Hytrin Cap)  10 mg  HS


 PO  12/13/17 21:00


 1/12/18 20:59   


 


 


 Potassium


 Chloride/Dextrose/


 Sod Cl  1,000 ml @ 


 100 mls/hr  Q10H


 IV  12/13/17 13:15


 12/14/17 10:31   


 


 


 Cefazolin Sodium


 1000 mg/Syringe  5 ml @ 100


 mls/hr  Q8H


 IV  12/13/17 16:00


 12/14/17 00:02   


 


 


 Ketorolac


 Tromethamine


  (Toradol Inj)  15 mg  Q6H


 IV.  12/13/17 14:00


 12/14/17 08:01   


 


 


 Acetaminophen/


 Hydrocodone Bitart


  (Norco 5/325 Tab)  1 TABLET


 FOR PAIN


 RATING...  Q4H  PRN


 PO  12/13/17 10:45


 12/27/17 10:44   


 


 


 Magnesium


 Hydroxide


  (Milk Of


 Magnesia Susp)  30 ml  Q6H  PRN


 PO  12/13/17 10:45


 1/12/18 10:44   


 


 


 Senna


  (Senokot Tab)  17.2 mg  HS


 PO  12/13/17 21:00


 1/12/18 20:59   


 


 


 Docusate Sodium


  (coLACE CAP)  100 mg  BID


 PO  12/13/17 21:00


 1/12/18 20:59   


 


 


 Al Hydrox/Mg


 Hydrox/Simethicone


  (Maalox Max Susp)  15 ml  Q4H  PRN


 PO  12/13/17 10:45


 1/12/18 10:44   


 


 


 Multivitamins


  (Multivitamin


 Tab)  1 tab  QAM


 PO  12/14/17 09:00


 1/13/18 08:59   


 


 


 Ondansetron HCl


  (Zofran Inj)  4 mg  Q6H  PRN


 IV  12/13/17 10:45


 1/12/18 10:44   


 


 


 Ferrous Gluconate


  (Ferrous


 Gluconate Tab)  324 mg  TIDM


 PO  12/13/17 17:45


 1/12/18 17:44   


 


 


 Tramadol HCl


  (Ultram Tab)  1 tablet


 for pain


 rating...  Q4H  PRN


 PO  12/13/17 10:45


 1/12/18 10:44   


 


 


 Aspirin


  (Ecotrin Tab)  81 mg  BID


 PO  12/13/17 21:00


 1/12/18 20:59   


 


 


 Ranitidine HCl


  (zANTac TAB)  150 mg  BID


 PO  12/13/17 21:00


 1/12/18 20:59   


 











Review of Systems


ROS per HPI, all other systems reviewed and negative





Physical Exam











  Date Time  Temp Pulse Resp B/P (MAP) Pulse Ox O2 Delivery O2 Flow Rate FiO2


 


12/13/17 12:35 36.3 71 18 134/83 (100) 96 Room Air  


 


12/13/17 11:30      Nasal Cannula 2.0 


 


12/13/17 11:30 36.6 70 16 138/78 (98) 96 Nasal Cannula 2.0 


 


12/13/17 11:30      Nasal Cannula  


 


12/13/17 11:15 36.4 63 12 144/80 100 Nasal Cannula 2 


 


12/13/17 11:00  66 12 140/84 99 Nasal Cannula 2 


 


12/13/17 10:50  69 14 145/79 97 Nasal Cannula 2 


 


12/13/17 10:40  73 12 138/81 99 Nasal Cannula 2 


 


12/13/17 10:30 36.0 80 15 137/78 98 Nasal Cannula 2 


 


12/13/17 06:45 36.7 93 20 163/94 95 Room Air  








General Appearance:  WD/WN, no apparent distress


Head:  normocephalic, atraumatic


Eyes:  normal inspection, EOMI, sclerae normal


ENT:  hearing grossly normal, + pertinent finding (mucous membranes moist)


Neck:  supple, no JVD, trachea midline


Respiratory/Chest:  lungs clear, normal breath sounds, no respiratory distress


Cardiovascular:  regular rate, rhythm, no edema, normal peripheral pulses


Abdomen/GI:  normal bowel sounds, non tender, soft, no organomegaly


Extremities/Musculoskelatal:  + pertinent finding (s/p left knee surgery, 

surgical dressing intact, drain in place draining bloody drainage, CSM checks 

intact to LLE)


Neurologic/Psych:  no motor/sensory deficits, alert, normal mood/affect, 

oriented x 3


Skin:  normal color, warm/dry





Assessment & Plan


S/P LEFT TKA


- POD#0


- activity and wound care orders as per ortho


- pain control with bowel regimen


- PT/OT


- monitor H/H for acute blood loss anemia and transfuse blood products PRN





RHEUMATOID ARTHRITIS


- continue chronic prednisone


- leflunomide on hold due to recent surgery





BPH


- continue terazosin 





CHRONIC ANEMIA


- monitor H/H


- continue iron replacement 





DVT PROPHYLAXIS


- ASA 81mg BID per ortho 








Thank you for this consultation.


We will follow the patient with you during their hospital stay.


You can reach a [facility] via pager @ [phone].





ADDENDUM:





This is a 77 year old male with a PMH of Rheumatoid Arthritis on prednisone and 

leflunomide, BPH, chronic anemia - presents for a scheduled L TKA.


Doing well post-operatively.


Pain controlled


drain is in place, draining blood


we will monitor labs


start prednisone, hold leflunomide for about a week


DVT ppx - as per ortho

## 2017-12-14 VITALS
DIASTOLIC BLOOD PRESSURE: 91 MMHG | OXYGEN SATURATION: 93 % | SYSTOLIC BLOOD PRESSURE: 150 MMHG | TEMPERATURE: 97.88 F | HEART RATE: 83 BPM

## 2017-12-14 VITALS
HEART RATE: 69 BPM | DIASTOLIC BLOOD PRESSURE: 76 MMHG | SYSTOLIC BLOOD PRESSURE: 143 MMHG | TEMPERATURE: 97.88 F | OXYGEN SATURATION: 95 %

## 2017-12-14 VITALS
SYSTOLIC BLOOD PRESSURE: 139 MMHG | OXYGEN SATURATION: 97 % | HEART RATE: 66 BPM | DIASTOLIC BLOOD PRESSURE: 74 MMHG | TEMPERATURE: 98.06 F

## 2017-12-14 VITALS
DIASTOLIC BLOOD PRESSURE: 86 MMHG | HEART RATE: 76 BPM | TEMPERATURE: 98.06 F | OXYGEN SATURATION: 95 % | SYSTOLIC BLOOD PRESSURE: 151 MMHG

## 2017-12-14 VITALS
OXYGEN SATURATION: 93 % | SYSTOLIC BLOOD PRESSURE: 134 MMHG | HEART RATE: 68 BPM | DIASTOLIC BLOOD PRESSURE: 76 MMHG | TEMPERATURE: 98.06 F

## 2017-12-14 VITALS — OXYGEN SATURATION: 95 %

## 2017-12-14 VITALS
TEMPERATURE: 98.06 F | DIASTOLIC BLOOD PRESSURE: 72 MMHG | SYSTOLIC BLOOD PRESSURE: 156 MMHG | OXYGEN SATURATION: 95 % | HEART RATE: 81 BPM

## 2017-12-14 LAB
ANION GAP SERPL CALC-SCNC: 4 MMOL/L (ref 3–11)
BUN SERPL-MCNC: 24 MG/DL (ref 7–18)
BUN/CREAT SERPL: 22.1 (ref 10–20)
CALCIUM SERPL-MCNC: 8.5 MG/DL (ref 8.5–10.1)
CHLORIDE SERPL-SCNC: 103 MMOL/L (ref 98–107)
CO2 SERPL-SCNC: 29 MMOL/L (ref 21–32)
CREAT CL PREDICTED SERPL C-G-VRATE: 50.3 ML/MIN
CREAT SERPL-MCNC: 1.07 MG/DL (ref 0.6–1.4)
EOSINOPHIL NFR BLD AUTO: 219 K/UL (ref 130–400)
GLUCOSE SERPL-MCNC: 161 MG/DL (ref 70–99)
HCT VFR BLD CALC: 30.1 % (ref 42–52)
MCH RBC QN AUTO: 25.7 PG (ref 25–34)
MCHC RBC AUTO-ENTMCNC: 30.6 G/DL (ref 32–36)
MCV RBC AUTO: 84.1 FL (ref 80–100)
PMV BLD AUTO: 10 FL (ref 7.4–10.4)
POTASSIUM SERPL-SCNC: 4.2 MMOL/L (ref 3.5–5.1)
RBC # BLD AUTO: 3.58 M/UL (ref 4.7–6.1)
SODIUM SERPL-SCNC: 136 MMOL/L (ref 136–145)
WBC # BLD AUTO: 13.28 K/UL (ref 4.8–10.8)

## 2017-12-14 RX ADMIN — RANITIDINE SCH MG: 150 TABLET ORAL at 20:44

## 2017-12-14 RX ADMIN — DEXTROSE MONOHYDRATE, SODIUM CHLORIDE, AND POTASSIUM CHLORIDE SCH MLS/HR: 50; 4.5; 1.49 INJECTION, SOLUTION INTRAVENOUS at 09:03

## 2017-12-14 RX ADMIN — FERROUS GLUCONATE SCH MG: 324 TABLET ORAL at 08:10

## 2017-12-14 RX ADMIN — FINASTERIDE SCH MG: 5 TABLET, FILM COATED ORAL at 20:45

## 2017-12-14 RX ADMIN — DOCUSATE SODIUM SCH MG: 100 CAPSULE, LIQUID FILLED ORAL at 20:44

## 2017-12-14 RX ADMIN — Medication SCH MG: at 20:44

## 2017-12-14 RX ADMIN — RANITIDINE SCH MG: 150 TABLET ORAL at 08:10

## 2017-12-14 RX ADMIN — FERROUS GLUCONATE SCH MG: 324 TABLET ORAL at 19:04

## 2017-12-14 RX ADMIN — FERROUS GLUCONATE SCH MG: 324 TABLET ORAL at 12:43

## 2017-12-14 RX ADMIN — KETOROLAC TROMETHAMINE SCH MG: 15 INJECTION INTRAMUSCULAR; INTRAVENOUS at 01:36

## 2017-12-14 RX ADMIN — TRAMADOL HYDROCHLORIDE PRN MG: 50 TABLET, COATED ORAL at 12:43

## 2017-12-14 RX ADMIN — Medication SCH MG: at 08:10

## 2017-12-14 RX ADMIN — DOCUSATE SODIUM SCH MG: 100 CAPSULE, LIQUID FILLED ORAL at 08:10

## 2017-12-14 RX ADMIN — Medication SCH TAB: at 08:11

## 2017-12-14 RX ADMIN — HYDROCODONE BITARTRATE AND ACETAMINOPHEN PRN TAB: 5; 325 TABLET ORAL at 20:10

## 2017-12-14 RX ADMIN — KETOROLAC TROMETHAMINE SCH MG: 15 INJECTION INTRAMUSCULAR; INTRAVENOUS at 08:09

## 2017-12-14 RX ADMIN — STANDARDIZED SENNA CONCENTRATE SCH MG: 8.6 TABLET ORAL at 20:45

## 2017-12-14 RX ADMIN — TRAMADOL HYDROCHLORIDE PRN MG: 50 TABLET, COATED ORAL at 17:06

## 2017-12-14 NOTE — PROGRESS NOTE
Internal Med Progress Note


Date of Service:


Dec 14, 2017.


Provider Documentation:





SUBJECTIVE:





no complain of SOB or chest pain 


no fever or chills 


minimum pain on left  knee 











OBJECTIVE:





Vital Signs-as noted below





Exam:


General-no sign of distress 


Eyes-sclera non icteric , PERRLA/EOMI 


ENT-moist oral mucosa , normal oropharynx 


Neck-trachea midline , no thyromegaly 


Lungs-clear to auscultate , no wheeze or rales 


Heart-regular S1/S2 


Abdomen-soft, non tender 


Extremities-s/p left knee surgery 


Neuro-AAO x3, no focal neurological deficit 





Lab data as noted below.


ASSESSMENT & PLAN:


S/P LEFT TKA


- POD# 1 


-recovering well post op 


cont PT/OT 


further post op management as per Ortho 








RHEUMATOID ARTHRITIS


- continue chronic prednisone


- leflunomide on hold due to recent surgery


-can be resumed one week post op 





BPH


- continue terazosin 





CHRONIC ANEMIA


- Hb at approx baseline 


- continue iron replacement 





DVT PROPHYLAXIS


- ASA 81mg BID per ortho 








DISPOSITION


per Primary team 


medically stable


Vital Signs:











  Date Time  Temp Pulse Resp B/P (MAP) Pulse Ox O2 Delivery O2 Flow Rate FiO2


 


12/15/17 08:21 36.8 89 16  95 Room Air  


 


12/15/17 08:13 36.8 89 16 148/76 (100) 95 Room Air  


 


12/15/17 08:00     95 Room Air  


 


12/15/17 06:48 36.7 95 18 176/96 (122) 93 Room Air  


 


12/14/17 23:30      Room Air  


 


12/14/17 23:13 36.6 83 16 150/91 (110) 93 Room Air  


 


12/14/17 20:02     95 Room Air  


 


12/14/17 19:50 36.7 76 20 151/86 (107) 95 Room Air  


 


12/14/17 15:58 36.7 68 20 134/76 (95) 93 Room Air  


 


12/14/17 15:15      Room Air  


 


12/14/17 11:37 36.6 69 18 143/76 (98) 95 Room Air  








Lab Results:

## 2017-12-14 NOTE — ORTHOPEDIC PROGRESS NOTE
Orthopedic Progress Note


Date of Service


Dec 14, 2017.





Subjective


Post OP Day:  1


Reports: feeling well, pain controlled w PO medications, Denies: complaints, 

chest pain, SOB, nausea / vomiting, light headedness, calf pain





Objective


calves soft nontender, N/V intact, capillary refill less than 2 sec., dressing C

/D/I, A&O x3, toes mobile











  Date Time  Temp Pulse Resp B/P (MAP) Pulse Ox O2 Delivery O2 Flow Rate FiO2


 


12/14/17 07:00 36.7 81 18 156/72 (100) 95 Room Air  


 


12/14/17 03:19 36.7 66 14 139/74 (95) 97 Room Air  


 


12/13/17 23:53 36.5 64 16 146/75 (98) 94 Room Air  


 


12/13/17 23:46      Room Air  


 


12/13/17 18:44 36.6 70 18 155/87 (109) 96 Room Air  


 


12/13/17 15:50      Room Air  


 


12/13/17 14:50 36.5 67 18 149/83 (105) 93 Room Air  


 


12/13/17 14:24 36.6 68 20 137/79 (98) 95 Room Air  


 


12/13/17 13:34 36.5 75 19 152/84 (106) 95 Room Air  


 


12/13/17 12:35 36.3 71 18 134/83 (100) 96 Room Air  


 


12/13/17 12:00 36.4 71 19 164/88 (113) 98 Nasal Cannula 2.0 


 


12/13/17 11:30      Nasal Cannula 2.0 


 


12/13/17 11:30 36.6 70 16 138/78 (98) 96 Nasal Cannula 2.0 


 


12/13/17 11:30      Nasal Cannula  


 


12/13/17 11:15 36.4 63 12 144/80 100 Nasal Cannula 2 


 


12/13/17 11:00  66 12 140/84 99 Nasal Cannula 2 


 


12/13/17 10:50  69 14 145/79 97 Nasal Cannula 2 


 


12/13/17 10:40  73 12 138/81 99 Nasal Cannula 2 


 


12/13/17 10:30 36.0 80 15 137/78 98 Nasal Cannula 2 








Laboratory Results 24 Hours:











Test


  12/14/17


05:30


 


Hematocrit 30.1 % 


 


Hemoglobin 9.2 g/dL 











Assessment & Plan


Assessment:


POD #1, Left TKA


Plan:


pt/ ot


DVT Proph- ASA


D/C planning- Home w HH


As per medicine.








Inhouse Planning


Pain Management:  Ultram, Norco


DVT Prophylaxis:  TEDs, SCDs, ASA





Discharge Planning


Discharge Planning:  home with home health


Pain Management:  Norco


DVT Prophylaxis:  TEDs, ASA


Therapy:  Physical Therapy, Occupational Therapy

## 2017-12-15 VITALS
SYSTOLIC BLOOD PRESSURE: 176 MMHG | DIASTOLIC BLOOD PRESSURE: 96 MMHG | TEMPERATURE: 98.06 F | HEART RATE: 95 BPM | OXYGEN SATURATION: 93 %

## 2017-12-15 VITALS
OXYGEN SATURATION: 95 % | SYSTOLIC BLOOD PRESSURE: 148 MMHG | HEART RATE: 89 BPM | DIASTOLIC BLOOD PRESSURE: 76 MMHG | TEMPERATURE: 98.24 F

## 2017-12-15 VITALS
SYSTOLIC BLOOD PRESSURE: 148 MMHG | HEART RATE: 89 BPM | OXYGEN SATURATION: 95 % | TEMPERATURE: 98.24 F | DIASTOLIC BLOOD PRESSURE: 76 MMHG

## 2017-12-15 VITALS — OXYGEN SATURATION: 95 %

## 2017-12-15 RX ADMIN — TRAMADOL HYDROCHLORIDE PRN MG: 50 TABLET, COATED ORAL at 06:42

## 2017-12-15 RX ADMIN — DOCUSATE SODIUM SCH MG: 100 CAPSULE, LIQUID FILLED ORAL at 09:32

## 2017-12-15 RX ADMIN — Medication SCH TAB: at 09:32

## 2017-12-15 RX ADMIN — FERROUS GLUCONATE SCH MG: 324 TABLET ORAL at 12:20

## 2017-12-15 RX ADMIN — RANITIDINE SCH MG: 150 TABLET ORAL at 09:32

## 2017-12-15 RX ADMIN — FERROUS GLUCONATE SCH MG: 324 TABLET ORAL at 07:57

## 2017-12-15 RX ADMIN — Medication SCH MG: at 09:32

## 2017-12-15 RX ADMIN — HYDROCODONE BITARTRATE AND ACETAMINOPHEN PRN TAB: 5; 325 TABLET ORAL at 07:56

## 2017-12-15 NOTE — ORTHOPEDIC PROGRESS NOTE
Orthopedic Progress Note


Date of Service


Dec 15, 2017.





Subjective


Post OP Day:  2


Reports: feeling well, pain controlled w PO medications, Denies: complaints, 

chest pain, SOB, nausea / vomiting, light headedness, calf pain





Objective


calves soft nontender, N/V intact, capillary refill less than 2 sec., dressing C

/D/I, A&O x3, toes mobile


SILVERLON IN TACT











  Date Time  Temp Pulse Resp B/P (MAP) Pulse Ox O2 Delivery O2 Flow Rate FiO2


 


12/15/17 08:21 36.8 89 16  95 Room Air  


 


12/15/17 08:13 36.8 89 16 148/76 (100) 95 Room Air  


 


12/15/17 08:00     95 Room Air  


 


12/15/17 06:48 36.7 95 18 176/96 (122) 93 Room Air  


 


12/14/17 23:30      Room Air  


 


12/14/17 23:13 36.6 83 16 150/91 (110) 93 Room Air  


 


12/14/17 20:02     95 Room Air  


 


12/14/17 19:50 36.7 76 20 151/86 (107) 95 Room Air  


 


12/14/17 15:58 36.7 68 20 134/76 (95) 93 Room Air  


 


12/14/17 15:15      Room Air  


 


12/14/17 11:37 36.6 69 18 143/76 (98) 95 Room Air  











Assessment & Plan


Assessment:


POD #2, Left TKA


Plan:


pt/ ot


DVT Proph- ASA


D/C planning- Home w HH TODAY


As per medicine.








Inhouse Planning


Pain Management:  Ultram, Norco


DVT Prophylaxis:  TEDs, SCDs, ASA





Discharge Planning


Discharge Planning:  home with home health


Pain Management:  Norco


DVT Prophylaxis:  TEDs, ASA


Therapy:  Physical Therapy, Occupational Therapy

## 2017-12-15 NOTE — DISCHARGE SUMMARY
DISCHARGE DIAGNOSIS:  Degenerative joint disease left knee.

 

SECONDARY DIAGNOSES:  Rheumatoid arthritis, GERD, BPH, osteoarthritis.

 

CONSULTS:  MANDI Mcgarry/Dr. Ede DO.

 

COMPLICATIONS:  None.

 

PROCEDURES:  Left total knee arthroplasty performed by Dr. Cooper in

12/13/2017.

 

BRIEF HISTORY:  As dictated in history and physical.

 

HOSPITAL SUMMARY:  The patient was admitted on the above-noted date and had

the above-noted surgery performed which the patient tolerated well.  On first

postoperative day, he was feeling well and pain was controlled and had no

complaints.  Calves were soft, nontender, neurovascularly intact.  Dressings

clean, dry and intact.  Toes were mobile.  Vital signs were stable.  He was

afebrile.  Hemoglobin was 9.2.  He had mild increase in his systolic blood

pressure which was treated accordingly by medicine service.  By his second

postoperative day, he was feeling well, pain was controlled, calves were

soft, nontender, neurovascularly intact.  Dressings clean, dry and intact. 

Toes were mobile.  Vital signs were stable.  He was afebrile.  He was

progressing well with his physical therapy and remained medically stable and

it was felt that he could be discharged to home with home health services. 

For further review, please see chart.

 

LAB AND X-RAY DATA:  As per chart.

 

DISCHARGE INSTRUCTIONS:  The patient was discharged to home in satisfactory

condition on 12/15/2017.

 

DIET:  Regular.

 

ACTIVITY:  Follow TKA instruction sheets and special care instructions as

noted.  Follow up with Dr. Cooper in 2 weeks.  The patient to call for

appointment if one has not been made for you.

 

DISCHARGE MEDICATIONS:  Aspirin 81 mg p.o. b.i.d., Norco 5/325 1-2 tabs p.o.

q. 4 hours p.r.n.  Resume home meds as listed and stop taking Tylenol at

home.

## 2018-02-16 ENCOUNTER — HOSPITAL ENCOUNTER (OUTPATIENT)
Dept: HOSPITAL 45 - C.LABMFLN | Age: 78
Discharge: HOME | End: 2018-02-16
Attending: FAMILY MEDICINE
Payer: COMMERCIAL

## 2018-02-16 DIAGNOSIS — N17.9: Primary | ICD-10-CM

## 2018-02-16 LAB
BUN SERPL-MCNC: 47 MG/DL (ref 7–18)
CALCIUM SERPL-MCNC: 9.2 MG/DL (ref 8.5–10.1)
CO2 SERPL-SCNC: 25 MMOL/L (ref 21–32)
CREAT SERPL-MCNC: 2.49 MG/DL (ref 0.6–1.4)
GLUCOSE SERPL-MCNC: 99 MG/DL (ref 70–99)
POTASSIUM SERPL-SCNC: 3.6 MMOL/L (ref 3.5–5.1)
SODIUM SERPL-SCNC: 140 MMOL/L (ref 136–145)

## 2018-02-25 ENCOUNTER — HOSPITAL ENCOUNTER (INPATIENT)
Dept: HOSPITAL 45 - C.EDB | Age: 78
LOS: 11 days | Discharge: HOME HEALTH | DRG: 872 | End: 2018-03-08
Attending: HOSPITALIST | Admitting: HOSPITALIST
Payer: COMMERCIAL

## 2018-02-25 VITALS
BODY MASS INDEX: 24.13 KG/M2 | WEIGHT: 144.84 LBS | HEIGHT: 65 IN | BODY MASS INDEX: 24.13 KG/M2 | HEIGHT: 65 IN | WEIGHT: 144.84 LBS

## 2018-02-25 VITALS
HEART RATE: 96 BPM | TEMPERATURE: 98.24 F | DIASTOLIC BLOOD PRESSURE: 88 MMHG | SYSTOLIC BLOOD PRESSURE: 154 MMHG | OXYGEN SATURATION: 95 %

## 2018-02-25 VITALS — OXYGEN SATURATION: 95 %

## 2018-02-25 VITALS
HEART RATE: 98 BPM | DIASTOLIC BLOOD PRESSURE: 61 MMHG | SYSTOLIC BLOOD PRESSURE: 105 MMHG | TEMPERATURE: 98.06 F | OXYGEN SATURATION: 98 %

## 2018-02-25 DIAGNOSIS — Z96.641: ICD-10-CM

## 2018-02-25 DIAGNOSIS — M06.9: ICD-10-CM

## 2018-02-25 DIAGNOSIS — I10: ICD-10-CM

## 2018-02-25 DIAGNOSIS — R33.9: ICD-10-CM

## 2018-02-25 DIAGNOSIS — N40.0: ICD-10-CM

## 2018-02-25 DIAGNOSIS — N13.6: ICD-10-CM

## 2018-02-25 DIAGNOSIS — A41.01: Primary | ICD-10-CM

## 2018-02-25 DIAGNOSIS — D64.89: ICD-10-CM

## 2018-02-25 DIAGNOSIS — K21.9: ICD-10-CM

## 2018-02-25 DIAGNOSIS — N12: ICD-10-CM

## 2018-02-25 DIAGNOSIS — N17.9: ICD-10-CM

## 2018-02-25 DIAGNOSIS — Z82.49: ICD-10-CM

## 2018-02-25 DIAGNOSIS — N30.91: ICD-10-CM

## 2018-02-25 DIAGNOSIS — N28.1: ICD-10-CM

## 2018-02-25 DIAGNOSIS — Z79.52: ICD-10-CM

## 2018-02-25 DIAGNOSIS — N10: ICD-10-CM

## 2018-02-25 DIAGNOSIS — Z96.652: ICD-10-CM

## 2018-02-25 DIAGNOSIS — Z87.891: ICD-10-CM

## 2018-02-25 LAB
BASOPHILS # BLD: 0 K/UL (ref 0–0.2)
BASOPHILS NFR BLD: 0 %
BUN SERPL-MCNC: 49 MG/DL (ref 7–18)
BUN SERPL-MCNC: 53 MG/DL (ref 7–18)
CALCIUM SERPL-MCNC: 8.6 MG/DL (ref 8.5–10.1)
CALCIUM SERPL-MCNC: 9 MG/DL (ref 8.5–10.1)
CO2 SERPL-SCNC: 29 MMOL/L (ref 21–32)
CO2 SERPL-SCNC: 30 MMOL/L (ref 21–32)
CREAT SERPL-MCNC: 2.11 MG/DL (ref 0.6–1.4)
CREAT SERPL-MCNC: 2.47 MG/DL (ref 0.6–1.4)
EOS ABS #: 0.02 K/UL (ref 0–0.5)
EOSINOPHIL NFR BLD AUTO: 182 K/UL (ref 130–400)
GLUCOSE SERPL-MCNC: 90 MG/DL (ref 70–99)
GLUCOSE SERPL-MCNC: 91 MG/DL (ref 70–99)
HCT VFR BLD CALC: 32.1 % (ref 42–52)
HGB BLD-MCNC: 10.1 G/DL (ref 14–18)
IG#: 0.09 K/UL (ref 0–0.02)
IMM GRANULOCYTES NFR BLD AUTO: 2.7 %
LYMPHOCYTES # BLD: 0.56 K/UL (ref 1.2–3.4)
MCH RBC QN AUTO: 26.5 PG (ref 25–34)
MCHC RBC AUTO-ENTMCNC: 31.5 G/DL (ref 32–36)
MCV RBC AUTO: 84.3 FL (ref 80–100)
MONO ABS #: 0.91 K/UL (ref 0.11–0.59)
MONOCYTES NFR BLD: 4.4 %
NEUT ABS #: 19.33 K/UL (ref 1.4–6.5)
NEUTROPHILS # BLD AUTO: 0.1 %
NEUTROPHILS NFR BLD AUTO: 92.4 %
PMV BLD AUTO: 10.3 FL (ref 7.4–10.4)
POTASSIUM SERPL-SCNC: 4.2 MMOL/L (ref 3.5–5.1)
POTASSIUM SERPL-SCNC: 4.6 MMOL/L (ref 3.5–5.1)
RED CELL DISTRIBUTION WIDTH CV: 23.5 % (ref 11.5–14.5)
RED CELL DISTRIBUTION WIDTH SD: 70 FL (ref 36.4–46.3)
SODIUM SERPL-SCNC: 139 MMOL/L (ref 136–145)
SODIUM SERPL-SCNC: 139 MMOL/L (ref 136–145)
WBC # BLD AUTO: 20.91 K/UL (ref 4.8–10.8)

## 2018-02-25 RX ADMIN — FINASTERIDE SCH MG: 5 TABLET, FILM COATED ORAL at 19:50

## 2018-02-25 RX ADMIN — FLUCONAZOLE SCH MLS/HR: 2 INJECTION, SOLUTION INTRAVENOUS at 20:27

## 2018-02-25 RX ADMIN — FERROUS SULFATE TAB EC 325 MG (65 MG FE EQUIVALENT) SCH MG: 325 (65 FE) TABLET DELAYED RESPONSE at 17:44

## 2018-02-25 RX ADMIN — POTASSIUM CHLORIDE SCH MEQ: 1500 TABLET, EXTENDED RELEASE ORAL at 19:49

## 2018-02-25 RX ADMIN — RANITIDINE SCH MG: 150 TABLET ORAL at 19:50

## 2018-02-25 RX ADMIN — OXYCODONE HYDROCHLORIDE AND ACETAMINOPHEN PRN TAB: 5; 325 TABLET ORAL at 19:48

## 2018-02-25 RX ADMIN — SODIUM CHLORIDE SCH MLS/HR: 900 INJECTION, SOLUTION INTRAVENOUS at 17:43

## 2018-02-25 RX ADMIN — Medication SCH MG: at 19:49

## 2018-02-25 NOTE — EMERGENCY ROOM VISIT NOTE
History


First contact with patient:  13:10


Chief Complaint:  FLANK PAIN


Stated Complaint:  RECENT ACUTE RENAL FAILURE,SEVERE LEFT FLANK PAIN





History of Present Illness


The patient is a 77 year old male who presents to the Emergency Room with 

complaints of left back pain.  The patient was recently hospitalized at 

[facility] for acute renal failure from February 2 - February 14.  His 

wife also states that she thinks he had a UTI at that time.  She does not know 

what antibiotic he was on.  She states that his urine has cleared but he has a 

catheter in place and she noticed over the past 2 days that the urine looks 

more red and has sediment in the urine.  The home health nurse did a urine 

micral on him yesterday and it revealed positive blood positive nitrates and 

positive leukocytes.  This was sent to his family physician, Dr. Trinidad but 

since it was a week and it was not addressed yet.  The patient also states that 

he has some burning sensation over the suprapubic region.  Due to having the 

catheter he cannot say if he is having urgency or frequency.  The patient 

denies any fever.  The wife also stated that he had repeat labs done on 

February 22 and she thinks his creatinine was 2 but does not know what his BUN 

was at that time.  He was taking 60 mg of prednisone daily but just dropped 

down to 40 mg daily.  He has been seeing Dr. Mcmahon, nephrology but the wife 

states they would like to get switched over to a nephrologist with [facility] since he is being switched over to Dr. Segura and Dr. nick at [facility] urology and has an appointment set up on Friday.  This is for his 

prostate.  Since her family doctor is Dr. Trinidad, [facility] physician group 

they would like to get all their physicians within [facility] group.





Review of Systems


10 system review was performed and was negative unless stated otherwise history 

of present illness.





Past Medical/Surgical History


Medical Problems:


(1) BPH (benign prostatic hyperplasia)


(2) Chronic anemia


(3) Renal cyst


(4) Rheumatoid arthritis


Surgical Problems:


(1) H/O inguinal hernia repair


(2) History of left knee replacement


(3) History of right hip replacement


(4) Hx of nasal septoplasty


(5) S/P carpal tunnel release


(6) S/P rotator cuff repair


Acute renal failure





Family History





FH: CAD (coronary artery disease)


  BROTHER





Social History


Smoking Status:  Former Smoker


Alcohol Use:  none


Marital Status:  


Housing Status:  lives with family


Occupation Status:  retired





Current/Historical Medications


Scheduled


Ascorbic Acid (Ascorbic Acid), Unknown Dose PO BID


Aspirin (Aspirin EC Low Dose), 81 MG PO BID


Docusate Sodium (Colace), 1 CAP PO HS


Ferrous Sulfate (Kp Ferrous Sulfate), 1 TAB PO BID


Finasteride (Proscar), 5 MG PO HS


Folic Acid (Folic Acid), 1 TAB PO QAM


Metoprolol Tartrate (Lopressor) (Lopressor), 25 MG PO DAILY


Multivitamin (Multivitamin), 1 TABLET PO QAM


Omeprazole (Prilosec), 20 MG PO QAM


Potassium Chloride (Micro-K Ext Rel), 20 MEQ PO BID


Prednisone (Prednisone), 40 MG PO DAILY


Ranitidine (Zantac), 150 MG PO BID


Tamsulosin Hcl (Flomax), 0.4 MG PO DAILY





Scheduled PRN


Acetaminophen (Tylenol), 1,000 MG PO UD PRN for Pain





Physical Exam


Vital Signs











  Date Time  Temp Pulse Resp B/P (MAP) Pulse Ox O2 Delivery O2 Flow Rate FiO2


 


2/25/18 13:43  78 18 161/62 95 Room Air  


 


2/25/18 11:47 36.6 74 20 183/88 98 Room Air  











Physical Exam


GENERAL: 77-year-old male appears in no acute distress.


MENTAL Status: Alert and oriented 3.


MOUTH: Mucosa is moist


NECK: Supple, no lymphadenopathy noted.  No carotid bruits noted.


LUNGS: Clear auscultation without wheezes rales or rhonchi.


CARDIAC: Regular rate and rhythm without murmur.  Pulses is full and equal 

throughout.


BACK: Left CVA tenderness noted.


ABDOMEN: Positive bowel sounds all 4 quadrants.  Soft, mild tenderness 

palpation over the suprapubic region and left lower quadrant otherwise 

nontender to palpation without organomegaly or masses.


EXTREMITIES: No cyanosis or edema noted.





Medical Decision & Procedures


Laboratory Results


2/25/18 13:15








Red Blood Count 3.81, Mean Corpuscular Volume 84.3, Mean Corpuscular Hemoglobin 

26.5, Mean Corpuscular Hemoglobin Concent 31.5, Mean Platelet Volume 10.3, 

Neutrophils (%) (Auto) 92.4, Lymphocytes (%) (Auto) 2.7, Monocytes (%) (Auto) 

4.4, Eosinophils (%) (Auto) 0.1, Basophils (%) (Auto) 0.0, Neutrophils # (Auto) 

19.33, Lymphocytes # (Auto) 0.56, Monocytes # (Auto) 0.91, Eosinophils # (Auto) 

0.02, Basophils # (Auto) 0.00





2/25/18 13:15

















Test


  2/25/18


13:15 2/25/18


14:08


 


White Blood Count


  20.91 K/uL


(4.8-10.8) 


 


 


Red Blood Count


  3.81 M/uL


(4.7-6.1) 


 


 


Hemoglobin


  10.1 g/dL


(14.0-18.0) 


 


 


Hematocrit 32.1 % (42-52)  


 


Mean Corpuscular Volume


  84.3 fL


() 


 


 


Mean Corpuscular Hemoglobin


  26.5 pg


(25-34) 


 


 


Mean Corpuscular Hemoglobin


Concent 31.5 g/dl


(32-36) 


 


 


Platelet Count


  182 K/uL


(130-400) 


 


 


Mean Platelet Volume


  10.3 fL


(7.4-10.4) 


 


 


Neutrophils (%) (Auto) 92.4 %  


 


Lymphocytes (%) (Auto) 2.7 %  


 


Monocytes (%) (Auto) 4.4 %  


 


Eosinophils (%) (Auto) 0.1 %  


 


Basophils (%) (Auto) 0.0 %  


 


Neutrophils # (Auto)


  19.33 K/uL


(1.4-6.5) 


 


 


Lymphocytes # (Auto)


  0.56 K/uL


(1.2-3.4) 


 


 


Monocytes # (Auto)


  0.91 K/uL


(0.11-0.59) 


 


 


Eosinophils # (Auto)


  0.02 K/uL


(0-0.5) 


 


 


Basophils # (Auto)


  0.00 K/uL


(0-0.2) 


 


 


RDW Standard Deviation


  70.0 fL


(36.4-46.3) 


 


 


RDW Coefficient of Variation


  23.5 %


(11.5-14.5) 


 


 


Immature Granulocyte % (Auto) 0.4 %  


 


Immature Granulocyte # (Auto)


  0.09 K/uL


(0.00-0.02) 


 


 


Anisocytosis PRESENT  


 


Anion Gap


  7.0 mmol/L


(3-11) 


 


 


Est Creatinine Clear Calc


Drug Dose 25.5 ml/min 


  


 


 


Estimated GFR (


American) 34.0 


  


 


 


Estimated GFR (Non-


American 29.3 


  


 


 


BUN/Creatinine Ratio 23.4 (10-20)  


 


Calcium Level


  9.0 mg/dl


(8.5-10.1) 


 


 


Urine Color  DK YELLOW 


 


Urine Appearance  CLOUDY (CLEAR) 


 


Urine pH  8.5 (4.5-7.5) 


 


Urine Specific Gravity


  


  1.016


(1.000-1.030)


 


Urine Protein  1+ (NEG) 


 


Urine Glucose (UA)  NEG (NEG) 


 


Urine Ketones  NEG (NEG) 


 


Urine Occult Blood  3+ (NEG) 


 


Urine Nitrite  POS (NEG) 


 


Urine Bilirubin  NEG (NEG) 


 


Urine Urobilinogen  NEG (NEG) 


 


Urine Leukocyte Esterase  LARGE (NEG) 


 


Urine WBC (Auto)  >30 /hpf (0-5) 


 


Urine RBC (Auto)  >30 /hpf (0-4) 


 


Urine Hyaline Casts (Auto)  1-5 /lpf (0-5) 


 


Urine Epithelial Cells (Auto)  0-5 /lpf (0-5) 


 


Urine Bacteria (Auto)  1+ (NEG) 











Medications Administered











 Medications


  (Trade)  Dose


 Ordered  Sig/Juaquin


 Route  Start Time


 Stop Time Status Last Admin


Dose Admin


 


 Acetaminophen/


 Hydrocodone Bitart


  (Norco 5/325 Tab)  1 tab  NOW  STAT


 PO  2/25/18 13:26


 2/25/18 13:30 DC 2/25/18 13:37


1 TAB











ED Course


The patient was evaluated.  Patient is EMR medication list were reviewed.  The 

patient's last renal profile that we have on record here from February 16, 2018 

reveal a BUN of 47 and a creatinine of 2.49.  I am having the  get 

the recent records from [facility] for his recent hospitalization from 

February 2 - February 14.  IV access was obtained.  CBC and renal profile was 

ordered.  Urinalysis and culture was also ordered.  The patient was given Norco 

5/325 mg 1 tablet p.o. for pain.  Labs are reviewed.  The patient's white count 

was elevated at 20,000.  His BUN was 49 and creatinine 2.11.  Urinalysis 

revealed positive leukocytes, positive blood positive nitrates and positive 

bacteria.  Culture is pending.  Blood cultures were ordered.  Lactic acid was 

ordered.  The patient was given Rocephin 1 g IV.  The patient was independently 

evaluated by Dr. Dolan who agreed with treatment plan.  The patient was 

informed of all findings.  The hospitalist was consulted for admission.





Medical Decision


Differential diagnoses include UTI, pyelonephritis, sepsis, ureteral calculi, 

acute renal failure





Due to the patient's recent history of hospitalization with acute renal failure 

and UTI my suspicions were high for the same at this time.





[place] Drug Monitoring Program


Search Results:  patient reviewed within database





Medication Reconcilliation


Current Medication List:  was personally reviewed by me





Blood Pressure Screening


Patient's blood pressure:  Elevated blood pressure


Blood pressure disposition:  Elevated BP felt to be situational





Impression





 Primary Impression:  


 Urinary tract infection


 Additional Impressions:  


 Acute renal failure


 Leukocytosis





Departure Information


Dispostion


Being Evaluated By Hospitalist





Condition


GOOD





Referrals


Arnaldo Trinidad M.D. (PCP)





Patient Instructions


My [facility]





Problem Qualifiers








 Primary Impression:  


 Urinary tract infection


 Urinary tract infection type:  acute cystitis  Hematuria presence:  with 

hematuria  Qualified Codes:  N30.01 - Acute cystitis with hematuria


 Additional Impressions:  


 Acute renal failure


 Acute renal failure type:  unspecified  Qualified Codes:  N17.9 - Acute kidney 

failure, unspecified


 Leukocytosis


 Leukocytosis type:  unspecified  Qualified Codes:  D72.829 - Elevated white 

blood cell count, unspecified

## 2018-02-25 NOTE — HISTORY AND PHYSICAL
History & Physical


Date & Time of Service:


Feb 25, 2018 at 15:27


Chief Complaint:


Recent Acute Renal Failure,Severe Left Flank Pain


Primary Care Physician:


Arnaldo Trinidad M.D.


History of Present Illness


76 yo male PMH: Rheumatoid arthritis on prednisone, BPH, Anemia of chronic 

disease, HTN, HLD, primary OA, presents to ED with 12 hour hx of Left-sided CVA 

pain and suprapubic pain on top of a 3 day hx of increasing sediment, pus, and 

orange color of urine in his indwelling catheter. He was recently admitted to 

[facility] from 2/2-14/18 for LINDA in setting of urinary retention 2/2 

worsening BPH.  During that admission he was found to have left sided 

hydroureteronephrosis. Was treated for complicated UTI with rocephin x 5 days. 

He had been seen by urology who had discussed possibility of inserting a stent 

but as he clinically improved, they decided to opt for outpatient follow up, 

which has not occurred yet. He has been afebrile, no chills/nausea/vomiting/

chest pain/dyspnea





Past Medical/Surgical History


Medical Problems:


(1) BPH (benign prostatic hyperplasia)


(2) Chronic anemia


(3) CVA tenderness


(4) Renal cyst


(5) Rheumatoid arthritis


(6) Suprapubic pain, acute


Surgical Problems:


(1) H/O inguinal hernia repair


(2) History of left knee replacement


(3) History of right hip replacement


(4) Hx of nasal septoplasty


(5) S/P carpal tunnel release


(6) S/P rotator cuff repair





Family History





FH: CAD (coronary artery disease)


  BROTHER





Social History


Smoking Status:  Former Smoker


Marital Status:  


Housing status:  lives with family


Occupational Status:  retired





Multi-Drug Resistant Organisms


History of MDRO:  No





Allergies


Coded Allergies:  


     Lisinopril (Verified  Adverse Reaction, Mild, COUGH, 12/13/17)





Home Medications


Scheduled


Ascorbic Acid (Ascorbic Acid), Unknown Dose PO BID


Aspirin (Aspirin EC Low Dose), 81 MG PO BID


Docusate Sodium (Colace), 1 CAP PO HS


Ferrous Sulfate (Kp Ferrous Sulfate), 1 TAB PO BID


Finasteride (Proscar), 5 MG PO HS


Folic Acid (Folic Acid), 1 TAB PO QAM


Metoprolol Tartrate (Lopressor) (Lopressor), 25 MG PO DAILY


Multivitamin (Multivitamin), 1 TABLET PO QAM


Omeprazole (Prilosec), 20 MG PO QAM


Potassium Chloride (Micro-K Ext Rel), 20 MEQ PO BID


Prednisone (Prednisone), 40 MG PO DAILY


Ranitidine (Zantac), 150 MG PO BID


Tamsulosin Hcl (Flomax), 0.4 MG PO DAILY





Scheduled PRN


Acetaminophen (Tylenol), 1,000 MG PO UD PRN for Pain





Review of Systems


Constitutional:  + weight loss (30 pounds in last several months), No fever, No 

chills, No sweats, No weakness, No fatigue, No problem reported


Respiratory:  No cough, No sputum, No wheezing, No shortness of breath, No 

dyspnea on exertion, No dyspnea at rest, No hemoptysis, No problem reported


Cardiovascular:  No chest pain, No orthopnea, No PND, No claudication, No 

palpitations, No problem reported


Abdomen:  + pain (left flank and suprapubic), No nausea, No vomiting, No 

diarrhea, No constipation, No GI bleeding, No problem reported


Genitourinary - Male:  + hematuria, + urinary retention (indwelling catheter), 

+ problem reported (chronic worsening BPH)





Physical Exam


Vital Signs











  Date Time  Temp Pulse Resp B/P (MAP) Pulse Ox O2 Delivery O2 Flow Rate FiO2


 


2/25/18 13:43  78 18 161/62 95 Room Air  


 


2/25/18 11:47 36.6 74 20 183/88 98 Room Air  








General Appearance:  WD/WN, no apparent distress


Head:  normocephalic, atraumatic


Eyes:  normal inspection, PERRL, EOMI, sclerae normal


ENT:  hearing grossly normal, pharynx normal, + pertinent finding (dry mucous 

membranes)


Neck:  no JVD, no carotid bruits, trachea midline


Respiratory/Chest:  chest non-tender, lungs clear, normal breath sounds, no 

respiratory distress, no accessory muscle use


Cardiovascular:  regular rate, rhythm, no JVD, no murmur, normal peripheral 

pulses


Abdomen/GI:  normal bowel sounds, soft, + tenderness (Left flank, suprapubic)


Back:  normal inspection, + left CVA tenderness


Extremities/Musculoskelatal:  normal inspection, no calf tenderness, normal 

range of motion, non-tender, + pedal edema (trace to 1+ bilaterally)


Neurologic/Psych:  CNs II-XII nml as tested, no motor/sensory deficits, alert, 

normal mood/affect, oriented x 3


Skin:  normal color, warm/dry, no rash





Diagnostics


Laboratory Results





Results Past 24 Hours








Test


  2/25/18


13:15 2/25/18


14:08 Range/Units


 


 


White Blood Count 20.91  4.8-10.8  K/uL


 


Red Blood Count 3.81  4.7-6.1  M/uL


 


Hemoglobin 10.1  14.0-18.0  g/dL


 


Hematocrit 32.1  42-52  %


 


Mean Corpuscular Volume 84.3    fL


 


Mean Corpuscular Hemoglobin 26.5  25-34  pg


 


Mean Corpuscular Hemoglobin


Concent 31.5


  


  32-36  g/dl


 


 


Platelet Count 182  130-400  K/uL


 


Mean Platelet Volume 10.3  7.4-10.4  fL


 


Neutrophils (%) (Auto) 92.4   %


 


Lymphocytes (%) (Auto) 2.7   %


 


Monocytes (%) (Auto) 4.4   %


 


Eosinophils (%) (Auto) 0.1   %


 


Basophils (%) (Auto) 0.0   %


 


Neutrophils # (Auto) 19.33  1.4-6.5  K/uL


 


Lymphocytes # (Auto) 0.56  1.2-3.4  K/uL


 


Monocytes # (Auto) 0.91  0.11-0.59  K/uL


 


Eosinophils # (Auto) 0.02  0-0.5  K/uL


 


Basophils # (Auto) 0.00  0-0.2  K/uL


 


RDW Standard Deviation 70.0  36.4-46.3  fL


 


RDW Coefficient of Variation 23.5  11.5-14.5  %


 


Immature Granulocyte % (Auto) 0.4   %


 


Immature Granulocyte # (Auto) 0.09  0.00-0.02  K/uL


 


Anisocytosis PRESENT   


 


Sodium Level 139  136-145  mmol/L


 


Potassium Level 4.2  3.5-5.1  mmol/L


 


Chloride Level 103    mmol/L


 


Carbon Dioxide Level 29  21-32  mmol/L


 


Anion Gap 7.0  3-11  mmol/L


 


Blood Urea Nitrogen 49  7-18  mg/dl


 


Creatinine


  2.11


  


  0.60-1.40


mg/dl


 


Est Creatinine Clear Calc


Drug Dose 25.5


  


   ml/min


 


 


Estimated GFR (


American) 34.0


  


   


 


 


Estimated GFR (Non-


American 29.3


  


   


 


 


BUN/Creatinine Ratio 23.4  10-20  


 


Random Glucose 90  70-99  mg/dl


 


Calcium Level 9.0  8.5-10.1  mg/dl


 


Urine Color  DK YELLOW  


 


Urine Appearance  CLOUDY CLEAR  


 


Urine pH  8.5 4.5-7.5  


 


Urine Specific Gravity  1.016 1.000-1.030  


 


Urine Protein  1+ NEG  


 


Urine Glucose (UA)  NEG NEG  


 


Urine Ketones  NEG NEG  


 


Urine Occult Blood  3+ NEG  


 


Urine Nitrite  POS NEG  


 


Urine Bilirubin  NEG NEG  


 


Urine Urobilinogen  NEG NEG  


 


Urine Leukocyte Esterase  LARGE NEG  


 


Urine WBC (Auto)  >30 0-5  /hpf


 


Urine RBC (Auto)  >30 0-4  /hpf


 


Urine Hyaline Casts (Auto)  1-5 0-5  /lpf


 


Urine Epithelial Cells (Auto)  0-5 0-5  /lpf


 


Urine Bacteria (Auto)  1+ NEG  








Microbiology Results


2/25/18 Blood Culture, Ordered


          Pending


2/25/18 Blood Culture, Ordered


          Pending


2/25/18 Urine Culture, Received


          Pending





Diagnostic Radiology


CT OF THE ABDOMEN AND PELVIS WITHOUT CONTRAST, STONE PROTOCOL





CLINICAL HISTORY: Left flank pain.    





COMPARISON STUDY:  None.





TECHNIQUE: Helical axial images of the abdomen and pelvis were obtained without


IV or oral contrast according to renal stone protocol.  A dose lowering


technique was utilized adhering to the principles of ALARA.





FINDINGS: Unenhanced images of the liver, spleen, adrenal glands are


unremarkable with exception of calcified granulomas within the spleen. There is


moderate dilatation of the pancreatic duct to the level the ampulla, measuring 6


mm in caliber. There is no biliary ductal dilatation. There is no pancreatic


glandular atrophy. No pancreatic mass is identified although sensitivity is


diminished on this unenhanced exam. There is no pneumatosis, free air or portal


venous gas. There is no evidence for a bowel obstruction. There is left colon


diverticulosis without evidence for acute diverticulitis. The appendix is


normal. There is no lymphadenopathy. There are numerous bilateral renal lesions.


These are suboptimally assessed on this exam but the majority measure water


attenuation and likely reflect cysts. There is a 1.5 cm hypodense lesion within


the midpole of the right kidney. There is a complex 4 cm cystic lesion within


the midpole of the left kidney which contains thin septations with thin


calcification. There is moderate left hydroureteronephrosis with moderate


perinephric infiltration. The left ureter is dilated to the level of the distal


left ureter. As a 3 mm calculus within the left lateral aspect of the bladder.


There is marked enlargement of the prostate. The prostate indents the base of


the bladder. A Lutz balloon is present within the bladder. Evaluation of the


bladder suboptimal given lack of contrast as well as streak artifact from right


hip arthroplasty. There are punctate bilateral renal calculi. There are no


suspicious osseous lesions.





IMPRESSION:  





1. Moderate left hydroureteronephrosis with dilatation of the left ureter to the


level the distal ureter. No ureteral calculi. Moderate left perinephric


infiltration. Differential considerations for the dilatation include a recently


passed calculus, occult distal left ureteral/bladder urothelial lesion or


infectious process. Urologic consultation might be considered. A short-term


follow-up CT could be obtained to evaluate for resolution of the left


hydroureteronephrosis. No right hydronephrosis. Marked enlargement of the


prostate.





2. Punctate bilateral nephrolithiasis.





3. Numerous bilateral renal lesions are suboptimally assessed on this exam but


likely reflect cysts. 4 cm cystic left renal lesion contains thin septations


within calcification. This is probably benign but a follow-up nonemergent renal


protocol CT or MRI is recommended. 1.5 cm hypodense right renal lesion which


favors a hyperdense cyst which can be assessed on subsequent study.











Electronically signed by:  Devon Casiano M.D.


2/25/2018 4:41 PM





Dictated Date/Time:  2/25/2018 4:28 PM





Impression


Assessment and Plan


76 yo male PMH: Rheumatoid arthritis on prednisone, BPH, Anemia of chronic 

disease, HTN, HLD, primary OA, presented to ED with 12 hour hx of Left-sided 

CVA pain and suprapubic pain on top of a 3 day hx of increasing sediment, pus, 

and orange color of urine in his indwelling catheter. Recent admission to [facility] 

from 2/2-14/18 for LINDA in setting of urinary retention 2/2 worsening BPH.  

During that admission he was found to have left sided hydroureteronephrosis. 

Was treated for complicated UTI with rocephin x 5 days. He had been seen by 

urology who had discussed possibility of inserting a stent but as he clinically 

improved, they decided to opt for outpatient follow up, which has not occurred 

yet. He has been afebrile, no chills/nausea/vomiting/chest pain/dyspnea. CT 

showed moderate left hydroureteronephrosis with dilatation of the left ureter 

to the level the distal ureter. No ureteral calculi. Moderate left perinephric 

infiltration. 





Pyelonephritis/Left VUJ obstruction/L Hydroureteronephrosis


Ceftriaxone x 1 G given in ED; will continue as clinically stable; if becomes 

more septic, in which case change to zosyn


Urology consulted


[facility] 22 Lactate 2.4: repeat at 1830


1 mg dilaudid given in ED


Percocet prn q4; consider other options in setting of LINDA (fentanyl)


 mL/hr


NPO until urology assesses.


Considering recent admission of L VUJ obstruction, with worsening symptoms, 

would likely benefit from intervention/stenting





Acute Kidney Injury


Cr: 2.11, at baseline from recent admission to [facility], but far worse than prior 

relatively recent baseline


Continue to hold amlodipine 5 (held prior to admission)


IVF 100mL/hr


Repeat BMP at 1830





BPH, chronic x 15 years, worsening


Continue flomax and finasteride


Patient started on indwelling catheter at [facility]





Microcytic anemia of chronic disease


Likely related to RA


Hgb 10.1


Continue iron supplementation


Had been given procrit at [facility] as well as 4 units of venofer





RA


Continue to hold leflunomide 2/2 recent weight loss


Steroid taper from recent hospitalization, converted from 60 mg to 40 mg today; 

he is unsure of steroid taper length/course


Takes 5 mg daily at baseline





HTN


Metoprolol 25 mg OD





HLD


ASA 81 mg








DVTP: Heparin


Code: Full


Dispo: Med/surg. Would involve PT/OT after decision re: urology intervention





Resident Physician Supervision Note:





I interviewed and examined the patient. Discussed with Dr. Hernandez and 

agree with findings and plan as documented in the note. Any exceptions or 

clarifications are listed here: None





Documented By:  Omar Issa


flank pain, worsening suprapubic pain and increased sediment in cath


no f/c/s


ongoing ureteral obstruction


vitals noted fatigued but nad breathing unlabored


CT reviweed personally as well as report, labs reviewed


dr hernandez d/w urology who notes they'll see pt today





flank pain / hydronephrosis / subacute renal failure (?how much reversible) / 

infection (recurrent vs resolving?) w septic picture (but not severe fortunately

)


-abx, urology eval - anticipate cysto and stenting in near future





Level of Care


Med/Surg





Resuscitation Status


FULL RESUSCITATION





VTE Prophylaxis


VTE Risk Assessment Done? Y/N:  Yes


Risk Level:  Moderate


Given or contraindicated:  Unfractionated heparin SQ

## 2018-02-25 NOTE — UROLOGY CONSULTATION
History


General


Date of Service:


Feb 25, 2018.


Chief Complaint:  Retention. Left [place]/Pyelo


Primary Care Physician:


Arnaldo Trinidad M.D.


Pt seen a urologist before?:  Yes





History of Present Illness


Presents with UTI and flank pain on left.  Has history of Retention and UTI 

with significant BPH.  Had catheter placed 1 month ago due to retention and 

UTI.  Has been draining well but recently developed new and worsening symptoms 

with discharge from catheter and flank pain on left radiating to abd and 

pelvis. Comes in waves and moderate at times.  Improved currently with 

hydration and supportive care. 


Admitted for IV abx and monitoring. 





Imaging shows hydroureter to bladder without obvious obstructing stone or other 

blockage.  Large prostate partially obscured by hip implant artifact.  notable 

thickening of bladder and ? large median lobe of prostate. 





had seen urologist approx 1 year ago for worsening issues. Currently on dual 

therapy but worsening urinary problems.





Laboratory


Labs were reviewed and are within normal limits unless listed below. Labs are 

available in the chart and at [facility]





Problem List


Medical Problems:


(1) Acute renal failure


Status: Acute  





(2) Leukocytosis


Status: Acute  





(3) Urinary tract infection


Status: Acute  











Past History


arthritis


Past Surgical History:  orthopedic surgery





Family History





FH: CAD (coronary artery disease)


  BROTHER





Social History


Hx Tobacco Use In Past Year?:  No (SMOKED QUIT 40+ YRS AGO)


Marital status:  


Housing status:  lives with family


Occupation status:  retired





History of MDRO


No





Allergies


Coded Allergies:  


     Lisinopril (Verified  Adverse Reaction, Mild, COUGH, 12/13/17)





Medications


Home Medications:





Home Meds and Scripts








 Medications  Dose


 Route/Sig


 Max Daily Dose Days Date Category


 


 Tylenol


  (Acetaminophen)


 500 Mg Tab  1,000 Mg


 PO UD PRN


    2/25/18 Reported


 


 Flomax


  (Tamsulosin Hcl)


 0.4 Mg Cap  0.4 Mg


 PO DAILY


    2/25/18 Reported


 


 Zantac


  (Ranitidine HCl)


 150 Mg Tab  150 Mg


 PO BID


    2/25/18 Reported


 


 Ascorbic Acid 500


 Mg Tab  Unknown Dose


 PO BID


    2/25/18 Reported


 


 Micro-K Ext Rel


  (Potassium


 Chloride) 10 Meq


 Capcr  20 Meq


 PO BID


    2/25/18 Reported


 


 Prednisone 20 Mg


 Tab  40 Mg


 PO DAILY


    2/25/18 Reported


 


 Lopressor


  (Metoprolol


 Tartrate) 25 Mg


 Tab  25 Mg


 PO DAILY


    2/25/18 Reported


 


 Aspirin EC Low


 Dose (Aspirin) 81


 Mg Ectab  81 Mg


 PO BID


   30 12/15/17 Rx


 


 Colace (Docusate


 Sodium) 100 Mg Cap  1 Cap


 PO HS


   15 12/13/17 Reported


 


 Kp Ferrous


 Sulfate (Ferrous


 Sulfate) 325 Mg


 Tab  1 Tab


 PO BID


   30 8/28/17 Reported


 


 Folic Acid 1 Mg


 Tab  1 Tab


 PO QAM


    8/28/17 Reported


 


 Prilosec


  (Omeprazole) 20


 Mg Capcr  20 Mg


 PO QAM


    8/28/17 Reported


 


 Proscar


  (Finasteride) 5


 Mg Tab  5 Mg


 PO HS


    7/7/17 Reported


 


 Multivitamin


  (Multivitamins)


 Tab  1 Tablet


 PO QAM


    6/22/12 Reported








Inpatient Medications:





Current Inpatient Medications








 Medications


  (Trade)  Dose


 Ordered  Sig/Juaquin


 Route  Start Time


 Stop Time Status Last Admin


Dose Admin


 


 Acetaminophen


  (Tylenol Tab)  650 mg  Q4H  PRN


 PO  2/25/18 15:45


 3/27/18 15:44   


 


 


 Al Hydrox/Mg


 Hydrox/Simethicone


  (Maalox Max Susp)  15 ml  Q4H  PRN


 PO  2/25/18 15:45


 3/27/18 15:44   


 


 


 Magnesium


 Hydroxide


  (Milk Of


 Magnesia Susp)  30 ml  Q6H  PRN


 PO  2/25/18 15:45


 3/27/18 15:44   


 


 


 Polyethylene


  (Miralax Powder


 Packet)  17 gm  DAILY  PRN


 PO  2/25/18 15:45


 3/27/18 15:44   


 


 


 Ondansetron HCl


  (Zofran Inj)  4 mg  Q6H  PRN


 IV  2/25/18 15:45


 3/27/18 15:44   


 


 


 Aspirin


  (Ecotrin Tab)  81 mg  BID


 PO  2/25/18 20:00


 3/27/18 20:59   


 


 


 Finasteride


  (Proscar Tab)  5 mg  HS


 PO  2/25/18 21:00


 3/27/18 20:59   


 


 


 Folic Acid


  (Folvite Tab)  1 mg  QAM


 PO  2/26/18 08:00


 3/28/18 08:59   


 


 


 Metoprolol


 Tartrate


  (Lopressor Tab)  25 mg  DAILY


 PO  2/26/18 08:00


 3/28/18 08:59   


 


 


 Multivitamins


  (Multivitamin


 Tab)  1 tab  QAM


 PO  2/26/18 08:00


 3/28/18 08:59   


 


 


 Potassium Chloride


  (Klor-Con Tab)  20 meq  BID


 PO  2/25/18 20:00


 3/27/18 20:59   


 


 


 Prednisone


  (PredniSONE TAB)  40 mg  DAILY


 PO  2/26/18 08:00


 3/28/18 08:59   


 


 


 Ranitidine HCl


  (zANTac TAB)  150 mg  BID


 PO  2/25/18 20:00


 3/27/18 20:59   


 


 


 Tamsulosin HCl


  (Flomax Cap)  0.4 mg  DAILY


 PO  2/26/18 08:00


 3/28/18 08:59   


 


 


 Ferrous Sulfate


  (Feosol Tab)  325 mg  BIDM


 PO  2/25/18 17:00


 3/27/18 17:59  2/25/18 17:44


325 MG


 


 Pantoprazole


 Sodium


  (Protonix Tab)  40 mg  QAM


 PO  2/26/18 08:00


 3/28/18 08:59   


 


 


 Sodium Chloride  1,000 ml @ 


 100 mls/hr  Q10H


 IV  2/25/18 16:00


 3/27/18 15:59  2/25/18 17:43


100 MLS/HR


 


 Oxycodone/


 Acetaminophen


  (Percocet


 5-325mg Tab)  1 tab  Q4H  PRN


 PO  2/25/18 15:45


 3/11/18 15:44   


 


 


 Piperacillin Sod/


 Tazobactam Sod


 3.375 gm/Dextrose  115 ml @ 


 200 mls/hr  Q6


 IV  2/26/18 00:00


 3/8/18 00:00 UNV  


 


 


 Miscellaneous


 Information


  (Consult)  1 ea  UD  PRN


 N/A  2/25/18 19:00


 3/27/18 18:59 UNV  


 











Review of Systems


Review of Systems


All Other Systems:  Reviewed and Negative (all reviewed. pertinent positives 

and negatives in HPI)





Physical Exam


Vital Signs:





Vital Signs Past 12 Hours








  Date Time  Temp Pulse Resp B/P (MAP) Pulse Ox O2 Delivery O2 Flow Rate FiO2


 


2/25/18 17:03 36.8 96 19 154/88 (110) 95 Room Air  


 


2/25/18 16:37  89 17 151/87 95 Room Air  


 


2/25/18 15:44     95 Room Air  


 


2/25/18 15:30  90 16 157/91 95 Room Air  


 


2/25/18 13:43  78 18 161/62 95 Room Air  


 


2/25/18 11:47 36.6 74 20 183/88 98 Room Air  








Physical Exam:


General Appearance:  WD/WN, no apparent distress


Eyes:  bilateral eyes normal inspection


ENT:  normal ENT inspection, hearing grossly normal


Neck:  supple, no JVD


Respiratory/Chest:  no respiratory distress, no accessory muscle use


Cardiovascular:  regular rate, rhythm


Gastrointestinal:  


   Abdomen:  diffuse


   Bladder:  normal bladder


   Renal:  cva tenderness


Genitourinary - Male:  


   Penis:  normal penis (Alex in place draining clear urine with white sediment

)


Extremities:  normal range of motion, non-tender, normal inspection, no pedal 

edema, no calf tenderness


Neurologic/Psychiatric:  CNs II-XII nml as tested, no motor/sensory deficits, 

alert, normal mood/affect, oriented x 3


Skin:  normal color, warm/dry, no rash


Lymphatic:  no adenopathy





Assessment & Plan


Assessment & Plan


Imaging:  CT





1. Retention


2. Pyelonephritis


3. Left hydronephrosis








Plan to continue drainage and monitor for resolution of UTI/pyelo. 


Discussed option of stent and drainage. Currently doing okay with alex. Has 12 

Fr alex in for almost 1 month. Will likely need to change catheter to larger 

calibre with next change. 





Had appt with my office on this friday to establish care with one of my 

partners. 





Will likely need surgical intervention at some point due to multiple UTI and 

retention failing dual therapy for BPH.  Discussed some of these options. 





Will await urine culture and sensitivities.  Will add diet for time being. Will 

add antifungal due to sediment and concern with alex. 





Will follow. Considering changing catheter if not improving. If sudden change 

or worsening or nonresolution, may need left stent. Imaging reviewed and 

intrepretted by myself.

## 2018-02-25 NOTE — DIAGNOSTIC IMAGING REPORT
CT OF THE ABDOMEN AND PELVIS WITHOUT CONTRAST, STONE PROTOCOL



CLINICAL HISTORY: Left flank pain.    



COMPARISON STUDY:  None.



TECHNIQUE: Helical axial images of the abdomen and pelvis were obtained without

IV or oral contrast according to renal stone protocol.  A dose lowering

technique was utilized adhering to the principles of ALARA.



FINDINGS: Unenhanced images of the liver, spleen, adrenal glands are

unremarkable with exception of calcified granulomas within the spleen. There is

moderate dilatation of the pancreatic duct to the level the ampulla, measuring 6

mm in caliber. There is no biliary ductal dilatation. There is no pancreatic

glandular atrophy. No pancreatic mass is identified although sensitivity is

diminished on this unenhanced exam. There is no pneumatosis, free air or portal

venous gas. There is no evidence for a bowel obstruction. There is left colon

diverticulosis without evidence for acute diverticulitis. The appendix is

normal. There is no lymphadenopathy. There are numerous bilateral renal lesions.

These are suboptimally assessed on this exam but the majority measure water

attenuation and likely reflect cysts. There is a 1.5 cm hypodense lesion within

the midpole of the right kidney. There is a complex 4 cm cystic lesion within

the midpole of the left kidney which contains thin septations with thin

calcification. There is moderate left hydroureteronephrosis with moderate

perinephric infiltration. The left ureter is dilated to the level of the distal

left ureter. As a 3 mm calculus within the left lateral aspect of the bladder.

There is marked enlargement of the prostate. The prostate indents the base of

the bladder. A Lutz balloon is present within the bladder. Evaluation of the

bladder suboptimal given lack of contrast as well as streak artifact from right

hip arthroplasty. There are punctate bilateral renal calculi. There are no

suspicious osseous lesions.



IMPRESSION:  



1. Moderate left hydroureteronephrosis with dilatation of the left ureter to the

level the distal ureter. No ureteral calculi. Moderate left perinephric

infiltration. Differential considerations for the dilatation include a recently

passed calculus, occult distal left ureteral/bladder urothelial lesion or

infectious process. Urologic consultation might be considered. A short-term

follow-up CT could be obtained to evaluate for resolution of the left

hydroureteronephrosis. No right hydronephrosis. Marked enlargement of the

prostate.



2. Punctate bilateral nephrolithiasis.



3. Numerous bilateral renal lesions are suboptimally assessed on this exam but

likely reflect cysts. 4 cm cystic left renal lesion contains thin septations

within calcification. This is probably benign but a follow-up nonemergent renal

protocol CT or MRI is recommended. 1.5 cm hypodense right renal lesion which

favors a hyperdense cyst which can be assessed on subsequent study.







Electronically signed by:  Devon Casiano M.D.

2/25/2018 4:41 PM



Dictated Date/Time:  2/25/2018 4:28 PM

## 2018-02-26 VITALS
SYSTOLIC BLOOD PRESSURE: 124 MMHG | TEMPERATURE: 97.52 F | HEART RATE: 68 BPM | DIASTOLIC BLOOD PRESSURE: 75 MMHG | OXYGEN SATURATION: 96 %

## 2018-02-26 VITALS
OXYGEN SATURATION: 96 % | DIASTOLIC BLOOD PRESSURE: 64 MMHG | SYSTOLIC BLOOD PRESSURE: 110 MMHG | HEART RATE: 89 BPM | TEMPERATURE: 98.06 F

## 2018-02-26 VITALS
DIASTOLIC BLOOD PRESSURE: 64 MMHG | SYSTOLIC BLOOD PRESSURE: 110 MMHG | OXYGEN SATURATION: 95 % | HEART RATE: 62 BPM | TEMPERATURE: 97.52 F

## 2018-02-26 VITALS
TEMPERATURE: 97.34 F | OXYGEN SATURATION: 97 % | DIASTOLIC BLOOD PRESSURE: 81 MMHG | HEART RATE: 59 BPM | SYSTOLIC BLOOD PRESSURE: 160 MMHG

## 2018-02-26 VITALS — OXYGEN SATURATION: 95 %

## 2018-02-26 VITALS
DIASTOLIC BLOOD PRESSURE: 76 MMHG | OXYGEN SATURATION: 95 % | HEART RATE: 73 BPM | TEMPERATURE: 97.7 F | SYSTOLIC BLOOD PRESSURE: 125 MMHG

## 2018-02-26 VITALS — OXYGEN SATURATION: 99 %

## 2018-02-26 LAB
BUN SERPL-MCNC: 56 MG/DL (ref 7–18)
BUN SERPL-MCNC: 58 MG/DL (ref 7–18)
CALCIUM SERPL-MCNC: 7.4 MG/DL (ref 8.5–10.1)
CALCIUM SERPL-MCNC: 8 MG/DL (ref 8.5–10.1)
CO2 SERPL-SCNC: 23 MMOL/L (ref 21–32)
CO2 SERPL-SCNC: 27 MMOL/L (ref 21–32)
CREAT SERPL-MCNC: 2.86 MG/DL (ref 0.6–1.4)
CREAT SERPL-MCNC: 3.07 MG/DL (ref 0.6–1.4)
EOSINOPHIL NFR BLD AUTO: 127 K/UL (ref 130–400)
EOSINOPHIL NFR BLD AUTO: 136 K/UL (ref 130–400)
GLUCOSE SERPL-MCNC: 139 MG/DL (ref 70–99)
GLUCOSE SERPL-MCNC: 81 MG/DL (ref 70–99)
HCT VFR BLD CALC: 25.6 % (ref 42–52)
HCT VFR BLD CALC: 27.2 % (ref 42–52)
HGB BLD-MCNC: 7.9 G/DL (ref 14–18)
HGB BLD-MCNC: 8.3 G/DL (ref 14–18)
INR PPP: 1.2 (ref 0.9–1.1)
MCH RBC QN AUTO: 26.1 PG (ref 25–34)
MCH RBC QN AUTO: 26.2 PG (ref 25–34)
MCHC RBC AUTO-ENTMCNC: 30.5 G/DL (ref 32–36)
MCHC RBC AUTO-ENTMCNC: 30.9 G/DL (ref 32–36)
MCV RBC AUTO: 85 FL (ref 80–100)
MCV RBC AUTO: 85.5 FL (ref 80–100)
PMV BLD AUTO: 10.6 FL (ref 7.4–10.4)
PMV BLD AUTO: 10.6 FL (ref 7.4–10.4)
POTASSIUM SERPL-SCNC: 4.6 MMOL/L (ref 3.5–5.1)
POTASSIUM SERPL-SCNC: 5.1 MMOL/L (ref 3.5–5.1)
RED CELL DISTRIBUTION WIDTH CV: 24.8 % (ref 11.5–14.5)
RED CELL DISTRIBUTION WIDTH CV: 25 % (ref 11.5–14.5)
RED CELL DISTRIBUTION WIDTH SD: 74 FL (ref 36.4–46.3)
RED CELL DISTRIBUTION WIDTH SD: 75.1 FL (ref 36.4–46.3)
SODIUM SERPL-SCNC: 138 MMOL/L (ref 136–145)
SODIUM SERPL-SCNC: 140 MMOL/L (ref 136–145)
WBC # BLD AUTO: 19.36 K/UL (ref 4.8–10.8)
WBC # BLD AUTO: 23.6 K/UL (ref 4.8–10.8)

## 2018-02-26 PROCEDURE — 0T778DZ DILATION OF LEFT URETER WITH INTRALUMINAL DEVICE, VIA NATURAL OR ARTIFICIAL OPENING ENDOSCOPIC: ICD-10-PCS | Performed by: UROLOGY

## 2018-02-26 RX ADMIN — RANITIDINE SCH MG: 150 TABLET ORAL at 21:39

## 2018-02-26 RX ADMIN — FERROUS SULFATE TAB EC 325 MG (65 MG FE EQUIVALENT) SCH MG: 325 (65 FE) TABLET DELAYED RESPONSE at 17:30

## 2018-02-26 RX ADMIN — SODIUM CHLORIDE SCH MLS/HR: 900 INJECTION, SOLUTION INTRAVENOUS at 06:14

## 2018-02-26 RX ADMIN — OXYCODONE HYDROCHLORIDE AND ACETAMINOPHEN PRN TAB: 5; 325 TABLET ORAL at 21:48

## 2018-02-26 RX ADMIN — METOPROLOL TARTRATE SCH MG: 25 TABLET, FILM COATED ORAL at 09:14

## 2018-02-26 RX ADMIN — PIPERACILLIN AND TAZOBACTAM SCH MLS/HR: 3; .375 INJECTION, POWDER, LYOPHILIZED, FOR SOLUTION INTRAVENOUS; PARENTERAL at 18:00

## 2018-02-26 RX ADMIN — POTASSIUM CHLORIDE SCH MEQ: 1500 TABLET, EXTENDED RELEASE ORAL at 21:40

## 2018-02-26 RX ADMIN — Medication SCH TAB: at 09:14

## 2018-02-26 RX ADMIN — FERROUS SULFATE TAB EC 325 MG (65 MG FE EQUIVALENT) SCH MG: 325 (65 FE) TABLET DELAYED RESPONSE at 09:16

## 2018-02-26 RX ADMIN — FINASTERIDE SCH MG: 5 TABLET, FILM COATED ORAL at 21:39

## 2018-02-26 RX ADMIN — TAMSULOSIN HYDROCHLORIDE SCH MG: 0.4 CAPSULE ORAL at 09:14

## 2018-02-26 RX ADMIN — POTASSIUM CHLORIDE SCH MEQ: 1500 TABLET, EXTENDED RELEASE ORAL at 09:16

## 2018-02-26 RX ADMIN — PANTOPRAZOLE SCH MG: 40 TABLET, DELAYED RELEASE ORAL at 09:17

## 2018-02-26 RX ADMIN — SODIUM CHLORIDE SCH MLS/HR: 900 INJECTION, SOLUTION INTRAVENOUS at 21:42

## 2018-02-26 RX ADMIN — Medication SCH MG: at 21:38

## 2018-02-26 RX ADMIN — OXYCODONE HYDROCHLORIDE AND ACETAMINOPHEN PRN TAB: 5; 325 TABLET ORAL at 02:18

## 2018-02-26 RX ADMIN — FLUCONAZOLE SCH MLS/HR: 2 INJECTION, SOLUTION INTRAVENOUS at 21:38

## 2018-02-26 RX ADMIN — OXYCODONE HYDROCHLORIDE AND ACETAMINOPHEN PRN TAB: 5; 325 TABLET ORAL at 17:30

## 2018-02-26 RX ADMIN — OXYCODONE HYDROCHLORIDE AND ACETAMINOPHEN PRN TAB: 5; 325 TABLET ORAL at 09:17

## 2018-02-26 RX ADMIN — RANITIDINE SCH MG: 150 TABLET ORAL at 09:15

## 2018-02-26 RX ADMIN — PIPERACILLIN AND TAZOBACTAM SCH MLS/HR: 3; .375 INJECTION, POWDER, LYOPHILIZED, FOR SOLUTION INTRAVENOUS; PARENTERAL at 09:48

## 2018-02-26 RX ADMIN — DAPTOMYCIN SCH MLS/MIN: 50 INJECTION, POWDER, LYOPHILIZED, FOR SOLUTION INTRAVENOUS at 17:30

## 2018-02-26 RX ADMIN — PIPERACILLIN AND TAZOBACTAM SCH MLS/HR: 3; .375 INJECTION, POWDER, LYOPHILIZED, FOR SOLUTION INTRAVENOUS; PARENTERAL at 02:19

## 2018-02-26 RX ADMIN — SODIUM CHLORIDE SCH MLS/HR: 900 INJECTION, SOLUTION INTRAVENOUS at 13:49

## 2018-02-26 RX ADMIN — Medication SCH MG: at 09:15

## 2018-02-26 NOTE — MEDICAL CONSULT
Consultation


Date of Consultation:


Feb 26, 2018.


Attending Physician:


German Woody D.O.


Reason for Consultation:


Gram-positive cocci bacteremia


History of Present Illness


77-year-old male with longstanding rheumatoid arthritis on prednisone therapy, 

BPH, chronic kidney disease, who was hospitalized in January with acute kidney 

injury and obstructive uropathy.  Had Lutz placed because of obstruction from 

enlarged prostate, but apparently patient developed complicated urinary tract 

infection and was given course of IV ceftriaxone.  He was discharged home for 

plans for follow-up for his left hydronephrosis.  Over the days prior to 

admission, patient developed progressively worsening left flank pain associated 

with cloudy urine and fever.  He was admitted to the hospital, found to have 

significant left hydronephrosis on CT scan, read by me, and blood and urine 

cultures now growing Staph aureus, sensitivities are pending.  Patient now 

waiting OR for probable stenting.  Patient has been started on IV Zosyn.





Past Medical/Surgical History


Medical Problems:


(1) Acute renal failure


Status: Acute  





(2) Leukocytosis


Status: Acute  





(3) Urinary tract infection


Status: Acute  








Medical Problems:


(1) BPH (benign prostatic hyperplasia)


(2) Chronic anemia


(3) CVA tenderness


(4) Hydronephrosis


(5) Kidney stones


(6) Pyelonephritis


(7) Renal cyst


(8) Rheumatoid arthritis


(9) Suprapubic pain, acute


Surgical Problems:


(1) H/O inguinal hernia repair


(2) History of left knee replacement


(3) History of right hip replacement


(4) Hx of nasal septoplasty


(5) S/P carpal tunnel release


(6) S/P rotator cuff repair











Family History





FH: CAD (coronary artery disease)


  BROTHER





Social History


Smoking Status:  Former Smoker


Marital Status:  


Housing Status:  lives with family


Occupation Status:  retired





Allergies


Coded Allergies:  


     Lisinopril (Verified  Adverse Reaction, Mild, COUGH, 12/13/17)





Current Inpatient Medications





Current Inpatient Medications








 Medications


  (Trade)  Dose


 Ordered  Sig/Juaquin


 Route  Start Time


 Stop Time Status Last Admin


Dose Admin


 


 Acetaminophen


  (Tylenol Tab)  650 mg  Q4H  PRN


 PO  2/25/18 15:45


 3/27/18 15:44   


 


 


 Al Hydrox/Mg


 Hydrox/Simethicone


  (Maalox Max Susp)  15 ml  Q4H  PRN


 PO  2/25/18 15:45


 3/27/18 15:44   


 


 


 Magnesium


 Hydroxide


  (Milk Of


 Magnesia Susp)  30 ml  Q6H  PRN


 PO  2/25/18 15:45


 3/27/18 15:44   


 


 


 Polyethylene


  (Miralax Powder


 Packet)  17 gm  DAILY  PRN


 PO  2/25/18 15:45


 3/27/18 15:44   


 


 


 Ondansetron HCl


  (Zofran Inj)  4 mg  Q6H  PRN


 IV  2/25/18 15:45


 3/27/18 15:44   


 


 


 Aspirin


  (Ecotrin Tab)  81 mg  BID


 PO  2/25/18 20:00


 3/27/18 20:59  2/26/18 09:15


81 MG


 


 Finasteride


  (Proscar Tab)  5 mg  HS


 PO  2/25/18 21:00


 3/27/18 20:59  2/25/18 19:50


5 MG


 


 Folic Acid


  (Folvite Tab)  1 mg  QAM


 PO  2/26/18 08:00


 3/28/18 08:59  2/26/18 09:16


1 MG


 


 Metoprolol


 Tartrate


  (Lopressor Tab)  25 mg  DAILY


 PO  2/26/18 08:00


 3/28/18 08:59  2/26/18 09:14


25 MG


 


 Multivitamins


  (Multivitamin


 Tab)  1 tab  QAM


 PO  2/26/18 08:00


 3/28/18 08:59  2/26/18 09:14


1 TAB


 


 Potassium Chloride


  (Klor-Con Tab)  20 meq  BID


 PO  2/25/18 20:00


 3/27/18 20:59  2/26/18 09:16


20 MEQ


 


 Ranitidine HCl


  (zANTac TAB)  150 mg  BID


 PO  2/25/18 20:00


 3/27/18 20:59  2/26/18 09:15


150 MG


 


 Tamsulosin HCl


  (Flomax Cap)  0.4 mg  DAILY


 PO  2/26/18 08:00


 3/28/18 08:59  2/26/18 09:14


0.4 MG


 


 Ferrous Sulfate


  (Feosol Tab)  325 mg  BIDM


 PO  2/25/18 17:00


 3/27/18 17:59  2/26/18 09:16


325 MG


 


 Pantoprazole


 Sodium


  (Protonix Tab)  40 mg  QAM


 PO  2/26/18 08:00


 3/28/18 08:59  2/26/18 09:17


40 MG


 


 Sodium Chloride  1,000 ml @ 


 100 mls/hr  Q10H


 IV  2/25/18 16:00


 3/27/18 15:59  2/26/18 13:49


100 MLS/HR


 


 Oxycodone/


 Acetaminophen


  (Percocet


 5-325mg Tab)  1 tab  Q4H  PRN


 PO  2/25/18 15:45


 3/11/18 15:44  2/26/18 09:17


1 TAB


 


 Miscellaneous


 Information


  (Consult)  1 ea  UD  PRN


 N/A  2/25/18 19:00


 3/27/18 18:59   


 


 


 Fluconazole/


 Sodium Chloride


 100 mg/Prmx  50 ml @ 


 100 mls/hr  Q24H


 IV  2/25/18 20:00


 3/7/18 19:59  2/25/18 20:27


100 MLS/HR


 


 Piperacillin Sod/


 Tazobactam Sod


 3.375 gm/Dextrose  115 ml @ 


 28.75 mls/


 hr  Q8H


 IV  2/26/18 02:00


 3/7/18 01:59  2/26/18 09:48


28.75 MLS/HR


 


 Prednisone


  (PredniSONE TAB)  20 mg  DAILY


 PO  2/27/18 08:00


 3/28/18 08:59   


 


 


 Fentanyl Citrate


  (Fentanyl Inj)  50 mcg  Q5M  PRN


 IV  2/26/18 15:15


 2/26/18 20:00   


 


 


 Ondansetron HCl


  (Zofran Inj)  4 mg  ONE  PRN


 IV  2/26/18 15:15


     


 


 


 Promethazine HCl


 6.25 mg/Sodium


 Chloride  50.25 ml @ 


 202 mls/hr  ONE  PRN


 IV  2/26/18 15:15


     


 


 


 Ephedrine Sulfate


  (EpHEDrine


 SULFATE INJ)  5 mg  Q5M  PRN


 IV  2/26/18 15:15


 2/26/18 20:00   


 


 


 Atropine Sulfate


  (Atropine


 Sulfate 0.1mg/ml


 Inj)  0.5 mg  Q1M  PRN


 IV  2/26/18 15:15


 2/26/18 20:00   


 











Review of Systems


Constitutional:  + weakness, No fever


Eyes:  No problem reported


ENT:  No problem reported


Respiratory:  No problem reported


Cardiovascular:  No problem reported


Abdomen:  + pain


Genitourinary - Male:  + problem reported (Indwelling Lutz with cloudy urine, 

left flank pain)


Neurologic:  No problem reported


Psychiatric:  No problem reported


Endocrine:  No problem reported


Hematologic / Lymphatic:  No problem reported


Integumentary:  No problem reported


Allergic / Immunologic:  No problem reported





Physical Exam











  Date Time  Temp Pulse Resp B/P (MAP) Pulse Ox O2 Delivery O2 Flow Rate FiO2


 


2/26/18 14:41 36.5 58 18 106/65 (79) 95 Room Air  


 


2/26/18 14:34 36.4 62 20 110/64 (79) 95 Room Air  


 


2/26/18 09:15      Room Air  


 


2/26/18 08:27     95 Room Air  


 


2/26/18 07:39 36.7 89 19 110/64 (79) 96 Room Air  


 


2/26/18 00:40      Room Air  


 


2/25/18 23:10 36.7 98 21 105/61 (76) 98 Room Air  


 


2/25/18 19:32     95 Room Air  


 


2/25/18 17:03 36.8 96 19 154/88 (110) 95 Room Air  


 


2/25/18 16:37  89 17 151/87 95 Room Air  


 


2/25/18 15:44     95 Room Air  


 


2/25/18 15:30  90 16 157/91 95 Room Air  








General Appearance:  WD/WN, no apparent distress


Head:  normocephalic, atraumatic


Eyes:  normal inspection, EOMI, sclerae normal


ENT:  normal ENT inspection, hearing grossly normal, pharynx normal


Neck:  supple, no adenopathy, thyroid normal, trachea midline


Respiratory/Chest:  chest non-tender, lungs clear, normal breath sounds, no 

respiratory distress


Cardiovascular:  regular rate, rhythm, no gallop, no murmur


Abdomen/GI:  normal bowel sounds, non tender, soft, no organomegaly


Back:  normal inspection, + left CVA tenderness


Extremities/Musculoskelatal:  normal inspection, no calf tenderness, normal 

capillary refill, non-tender


Neurologic/Psych:  alert, oriented x 3


Skin:  normal color, warm/dry, no rash


Lymphatic:  no adenopathy





Laboratory Results











 Date/Time


Source Procedure


Growth Status


 


 


 2/25/18 15:39


Blood Blood Culture - Preliminary


Gram Positive Cocci Resulted


 


 2/25/18 15:31


Blood Blood Culture - Preliminary


Gram Positive Cocci Resulted








Last 24 Hours








Test


  2/25/18


15:31 2/25/18


18:48 2/26/18


05:50 2/26/18


11:30


 


Lactic Acid Level 2.4 mmol/L  1.9 mmol/L   


 


Sodium Level  139 mmol/L  140 mmol/L  


 


Potassium Level  4.6 mmol/L  4.6 mmol/L  


 


Chloride Level  103 mmol/L  106 mmol/L  


 


Carbon Dioxide Level  30 mmol/L  27 mmol/L  


 


Anion Gap  6.0 mmol/L  7.0 mmol/L  


 


Blood Urea Nitrogen  53 mg/dl  56 mg/dl  


 


Creatinine  2.47 mg/dl  2.86 mg/dl  


 


Est Creatinine Clear Calc


Drug Dose 


  21.8 ml/min 


  18.8 ml/min 


  


 


 


Estimated GFR (


American) 


  28.1 


  23.5 


  


 


 


Estimated GFR (Non-


American 


  24.2 


  20.3 


  


 


 


BUN/Creatinine Ratio  21.3  19.6  


 


Random Glucose  91 mg/dl  81 mg/dl  


 


Calcium Level  8.6 mg/dl  7.4 mg/dl  


 


White Blood Count   23.60 K/uL  


 


Red Blood Count   3.01 M/uL  


 


Hemoglobin   7.9 g/dL  


 


Hematocrit   25.6 %  


 


Mean Corpuscular Volume   85.0 fL  


 


Mean Corpuscular Hemoglobin   26.2 pg  


 


Mean Corpuscular Hemoglobin


Concent 


  


  30.9 g/dl 


  


 


 


RDW Standard Deviation   74.0 fL  


 


RDW Coefficient of Variation   24.8 %  


 


Platelet Count   136 K/uL  


 


Mean Platelet Volume   10.6 fL  


 


Prothrombin Time    12.5 SECONDS 


 


Prothromb Time International


Ratio 


  


  


  1.2 


 


 


Test


  2/26/18


15:00 


  


  


 








CT OF THE ABDOMEN AND PELVIS WITHOUT CONTRAST, STONE PROTOCOL





CLINICAL HISTORY: Left flank pain.    





COMPARISON STUDY:  None.





TECHNIQUE: Helical axial images of the abdomen and pelvis were obtained without


IV or oral contrast according to renal stone protocol.  A dose lowering


technique was utilized adhering to the principles of ALARA.





FINDINGS: Unenhanced images of the liver, spleen, adrenal glands are


unremarkable with exception of calcified granulomas within the spleen. There is


moderate dilatation of the pancreatic duct to the level the ampulla, measuring 6


mm in caliber. There is no biliary ductal dilatation. There is no pancreatic


glandular atrophy. No pancreatic mass is identified although sensitivity is


diminished on this unenhanced exam. There is no pneumatosis, free air or portal


venous gas. There is no evidence for a bowel obstruction. There is left colon


diverticulosis without evidence for acute diverticulitis. The appendix is


normal. There is no lymphadenopathy. There are numerous bilateral renal lesions.


These are suboptimally assessed on this exam but the majority measure water


attenuation and likely reflect cysts. There is a 1.5 cm hypodense lesion within


the midpole of the right kidney. There is a complex 4 cm cystic lesion within


the midpole of the left kidney which contains thin septations with thin


calcification. There is moderate left hydroureteronephrosis with moderate


perinephric infiltration. The left ureter is dilated to the level of the distal


left ureter. As a 3 mm calculus within the left lateral aspect of the bladder.


There is marked enlargement of the prostate. The prostate indents the base of


the bladder. A Lutz balloon is present within the bladder. Evaluation of the


bladder suboptimal given lack of contrast as well as streak artifact from right


hip arthroplasty. There are punctate bilateral renal calculi. There are no


suspicious osseous lesions.





IMPRESSION:  





1. Moderate left hydroureteronephrosis with dilatation of the left ureter to the


level the distal ureter. No ureteral calculi. Moderate left perinephric


infiltration. Differential considerations for the dilatation include a recently


passed calculus, occult distal left ureteral/bladder urothelial lesion or


infectious process. Urologic consultation might be considered. A short-term


follow-up CT could be obtained to evaluate for resolution of the left


hydroureteronephrosis. No right hydronephrosis. Marked enlargement of the


prostate.





2. Punctate bilateral nephrolithiasis.





3. Numerous bilateral renal lesions are suboptimally assessed on this exam but


likely reflect cysts. 4 cm cystic left renal lesion contains thin septations


within calcification. This is probably benign but a follow-up nonemergent renal


protocol CT or MRI is recommended. 1.5 cm hypodense right renal lesion which


favors a hyperdense cyst which can be assessed on subsequent study.











Electronically signed by:  Devon Casiano M.D.


2/25/2018 4:41 PM





Dictated Date/Time:  2/25/2018 4:28 PM





 The status of this report is Signed.   


 Draft = Not yet reviewed or approved by Radiologist.  


 Signed = Reviewed and approved by Radiologist.


<AttendingPhy></AttendingPhy> <FamilyPhy>Arnaldo Trinidad M.D.</FamilyPhy> <

PrimaryPhy>Arnaldo Trinidad M.D.</PrimaryPhy> <UnitNumber>[id number]</UnitNumber> <

VisitNumber>[id number]</VisitNumber> <PatientName>DARRYL CARRILLO</PatientName> 

<DateOfBirth>1940</DateOfBirth> <Location>[facility]</Location> <ServiceDate>02 /25/18</ServiceDate> <MNE>ESINDI</MNE> <OrderingPhy>Omar Issa D.O.</

OrderingPhy> <OrderingPhyMNE>f rep ord dr tavera</OrderingPhyMNE> <DictatingPhyMNE>

f rep dict dr tavera</DictatingPhyMNE> <CCListMNE>f rep





Assessment & Plan


Gram-positive bacteremia with positive urine culture for Staph aureus in the 

setting of obstructive uropathy and left hydronephrosis.  Patient awaiting 

urologic intervention with probable stenting.  Will add IV daptomycin until 

final blood culture identification and sensitivities available.  Length of IV 

antibiotics yet to be determined.  Will follow.

## 2018-02-26 NOTE — PROGRESS NOTE
Subjective


Date of Service:


Feb 26, 2018.


 (Jackie Mack CRNP)





Subjective


Pt evaluation today including:  conversation w/ patient, chart review, lab 

review


Voiding:  alex catheter in place (patent, draining clear, yellow urine)


78 yo male with left hydro, sepsis. 


Pt continues to have back pain this morning. 


White count increased slightly to 23.60 this morning. 


Cr trending up to 2.86. 


Blood cultures growing gram positive bacilli. UC&S growing staph aureus. 


 (Jackie Mack CRNP)





Problem List


Medical Problems:


(1) Acute renal failure


Status: Acute  





(2) Leukocytosis


Status: Acute  





(3) Urinary tract infection


Status: Acute  





 (Jackie Mack CRNP)





Review of Systems


Constitutional:  No fever, No chills


Respiratory:  No shortness of breath


Cardiac:  No chest pain


Abdomen:  + pain (suprapubic pain ), No nausea, No vomiting


Musculoskeletal:  + problem reported (low back pain )


Heme:  No abnormal bleeding/bruising (Jackie Mack CRNP)





Objective


Vital Signs











  Date Time  Temp Pulse Resp B/P (MAP) Pulse Ox O2 Delivery O2 Flow Rate FiO2


 


2/26/18 08:27     95 Room Air  


 


2/26/18 07:39 36.7 89 19 110/64 (79) 96 Room Air  


 


2/26/18 00:40      Room Air  


 


2/25/18 23:10 36.7 98 21 105/61 (76) 98 Room Air  


 


2/25/18 19:32     95 Room Air  


 


2/25/18 17:03 36.8 96 19 154/88 (110) 95 Room Air  


 


2/25/18 16:37  89 17 151/87 95 Room Air  


 


2/25/18 15:44     95 Room Air  


 


2/25/18 15:30  90 16 157/91 95 Room Air  


 


2/25/18 13:43  78 18 161/62 95 Room Air  


 


2/25/18 11:47 36.6 74 20 183/88 98 Room Air  








 (Jackie Mack CRNP)





Physical Exam


General Appearance:  no apparent distress


Eyes:  normal inspection


ENT:  hearing grossly normal


Neck:  no JVD


Respiratory/Chest:  no respiratory distress, no accessory muscle use


Cardiovascular:  no JVD


Extremities:  normal inspection


Neurologic/Psychiatric:  alert, normal mood/affect, oriented x 3


Skin:  normal color


 (Jackie Mack CRNP)





Laboratory Results





Last 24 Hours








Test


  2/25/18


13:15 2/25/18


14:08 2/25/18


15:31 2/25/18


18:48


 


White Blood Count 20.91 K/uL    


 


Red Blood Count 3.81 M/uL    


 


Hemoglobin 10.1 g/dL    


 


Hematocrit 32.1 %    


 


Mean Corpuscular Volume 84.3 fL    


 


Mean Corpuscular Hemoglobin 26.5 pg    


 


Mean Corpuscular Hemoglobin


Concent 31.5 g/dl 


  


  


  


 


 


Platelet Count 182 K/uL    


 


Mean Platelet Volume 10.3 fL    


 


Neutrophils (%) (Auto) 92.4 %    


 


Lymphocytes (%) (Auto) 2.7 %    


 


Monocytes (%) (Auto) 4.4 %    


 


Eosinophils (%) (Auto) 0.1 %    


 


Basophils (%) (Auto) 0.0 %    


 


Neutrophils # (Auto) 19.33 K/uL    


 


Lymphocytes # (Auto) 0.56 K/uL    


 


Monocytes # (Auto) 0.91 K/uL    


 


Eosinophils # (Auto) 0.02 K/uL    


 


Basophils # (Auto) 0.00 K/uL    


 


RDW Standard Deviation 70.0 fL    


 


RDW Coefficient of Variation 23.5 %    


 


Immature Granulocyte % (Auto) 0.4 %    


 


Immature Granulocyte # (Auto) 0.09 K/uL    


 


Anisocytosis PRESENT    


 


Sodium Level 139 mmol/L    139 mmol/L 


 


Potassium Level 4.2 mmol/L    4.6 mmol/L 


 


Chloride Level 103 mmol/L    103 mmol/L 


 


Carbon Dioxide Level 29 mmol/L    30 mmol/L 


 


Anion Gap 7.0 mmol/L    6.0 mmol/L 


 


Blood Urea Nitrogen 49 mg/dl    53 mg/dl 


 


Creatinine 2.11 mg/dl    2.47 mg/dl 


 


Est Creatinine Clear Calc


Drug Dose 25.5 ml/min 


  


  


  21.8 ml/min 


 


 


Estimated GFR (


American) 34.0 


  


  


  28.1 


 


 


Estimated GFR (Non-


American 29.3 


  


  


  24.2 


 


 


BUN/Creatinine Ratio 23.4    21.3 


 


Random Glucose 90 mg/dl    91 mg/dl 


 


Calcium Level 9.0 mg/dl    8.6 mg/dl 


 


Urine Color  DK YELLOW   


 


Urine Appearance  CLOUDY   


 


Urine pH  8.5   


 


Urine Specific Gravity  1.016   


 


Urine Protein  1+   


 


Urine Glucose (UA)  NEG   


 


Urine Ketones  NEG   


 


Urine Occult Blood  3+   


 


Urine Nitrite  POS   


 


Urine Bilirubin  NEG   


 


Urine Urobilinogen  NEG   


 


Urine Leukocyte Esterase  LARGE   


 


Urine WBC (Auto)  >30 /hpf   


 


Urine RBC (Auto)  >30 /hpf   


 


Urine Hyaline Casts (Auto)  1-5 /lpf   


 


Urine Epithelial Cells (Auto)  0-5 /lpf   


 


Urine Bacteria (Auto)  1+   


 


Lactic Acid Level   2.4 mmol/L  1.9 mmol/L 


 


Test


  2/26/18


05:50 


  


  


 


 


White Blood Count 23.60 K/uL    


 


Red Blood Count 3.01 M/uL    


 


Hemoglobin 7.9 g/dL    


 


Hematocrit 25.6 %    


 


Mean Corpuscular Volume 85.0 fL    


 


Mean Corpuscular Hemoglobin 26.2 pg    


 


Mean Corpuscular Hemoglobin


Concent 30.9 g/dl 


  


  


  


 


 


RDW Standard Deviation 74.0 fL    


 


RDW Coefficient of Variation 24.8 %    


 


Platelet Count 136 K/uL    


 


Mean Platelet Volume 10.6 fL    


 


Sodium Level 140 mmol/L    


 


Potassium Level 4.6 mmol/L    


 


Chloride Level 106 mmol/L    


 


Carbon Dioxide Level 27 mmol/L    


 


Anion Gap 7.0 mmol/L    


 


Blood Urea Nitrogen 56 mg/dl    


 


Creatinine 2.86 mg/dl    


 


Est Creatinine Clear Calc


Drug Dose 18.8 ml/min 


  


  


  


 


 


Estimated GFR (


American) 23.5 


  


  


  


 


 


Estimated GFR (Non-


American 20.3 


  


  


  


 


 


BUN/Creatinine Ratio 19.6    


 


Random Glucose 81 mg/dl    


 


Calcium Level 7.4 mg/dl    








 (Jackie Mack CRNP)





Assessment and Plan


A/P: Left hydronephrosis, BPH with obstruction, sepsis, suspected pyelonephritis





AFVSS. 


Pt with persistent pain; rising Cr. Suspect pyelonephritis. 


Discussed with Dr. Cm this morning, will plan for cysto with left 

ureteral stent placement today. Risks and benefits of the procedure discussed 

with the pt. All questions answered. 


Will order a pre-op chest x-ray and EKG. 


Continue IV abx and fluconazole pending culture sensitivities. Would then 

transition to 4-6 weeks of oral abx such as Cipro or Bactrim DS to tx for 

suspected prostatitis. 


Pt scheduled to f/u with Dr. Segura this Friday 3-2-18. Will keep as scheduled 

to discuss options for management of BPH. 


Will continue to follow along with primary service. 


 (Jackie Mack CRNP)


Urinary retention; left hydro; leukocytosis; infection





- discussed the findings


- recommended cysto and left ureteral stent placement


- will eval prostate during the scope for future interventions


 (Sukhjinder Cm M.D.)

## 2018-02-26 NOTE — DIAGNOSTIC IMAGING REPORT
RETROGRADE INCLUDES KUB



CLINICAL HISTORY: LEFT SIDE STENT PLACEMENT    



COMPARISON STUDY:  CT scan dated 2/25/2018



FINDINGS: 4 intraoperative fluoroscopic spot images are provided for

interpretation. 116 seconds of fluoroscopic time was utilized.



Images demonstrate retrograde catheterization of the left ureter. There is

left-sided hydronephrosis. The final image demonstrates the proximal pigtail of

a left-sided nephroureteral stent



IMPRESSION:  Intraprocedural radiographs demonstrating left-sided hydronephrosis

and placement of a left-sided nephroureteral stent 









Electronically signed by:  Edgar Coleman M.D.

2/26/2018 4:36 PM



Dictated Date/Time:  2/26/2018 4:34 PM

## 2018-02-26 NOTE — NEPHROLOGY CONSULTATION
Nephrology Consultation


Date & Providers





Date of Consultation:  Feb 26, 2018.





Primary Care Provider:  Arnaldo Trinidad M.D.





Referring Provider:





Reason for Consultation


LINDA





History of Present Illness


Mr. Santos is a 77 year old white male who is seen at the request of Dr. Mitchell for evaluation of LINDA.  Medical records in the hospital EMR were 

reviewed and are summarized as follows:  Mr. Santos resides in [place], [place].  

His PCP is Dr. Trinidad.  Mr. Santos has had two recent hospitalizations at 

[facility] and has requested admission to [facility] with the [facility] for 

evaluation of LINDA, BPH and L hydronephrosis.  Mr. Santos notes that he may have 

passed a kidney stone several years ago.  He did not require hospitalization or 

further outpatient evaluation at that time.  He has no CKD that he is aware of.

  Medical records indicate that his baseline creatinine was ~ 1.0 in 12/17.  

Mr. Santos was hospitalized 01/18 at [facility] w/ diarrhea and LINDA.  

Creatinine was 4.1 on admission but improved to 2.6 with IV hydration.  

Abdominal CT performed at that time did reveal L hydronephrosis and BPH but no 

kidney stone was seen.  A alex catheter was placed and Mr. Santos was scheduled 

for outpatient follow up w/ Dr. Garza and [facility].  On 02/02/18 Mr. Santos was readmitted to [facility] for evaluation of worsening kidney 

function.  Urine microscopy was positive for eosinophils and patient's 

Prednisone dose was increased from 5 mg to 40 mg daily for treatment of 

potential AIN.  IVF was administered.  Again creatinine improved to 2.4 and Mr. Santos was discharged from the hospital.  On 02/16 Mr. Santos was seen by his PCP.  

He was experiencing low grade fever and L flank pain.  Creatinine was 

relatively stable at 2.6.  Dr. Trinidad advised hospital evaluation for probable 

pyelonephritis.  Mr. Santos requested hospitalization at [facility] and care provided 

by [facility].  





This morning Mr. Santos is resting comfortably flat in bed on room air.  He 

currently denies fever, flank pain or diarrhea.  He is receiving broad spectrum 

antibiotic therapy.  Contrast negative abdominal CT films were reviewed this 

morning - bilateral punctate kidney stones, bilateral kidney cysts, L lower 

pole kidney cyst appears complex, moderate L hydronephrosis, 3 mm stone within 

the L side of the bladder.  Urine culture is positive for staph aureus.  

Sensitivities are pending.  Case discussed w/ Urology this morning - patient is 

scheduled for cystoscopy w/ L ureteral stent insertion this afternoon.





Past Medical/Surgical History


Medical:


#  Baseline creatinine 0.9 12/17


#  BPH


#  Kidney stones


#  HTN


#  Iron deficiency anemia


#  Chronic GERD


#  Chronic constipation


#  Remote h/o tobacco use


#  Arthritis managed w/ Leflunomide and Prednisone therapy


Surgical:


#  Hip & knee replacement








Allergies


Coded Allergies:  


     Lisinopril (Verified  Adverse Reaction, Mild, COUGH, 12/13/17)





Inpatient Medications





Current Inpatient Medications








 Medications


  (Trade)  Dose


 Ordered  Sig/Juaquin


 Route  Start Time


 Stop Time Status Last Admin


Dose Admin


 


 Acetaminophen


  (Tylenol Tab)  650 mg  Q4H  PRN


 PO  2/25/18 15:45


 3/27/18 15:44   


 


 


 Al Hydrox/Mg


 Hydrox/Simethicone


  (Maalox Max Susp)  15 ml  Q4H  PRN


 PO  2/25/18 15:45


 3/27/18 15:44   


 


 


 Magnesium


 Hydroxide


  (Milk Of


 Magnesia Susp)  30 ml  Q6H  PRN


 PO  2/25/18 15:45


 3/27/18 15:44   


 


 


 Polyethylene


  (Miralax Powder


 Packet)  17 gm  DAILY  PRN


 PO  2/25/18 15:45


 3/27/18 15:44   


 


 


 Ondansetron HCl


  (Zofran Inj)  4 mg  Q6H  PRN


 IV  2/25/18 15:45


 3/27/18 15:44   


 


 


 Aspirin


  (Ecotrin Tab)  81 mg  BID


 PO  2/25/18 20:00


 3/27/18 20:59  2/26/18 09:15


81 MG


 


 Finasteride


  (Proscar Tab)  5 mg  HS


 PO  2/25/18 21:00


 3/27/18 20:59  2/25/18 19:50


5 MG


 


 Folic Acid


  (Folvite Tab)  1 mg  QAM


 PO  2/26/18 08:00


 3/28/18 08:59  2/26/18 09:16


1 MG


 


 Metoprolol


 Tartrate


  (Lopressor Tab)  25 mg  DAILY


 PO  2/26/18 08:00


 3/28/18 08:59  2/26/18 09:14


25 MG


 


 Multivitamins


  (Multivitamin


 Tab)  1 tab  QAM


 PO  2/26/18 08:00


 3/28/18 08:59  2/26/18 09:14


1 TAB


 


 Potassium Chloride


  (Klor-Con Tab)  20 meq  BID


 PO  2/25/18 20:00


 3/27/18 20:59  2/26/18 09:16


20 MEQ


 


 Prednisone


  (PredniSONE TAB)  40 mg  DAILY


 PO  2/26/18 08:00


 3/28/18 08:59  2/26/18 09:15


40 MG


 


 Ranitidine HCl


  (zANTac TAB)  150 mg  BID


 PO  2/25/18 20:00


 3/27/18 20:59  2/26/18 09:15


150 MG


 


 Tamsulosin HCl


  (Flomax Cap)  0.4 mg  DAILY


 PO  2/26/18 08:00


 3/28/18 08:59  2/26/18 09:14


0.4 MG


 


 Ferrous Sulfate


  (Feosol Tab)  325 mg  BIDM


 PO  2/25/18 17:00


 3/27/18 17:59  2/26/18 09:16


325 MG


 


 Pantoprazole


 Sodium


  (Protonix Tab)  40 mg  QAM


 PO  2/26/18 08:00


 3/28/18 08:59  2/26/18 09:17


40 MG


 


 Sodium Chloride  1,000 ml @ 


 100 mls/hr  Q10H


 IV  2/25/18 16:00


 3/27/18 15:59  2/26/18 06:14


100 MLS/HR


 


 Oxycodone/


 Acetaminophen


  (Percocet


 5-325mg Tab)  1 tab  Q4H  PRN


 PO  2/25/18 15:45


 3/11/18 15:44  2/26/18 09:17


1 TAB


 


 Miscellaneous


 Information


  (Consult)  1 ea  UD  PRN


 N/A  2/25/18 19:00


 3/27/18 18:59   


 


 


 Fluconazole/


 Sodium Chloride


 100 mg/Prmx  50 ml @ 


 100 mls/hr  Q24H


 IV  2/25/18 20:00


 3/7/18 19:59  2/25/18 20:27


100 MLS/HR


 


 Piperacillin Sod/


 Tazobactam Sod


 3.375 gm/Dextrose  115 ml @ 


 28.75 mls/


 hr  Q8H


 IV  2/26/18 02:00


 3/7/18 01:59  2/26/18 09:48


28.75 MLS/HR











Family History





FH: CAD (coronary artery disease)


  BROTHER





Negative for CKD / ESRD





Social History


Smoking Status:  Former Smoker


Marital Status:  


Housing Status:  lives with family


Occupation:  retired





.  Retired.  Formerly worked at [employer], later owned a 

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





Review of Systems


Constitutional:  No fever


Respiratory:  No cough


Cardiovascular:  No chest pain


Abdomen:  No pain, No nausea, No vomiting, No diarrhea


A complete review of systems was performed.  Pertinent positives are noted 

above. All other systems are negative.





Physical Exam











  Date Time  Temp Pulse Resp B/P (MAP) Pulse Ox O2 Delivery O2 Flow Rate FiO2


 


2/26/18 09:15      Room Air  


 


2/26/18 08:27     95 Room Air  


 


2/26/18 07:39 36.7 89 19 110/64 (79) 96 Room Air  


 


2/26/18 00:40      Room Air  


 


2/25/18 23:10 36.7 98 21 105/61 (76) 98 Room Air  


 


2/25/18 19:32     95 Room Air  


 


2/25/18 17:03 36.8 96 19 154/88 (110) 95 Room Air  


 


2/25/18 16:37  89 17 151/87 95 Room Air  


 


2/25/18 15:44     95 Room Air  


 


2/25/18 15:30  90 16 157/91 95 Room Air  


 


2/25/18 13:43  78 18 161/62 95 Room Air  


 


2/25/18 11:47 36.6 74 20 183/88 98 Room Air  








General Appearance:  no apparent distress


Head:  normocephalic, atraumatic


Eyes:  PERRL, EOMI


Neck:  no adenopathy, no JVD


Respiratory/Chest:  lungs clear, no respiratory distress


Cardiovascular:  regular rate, rhythm


Abdomen/GI:  normal bowel sounds, non tender, soft


Back:  + left CVA tenderness


Extremities/Musculoskelatal:  no calf tenderness, no pedal edema


Neurologic/Psych:  alert, oriented x 3


Skin:  warm/dry





Laboratory Results





Last 24 Hours








Test


  2/25/18


13:15 2/25/18


14:08 2/25/18


15:31 2/25/18


18:48


 


White Blood Count 20.91 K/uL    


 


Red Blood Count 3.81 M/uL    


 


Hemoglobin 10.1 g/dL    


 


Hematocrit 32.1 %    


 


Mean Corpuscular Volume 84.3 fL    


 


Mean Corpuscular Hemoglobin 26.5 pg    


 


Mean Corpuscular Hemoglobin


Concent 31.5 g/dl 


  


  


  


 


 


Platelet Count 182 K/uL    


 


Mean Platelet Volume 10.3 fL    


 


Neutrophils (%) (Auto) 92.4 %    


 


Lymphocytes (%) (Auto) 2.7 %    


 


Monocytes (%) (Auto) 4.4 %    


 


Eosinophils (%) (Auto) 0.1 %    


 


Basophils (%) (Auto) 0.0 %    


 


Neutrophils # (Auto) 19.33 K/uL    


 


Lymphocytes # (Auto) 0.56 K/uL    


 


Monocytes # (Auto) 0.91 K/uL    


 


Eosinophils # (Auto) 0.02 K/uL    


 


Basophils # (Auto) 0.00 K/uL    


 


RDW Standard Deviation 70.0 fL    


 


RDW Coefficient of Variation 23.5 %    


 


Immature Granulocyte % (Auto) 0.4 %    


 


Immature Granulocyte # (Auto) 0.09 K/uL    


 


Anisocytosis PRESENT    


 


Sodium Level 139 mmol/L    139 mmol/L 


 


Potassium Level 4.2 mmol/L    4.6 mmol/L 


 


Chloride Level 103 mmol/L    103 mmol/L 


 


Carbon Dioxide Level 29 mmol/L    30 mmol/L 


 


Anion Gap 7.0 mmol/L    6.0 mmol/L 


 


Blood Urea Nitrogen 49 mg/dl    53 mg/dl 


 


Creatinine 2.11 mg/dl    2.47 mg/dl 


 


Est Creatinine Clear Calc


Drug Dose 25.5 ml/min 


  


  


  21.8 ml/min 


 


 


Estimated GFR (


American) 34.0 


  


  


  28.1 


 


 


Estimated GFR (Non-


American 29.3 


  


  


  24.2 


 


 


BUN/Creatinine Ratio 23.4    21.3 


 


Random Glucose 90 mg/dl    91 mg/dl 


 


Calcium Level 9.0 mg/dl    8.6 mg/dl 


 


Urine Color  DK YELLOW   


 


Urine Appearance  CLOUDY   


 


Urine pH  8.5   


 


Urine Specific Gravity  1.016   


 


Urine Protein  1+   


 


Urine Glucose (UA)  NEG   


 


Urine Ketones  NEG   


 


Urine Occult Blood  3+   


 


Urine Nitrite  POS   


 


Urine Bilirubin  NEG   


 


Urine Urobilinogen  NEG   


 


Urine Leukocyte Esterase  LARGE   


 


Urine WBC (Auto)  >30 /hpf   


 


Urine RBC (Auto)  >30 /hpf   


 


Urine Hyaline Casts (Auto)  1-5 /lpf   


 


Urine Epithelial Cells (Auto)  0-5 /lpf   


 


Urine Bacteria (Auto)  1+   


 


Lactic Acid Level   2.4 mmol/L  1.9 mmol/L 


 


Test


  2/26/18


05:50 


  


  


 


 


White Blood Count 23.60 K/uL    


 


Red Blood Count 3.01 M/uL    


 


Hemoglobin 7.9 g/dL    


 


Hematocrit 25.6 %    


 


Mean Corpuscular Volume 85.0 fL    


 


Mean Corpuscular Hemoglobin 26.2 pg    


 


Mean Corpuscular Hemoglobin


Concent 30.9 g/dl 


  


  


  


 


 


RDW Standard Deviation 74.0 fL    


 


RDW Coefficient of Variation 24.8 %    


 


Platelet Count 136 K/uL    


 


Mean Platelet Volume 10.6 fL    


 


Sodium Level 140 mmol/L    


 


Potassium Level 4.6 mmol/L    


 


Chloride Level 106 mmol/L    


 


Carbon Dioxide Level 27 mmol/L    


 


Anion Gap 7.0 mmol/L    


 


Blood Urea Nitrogen 56 mg/dl    


 


Creatinine 2.86 mg/dl    


 


Est Creatinine Clear Calc


Drug Dose 18.8 ml/min 


  


  


  


 


 


Estimated GFR (


American) 23.5 


  


  


  


 


 


Estimated GFR (Non-


American 20.3 


  


  


  


 


 


BUN/Creatinine Ratio 19.6    


 


Random Glucose 81 mg/dl    


 


Calcium Level 7.4 mg/dl    











Impression





(1) Acute renal failure


(2) Hydronephrosis


(3) Kidney stones


(4) Pyelonephritis


(5) BPH (benign prostatic hyperplasia)


(6) Renal cyst


(7) Chronic anemia





Recommendations


ACUTE KIDNEY INJURY:


-- Agree w/ cystoscopy w/ stenting to assess for and alleviate any obstruction 

of the L ureter


-- Will monitor serial PRP





ANEMIA:


-- Will check INR and order type & screen prior to Urologic procedure this 

afternoon


-- Recommend transfusion if Hgb drops below 7.5 or if patient becomes 

symptomatic





ID:


-- Preliminary culture is positive for Staph aureus.  


-- Recommend reducing Zosyn to 2.25 g IV q 8 hours due to EGFR 18 - 20 cc/min


-- Will consult Pharmacy to assist w/ antibiotic dosing


-- Await sensitivities.  Patient may require Vancomycin





RHEUM:


-- Patient has a h/o RA treated w/ Leflunomide and Prednisone therapy


-- Recommend tapering Prednisone therapy to 5 - 10 mg per day





KIDNEY STONES:


-- Will benefit from outpatient metabolic stone evaluation once medically stable





KIDNEY CYSTS:


-- L kidney has complex cyst in the lower pole.  Patient will likely require 

contrast + abdominal CT or MRI once kidney function recovers

## 2018-02-26 NOTE — [facility] OPERATIVE REPORT
Operative Report


Operative Date


Feb 26, 2018.





Pre-Operative Diagnosis





Hydronephrosis, Urinary Retention





Post-Operative Diagnosis





Hydronephrosis, Urinary Retention





Procedure(s) Performed





Cystoscopy, Left Ureteral Stent Insertion ([id number])





Surgeon


Dr. Cm





Assistant Surgeon(s)


none





Estimated Blood Loss


0 ml





Findings


grossly purulent urine in the left kidney; severe J-hooking of the ureter





Specimens





none per surgoen





Drains


20F alex; [id number] stent





Anesthesia Type


MAC





Complication(s)


none





Disposition


no


Recovery Room / PACU





Indications


leukocytosis; hydronephrosis; BPH





Description of Procedure


Patient was identified in the preoperative holding area, appropriate informed 

consents reviewed and completed he was transported to the operating suite.  

After note he has been on Zosyn and ceftriaxone on the floor no additional 

antibiotics were provided at the immediate time of surgery.  Upon arrival in 

the operative suite he received appropriate sedation was placed in dorsal 

lithotomy position where his catheter was removed and he was sterilely prepped 

and draped in standard fashion.  I began the case by passing a 22 Russian 

cystoscope with 30 lens.  Inspection of the urethra revealed no evidence of 

stricture disease.  His prostate was inspected and noted to be quite large.  He 

has significant lateral lobe hypertrophy as well as a large intravesical 

component.  It does appear that he would be a candidate for a TURP in the 

future to help with his retention.  Following my inspection of the prostate, 

conducted a full evaluation of the bladder.  He is moderately to severely 

trabeculated.  As I search for the ureteral orifices, the right ureteral 

orifice was easily identified the left appear to be easily identified as well.  

Given the left hydronephrosis, I turned my attention to this left ureteral 

orifice and attempted to cannulate it with a sensor wire and open-ended 

catheter.  Became immediately apparent at the angle of this ureter was going to 

be easy to allow quick cannulation.  I ultimately switched to an angle tipped 

sensor wire as well as a  catheter and a 70 lens to help facilitate 

appropriate visualization and angulation to allow the wire to pass.  After 

numerous efforts and struggles, I was able to successfully guide a wire into 

the ureter and around the severely J-hooked distal aspect.  With care, I was 

able to pass the catheter over this wire into the mid ureter before 

straightening the distal J-hook.  I then was able to advance the wire further 

encountering's further tortuosity in the proximal ureter.  I was able to pass a 

significant amount of wire through the loop of the proximal ureter and into the 

kidney before advancing the catheter over it and then retracting slightly to 

straighten this aspect of the ureter as well.  After positioning the tip of the 

catheter into the presumed renal pelvis, I withdrew the wire and I injected a 

small amount of contrast into the renal pelvis to confirm my location.  

Fortunately, this did confirm a renal pelvic location.  As I gently eusebio back 

on the catheter with a syringe, I noted purulent urine from the left kidney.  A 

wire was replaced in the mid kidney followed by placement of a 6 Russian by 22-

32 cm double-J ureteral stent.  A good curl in the midportion of the kidney as 

well as in the bladder.  A 20 Russian coud catheter was placed without 

difficulty and the case was concluded.  He was reversed from anesthesia and 

taken to the PACU in stable condition.


I attest to the content of the Intraoperative Record and any orders documented 

therein.  Any exceptions are noted below.

## 2018-02-26 NOTE — FAMILY MEDICINE PROGRESS NOTE
Progress Note


Date of Service


Feb 26, 2018.





Subjective


Pt evaluation today including:  conversation w/ patient, physical exam, chart 

review, lab review, review of inpatient medication list


Pain:  Bilateral back pain and suprapubic pain noted


PO Intake:  NPO except meds


Voiding:  alex catheter in place


Mr. Santos reports suprapubic pain and bilateral flank pain. He states yesterday 

the flank pain was left sided, but today he is experiencing the pain on both 

sides. He denies fever, chills, chest pain, SOB, n/v, or feeling dizzy. He 

notes he has had this catheter in for almost 1 month.





   Constitutional:  No fever, No chills


   Respiratory:  No cough, No sputum, No wheezing, No shortness of breath


   Cardiovascular:  No chest pain


   Abdomen:  + pain, No nausea, No vomiting


   Male :  No hematuria


   All Other Systems:  Reviewed and Negative





Medications





Current Inpatient Medications








 Medications


  (Trade)  Dose


 Ordered  Sig/Juaquin


 Route  Start Time


 Stop Time Status Last Admin


Dose Admin


 


 Acetaminophen


  (Tylenol Tab)  650 mg  Q4H  PRN


 PO  2/25/18 15:45


 3/27/18 15:44   


 


 


 Al Hydrox/Mg


 Hydrox/Simethicone


  (Maalox Max Susp)  15 ml  Q4H  PRN


 PO  2/25/18 15:45


 3/27/18 15:44   


 


 


 Magnesium


 Hydroxide


  (Milk Of


 Magnesia Susp)  30 ml  Q6H  PRN


 PO  2/25/18 15:45


 3/27/18 15:44   


 


 


 Polyethylene


  (Miralax Powder


 Packet)  17 gm  DAILY  PRN


 PO  2/25/18 15:45


 3/27/18 15:44   


 


 


 Ondansetron HCl


  (Zofran Inj)  4 mg  Q6H  PRN


 IV  2/25/18 15:45


 3/27/18 15:44   


 


 


 Aspirin


  (Ecotrin Tab)  81 mg  BID


 PO  2/25/18 20:00


 3/27/18 20:59  2/26/18 09:15


81 MG


 


 Finasteride


  (Proscar Tab)  5 mg  HS


 PO  2/25/18 21:00


 3/27/18 20:59  2/25/18 19:50


5 MG


 


 Folic Acid


  (Folvite Tab)  1 mg  QAM


 PO  2/26/18 08:00


 3/28/18 08:59  2/26/18 09:16


1 MG


 


 Metoprolol


 Tartrate


  (Lopressor Tab)  25 mg  DAILY


 PO  2/26/18 08:00


 3/28/18 08:59  2/26/18 09:14


25 MG


 


 Multivitamins


  (Multivitamin


 Tab)  1 tab  QAM


 PO  2/26/18 08:00


 3/28/18 08:59  2/26/18 09:14


1 TAB


 


 Potassium Chloride


  (Klor-Con Tab)  20 meq  BID


 PO  2/25/18 20:00


 3/27/18 20:59  2/26/18 09:16


20 MEQ


 


 Prednisone


  (PredniSONE TAB)  40 mg  DAILY


 PO  2/26/18 08:00


 3/28/18 08:59  2/26/18 09:15


40 MG


 


 Ranitidine HCl


  (zANTac TAB)  150 mg  BID


 PO  2/25/18 20:00


 3/27/18 20:59  2/26/18 09:15


150 MG


 


 Tamsulosin HCl


  (Flomax Cap)  0.4 mg  DAILY


 PO  2/26/18 08:00


 3/28/18 08:59  2/26/18 09:14


0.4 MG


 


 Ferrous Sulfate


  (Feosol Tab)  325 mg  BIDM


 PO  2/25/18 17:00


 3/27/18 17:59  2/26/18 09:16


325 MG


 


 Pantoprazole


 Sodium


  (Protonix Tab)  40 mg  QAM


 PO  2/26/18 08:00


 3/28/18 08:59  2/26/18 09:17


40 MG


 


 Sodium Chloride  1,000 ml @ 


 100 mls/hr  Q10H


 IV  2/25/18 16:00


 3/27/18 15:59  2/26/18 06:14


100 MLS/HR


 


 Oxycodone/


 Acetaminophen


  (Percocet


 5-325mg Tab)  1 tab  Q4H  PRN


 PO  2/25/18 15:45


 3/11/18 15:44  2/26/18 09:17


1 TAB


 


 Miscellaneous


 Information


  (Consult)  1 ea  UD  PRN


 N/A  2/25/18 19:00


 3/27/18 18:59   


 


 


 Fluconazole/


 Sodium Chloride


 100 mg/Prmx  50 ml @ 


 100 mls/hr  Q24H


 IV  2/25/18 20:00


 3/7/18 19:59  2/25/18 20:27


100 MLS/HR


 


 Piperacillin Sod/


 Tazobactam Sod


 3.375 gm/Dextrose  115 ml @ 


 28.75 mls/


 hr  Q8H


 IV  2/26/18 02:00


 3/7/18 01:59  2/26/18 09:48


28.75 MLS/HR











Objective


Vital Signs











  Date Time  Temp Pulse Resp B/P (MAP) Pulse Ox O2 Delivery O2 Flow Rate FiO2


 


2/26/18 09:15      Room Air  


 


2/26/18 08:27     95 Room Air  


 


2/26/18 07:39 36.7 89 19 110/64 (79) 96 Room Air  


 


2/26/18 00:40      Room Air  


 


2/25/18 23:10 36.7 98 21 105/61 (76) 98 Room Air  


 


2/25/18 19:32     95 Room Air  


 


2/25/18 17:03 36.8 96 19 154/88 (110) 95 Room Air  


 


2/25/18 16:37  89 17 151/87 95 Room Air  


 


2/25/18 15:44     95 Room Air  


 


2/25/18 15:30  90 16 157/91 95 Room Air  


 


2/25/18 13:43  78 18 161/62 95 Room Air  











Physical Exam


General Appearance:  WD/WN, no apparent distress, + pertinent finding (alex 

catheter in place, draining dark yellow urine)


Respiratory/Chest:  chest non-tender, lungs clear, normal breath sounds, no 

respiratory distress


Cardiovascular:  regular rate, rhythm, no edema, no gallop, no JVD, no murmur


Abdomen:  normal bowel sounds, soft, + tenderness (over suprapubic area), + 

pertinent finding (b/l flank tenderness)





Laboratory Results





Last 24 Hours








Test


  2/25/18


14:08 2/25/18


15:31 2/25/18


18:48 2/26/18


05:50


 


Urine Color DK YELLOW    


 


Urine Appearance CLOUDY    


 


Urine pH 8.5    


 


Urine Specific Gravity 1.016    


 


Urine Protein 1+    


 


Urine Glucose (UA) NEG    


 


Urine Ketones NEG    


 


Urine Occult Blood 3+    


 


Urine Nitrite POS    


 


Urine Bilirubin NEG    


 


Urine Urobilinogen NEG    


 


Urine Leukocyte Esterase LARGE    


 


Urine WBC (Auto) >30 /hpf    


 


Urine RBC (Auto) >30 /hpf    


 


Urine Hyaline Casts (Auto) 1-5 /lpf    


 


Urine Epithelial Cells (Auto) 0-5 /lpf    


 


Urine Bacteria (Auto) 1+    


 


Lactic Acid Level  2.4 mmol/L  1.9 mmol/L  


 


Sodium Level   139 mmol/L  140 mmol/L 


 


Potassium Level   4.6 mmol/L  4.6 mmol/L 


 


Chloride Level   103 mmol/L  106 mmol/L 


 


Carbon Dioxide Level   30 mmol/L  27 mmol/L 


 


Anion Gap   6.0 mmol/L  7.0 mmol/L 


 


Blood Urea Nitrogen   53 mg/dl  56 mg/dl 


 


Creatinine   2.47 mg/dl  2.86 mg/dl 


 


Est Creatinine Clear Calc


Drug Dose 


  


  21.8 ml/min 


  18.8 ml/min 


 


 


Estimated GFR (


American) 


  


  28.1 


  23.5 


 


 


Estimated GFR (Non-


American 


  


  24.2 


  20.3 


 


 


BUN/Creatinine Ratio   21.3  19.6 


 


Random Glucose   91 mg/dl  81 mg/dl 


 


Calcium Level   8.6 mg/dl  7.4 mg/dl 


 


White Blood Count    23.60 K/uL 


 


Red Blood Count    3.01 M/uL 


 


Hemoglobin    7.9 g/dL 


 


Hematocrit    25.6 % 


 


Mean Corpuscular Volume    85.0 fL 


 


Mean Corpuscular Hemoglobin    26.2 pg 


 


Mean Corpuscular Hemoglobin


Concent 


  


  


  30.9 g/dl 


 


 


RDW Standard Deviation    74.0 fL 


 


RDW Coefficient of Variation    24.8 % 


 


Platelet Count    136 K/uL 


 


Mean Platelet Volume    10.6 fL 


 


Test


  2/26/18


11:30 


  


  


 


 


Prothrombin Time 12.5 SECONDS    


 


Prothromb Time International


Ratio 1.2 


  


  


  


 











Assessment and Plan


Mr. Santos is a 77 year old male with a past medical history of rheumatoid 

arthritis on prednisone, BPH, Anemia of chronic disease, HTN, HLD, primary OA 

who was admitted for 12 hours of left-sided CVA pain and suprapubic pain on top 

of a 3 day hx of increasing sediment, pus, and orange color of urine in his 

indwelling catheter. Recent admission to [facility] from 2/2-14/18 for LINDA in setting 

of urinary retention secondary to worsening BPH.  During that admission he was 

found to have left sided hydroureteronephrosis. Was treated for complicated UTI 

with rocephin x 5 days. He had been seen by urology who had discussed 

possibility of inserting a stent but as he clinically improved, they decided to 

opt for outpatient follow up, which has not occurred yet. 





Pyelonephritis/Left VUJ obstruction/L Hydroureteronephrosis


- Ceftriaxone x 1 G given in ED


- currently on zosyn


- CT showed moderate left hydroureteronephrosis with dilatation of the left 

ureter to the level the distal ureter. No ureteral calculi. Moderate left 

perinephric infiltration. 


- blood cultures grew gram positive cocci x2 and urine culture grew staph 

aureus - may need widening of coverage to include MRSA - will consult ID


- thank you to urology for consult


   - NPO for cystoscopy and placement of left ureteral stent today


   - continue IV zosyn (2.25 g IV q 8 hours due to EGFR) and fluconazole 

pending culture sensitivities


   - 4-6 weeks oral cipro or bactrim on d/c for suspected prostatitis


- WCC elevated at 23


- Lactate 2.4, repeat 1.9


- Percocet prn q4h for pain management


- NaCl at 100 mL/hr





Acute Kidney Injury


- creatinine elevated to 2.86. Pt states baseline recently has been around 2, 

but this is far worse than prior relatively recent baseline of 1 in 2017


- urinary output decreased - pt produced 19mls/kg/hr yesterday


- Continue to hold amlodipine (held prior to admission)


- thank you to nephrology for consult


   - monitor serial BMP


   - outpatient metabolic stone eval once stable


   - contrast and abdominal CT/MRI for left kidney complex cyst after 

resolution of LINDA





Anemia


- likely anemia of chronic disease related to RA


- Hgb dropped from 10.1 to 7.9 -> probably dilutional - will transfuse if Hgb 

drops below 7.5 as per nephro recommendations


- Type and screen ordered


- Continue iron supplementation - 325mg ferrous sulfate bid


- Had been given procrit at [facility] as well as 4 units of venofer





BPH, chronic x 15 years, worsening


- Continue flomax and finasteride


- Patient started on indwelling catheter at [facility]





RA


- Continue to hold leflunomide 2/2 recent weight loss


- Steroid taper from recent hospitalization - was taking 60mg on admission. 

Today, he received 40mg. Will taper to 20mg tomorrow.


- Takes 5 mg daily at baseline


- continue folic acid





HTN


- continue Metoprolol 25 mg OD


- continue ASA 81 mg





DVT Prophylaxis: SCDs


Code: Full


Dispo: remains on med/surg





Resident Physician Supervision Note:





I was present with the resident physician during the history and exam. I 

discussed the case with the resident and agree with the findings and plan as 

documented in the note.  Patient for cystoscopy and possible ureteral stenting 

this afternoon; appreciate nephrology and infectious disease consultations.





1) daptomycin added to current regimen pending culture sensitivities


2) consider transfusion if hemoglobin less than 7.5


3) cystoscopy and possible ureteral stenting this afternoon


4) continue present taper (from prior hospitalization) 











Documented By:  German Woody





Resident Tracking


Resident Involvement:  Resident Care Provided


Care Provided:  Adult Hospital Medicine

## 2018-02-26 NOTE — ANESTHESIOLOGY PROGRESS NOTE
Anesthesia Post Op Note


Date & Time


Feb 26, 2018 at 17:34





Vital Signs


Pain Intensity:  0





Vital Signs Past 12 Hours








  Date Time  Temp Pulse Resp B/P (MAP) Pulse Ox O2 Delivery O2 Flow Rate FiO2


 


2/26/18 17:00 36.2 63 14 148/90 99 Nasal Cannula 2 


 


2/26/18 16:50 36.2 66 14 130/86 98 Nasal Cannula 2 


 


2/26/18 16:40  56 14 141/75 98 Nasal Cannula 2 


 


2/26/18 16:32 36.2 56 14 139/75 98 Nasal Cannula 2 


 


2/26/18 14:41 36.5 58 18 106/65 (79) 95 Room Air  


 


2/26/18 14:34 36.4 62 20 110/64 (79) 95 Room Air  


 


2/26/18 09:15      Room Air  


 


2/26/18 08:27     95 Room Air  


 


2/26/18 07:39 36.7 89 19 110/64 (79) 96 Room Air  











Notes


Mental Status:  alert / awake / arousable, participated in evaluation


Pt Amnestic to Procedure:  Yes


Nausea / Vomiting:  adequately controlled


Pain:  adequately controlled


Airway Patency, RR, SpO2:  stable & adequate


BP & HR:  stable & adequate


Hydration State:  stable & adequate


Anesthetic Complications:  no major complications apparent

## 2018-02-26 NOTE — DIAGNOSTIC IMAGING REPORT
CHEST 2 VIEWS ROUTINE



CLINICAL HISTORY: 77 years-old Male presenting with pre-op. 



TECHNIQUE: PA and lateral views of the chest were obtained.



COMPARISON: 7/7/2017.



FINDINGS:

Atherosclerosis of the aortic arch. Cardiac silhouette normal in size. Lungs and

pleural spaces clear. Degenerative changes of the thoracic spine. Anchors noted

in the right humeral head. Chronic widening of the acromioclavicular joints

greater on the left. Upper abdomen normal.



IMPRESSION:

1.  No acute cardiopulmonary disease.







Electronically signed by:  Dariel Boyce M.D.

2/26/2018 10:12 AM



Dictated Date/Time:  2/26/2018 10:11 AM

## 2018-02-27 VITALS
OXYGEN SATURATION: 96 % | SYSTOLIC BLOOD PRESSURE: 151 MMHG | DIASTOLIC BLOOD PRESSURE: 80 MMHG | HEART RATE: 79 BPM | TEMPERATURE: 98.06 F

## 2018-02-27 VITALS
OXYGEN SATURATION: 95 % | HEART RATE: 69 BPM | DIASTOLIC BLOOD PRESSURE: 94 MMHG | SYSTOLIC BLOOD PRESSURE: 158 MMHG | TEMPERATURE: 97.52 F

## 2018-02-27 VITALS
HEART RATE: 68 BPM | SYSTOLIC BLOOD PRESSURE: 152 MMHG | TEMPERATURE: 97.52 F | OXYGEN SATURATION: 98 % | DIASTOLIC BLOOD PRESSURE: 78 MMHG

## 2018-02-27 VITALS
DIASTOLIC BLOOD PRESSURE: 69 MMHG | OXYGEN SATURATION: 96 % | SYSTOLIC BLOOD PRESSURE: 130 MMHG | HEART RATE: 69 BPM | TEMPERATURE: 97.34 F

## 2018-02-27 VITALS — OXYGEN SATURATION: 97 %

## 2018-02-27 LAB
BASOPHILS # BLD: 0 K/UL (ref 0–0.2)
BASOPHILS NFR BLD: 0 %
BUN SERPL-MCNC: 56 MG/DL (ref 7–18)
CALCIUM SERPL-MCNC: 7.9 MG/DL (ref 8.5–10.1)
CO2 SERPL-SCNC: 22 MMOL/L (ref 21–32)
CREAT SERPL-MCNC: 2.98 MG/DL (ref 0.6–1.4)
EOS ABS #: 0.1 K/UL (ref 0–0.5)
EOSINOPHIL NFR BLD AUTO: 112 K/UL (ref 130–400)
EOSINOPHIL NFR BLD AUTO: 132 K/UL (ref 130–400)
GLUCOSE SERPL-MCNC: 108 MG/DL (ref 70–99)
HCT VFR BLD CALC: 24.5 % (ref 42–52)
HCT VFR BLD CALC: 27.6 % (ref 42–52)
HGB BLD-MCNC: 7.8 G/DL (ref 14–18)
HGB BLD-MCNC: 8.6 G/DL (ref 14–18)
IG#: 0.05 K/UL (ref 0–0.02)
IMM GRANULOCYTES NFR BLD AUTO: 4.5 %
LYMPHOCYTES # BLD: 0.79 K/UL (ref 1.2–3.4)
MCH RBC QN AUTO: 26.4 PG (ref 25–34)
MCH RBC QN AUTO: 26.9 PG (ref 25–34)
MCHC RBC AUTO-ENTMCNC: 31.2 G/DL (ref 32–36)
MCHC RBC AUTO-ENTMCNC: 31.8 G/DL (ref 32–36)
MCV RBC AUTO: 84.5 FL (ref 80–100)
MCV RBC AUTO: 84.7 FL (ref 80–100)
MONO ABS #: 0.32 K/UL (ref 0.11–0.59)
MONOCYTES NFR BLD: 1.8 %
NEUT ABS #: 16.43 K/UL (ref 1.4–6.5)
NEUTROPHILS # BLD AUTO: 0.6 %
NEUTROPHILS NFR BLD AUTO: 92.8 %
PMV BLD AUTO: 10.5 FL (ref 7.4–10.4)
PMV BLD AUTO: 10.6 FL (ref 7.4–10.4)
POTASSIUM SERPL-SCNC: 4.3 MMOL/L (ref 3.5–5.1)
RED CELL DISTRIBUTION WIDTH CV: 24.6 % (ref 11.5–14.5)
RED CELL DISTRIBUTION WIDTH CV: 24.9 % (ref 11.5–14.5)
RED CELL DISTRIBUTION WIDTH SD: 73.3 FL (ref 36.4–46.3)
RED CELL DISTRIBUTION WIDTH SD: 74.1 FL (ref 36.4–46.3)
SODIUM SERPL-SCNC: 139 MMOL/L (ref 136–145)
WBC # BLD AUTO: 17.18 K/UL (ref 4.8–10.8)
WBC # BLD AUTO: 17.69 K/UL (ref 4.8–10.8)

## 2018-02-27 RX ADMIN — OXYBUTYNIN CHLORIDE SCH MG: 5 TABLET ORAL at 19:54

## 2018-02-27 RX ADMIN — RANITIDINE SCH MG: 150 TABLET ORAL at 19:54

## 2018-02-27 RX ADMIN — OXYCODONE HYDROCHLORIDE AND ACETAMINOPHEN PRN TAB: 5; 325 TABLET ORAL at 08:16

## 2018-02-27 RX ADMIN — Medication SCH TAB: at 08:23

## 2018-02-27 RX ADMIN — SODIUM CHLORIDE SCH MLS/HR: 900 INJECTION, SOLUTION INTRAVENOUS at 09:58

## 2018-02-27 RX ADMIN — Medication SCH MG: at 19:54

## 2018-02-27 RX ADMIN — PIPERACILLIN AND TAZOBACTAM SCH MLS/HR: 3; .375 INJECTION, POWDER, LYOPHILIZED, FOR SOLUTION INTRAVENOUS; PARENTERAL at 02:22

## 2018-02-27 RX ADMIN — SODIUM CHLORIDE SCH MLS/HR: 900 INJECTION, SOLUTION INTRAVENOUS at 23:12

## 2018-02-27 RX ADMIN — METOPROLOL TARTRATE SCH MG: 25 TABLET, FILM COATED ORAL at 08:24

## 2018-02-27 RX ADMIN — OXYBUTYNIN CHLORIDE SCH MG: 5 TABLET ORAL at 13:53

## 2018-02-27 RX ADMIN — SODIUM CHLORIDE SCH MLS/HR: 900 INJECTION, SOLUTION INTRAVENOUS at 16:32

## 2018-02-27 RX ADMIN — OXYCODONE HYDROCHLORIDE AND ACETAMINOPHEN PRN TAB: 5; 325 TABLET ORAL at 03:30

## 2018-02-27 RX ADMIN — Medication SCH MG: at 08:25

## 2018-02-27 RX ADMIN — TAMSULOSIN HYDROCHLORIDE SCH MG: 0.4 CAPSULE ORAL at 08:25

## 2018-02-27 RX ADMIN — FERROUS SULFATE TAB EC 325 MG (65 MG FE EQUIVALENT) SCH MG: 325 (65 FE) TABLET DELAYED RESPONSE at 16:11

## 2018-02-27 RX ADMIN — FERROUS SULFATE TAB EC 325 MG (65 MG FE EQUIVALENT) SCH MG: 325 (65 FE) TABLET DELAYED RESPONSE at 08:24

## 2018-02-27 RX ADMIN — PANTOPRAZOLE SCH MG: 40 TABLET, DELAYED RELEASE ORAL at 08:23

## 2018-02-27 RX ADMIN — OXYCODONE HYDROCHLORIDE AND ACETAMINOPHEN PRN TAB: 5; 325 TABLET ORAL at 19:53

## 2018-02-27 RX ADMIN — FINASTERIDE SCH MG: 5 TABLET, FILM COATED ORAL at 19:55

## 2018-02-27 RX ADMIN — RANITIDINE SCH MG: 150 TABLET ORAL at 08:23

## 2018-02-27 RX ADMIN — POTASSIUM CHLORIDE SCH MEQ: 1500 TABLET, EXTENDED RELEASE ORAL at 08:25

## 2018-02-27 RX ADMIN — OXYCODONE HYDROCHLORIDE AND ACETAMINOPHEN PRN TAB: 5; 325 TABLET ORAL at 12:30

## 2018-02-27 RX ADMIN — POTASSIUM CHLORIDE SCH MEQ: 1500 TABLET, EXTENDED RELEASE ORAL at 19:53

## 2018-02-27 NOTE — PROGRESS NOTE
Subjective


Date of Service:


Feb 27, 2018.


Subjective


Pt evaluation today including:  conversation w/ patient, physical exam, chart 

review, lab review


Moderate stent pain overnight -no longer CVA tenderness, now with left lower 

quadrant tenderness


Mild hematuria


Some improvement in his leukocytosis


No improvement in his renal function yet





Problem List


Medical Problems:


(1) Acute renal failure


Status: Acute  





(2) Leukocytosis


Status: Acute  





(3) Urinary tract infection


Status: Acute  











Review of Systems


Constitutional:  No see HPI, No fever, No chills, No sweats, No weight loss, No 

weakness, No fatigue, No problem reported


Eyes:  No see HPI, No worsening of vision, No eye pain, No redness, No discharge

, No diplopia, No problem reported


ENT:  No see HPI, No hearing loss, No unusual epistaxis, No nasal symptoms, No 

sore throat, No tinnitus, No dental problems, No trouble swallowing, No problem 

reported


Respiratory:  No see HPI, No cough, No sputum, No wheezing, No shortness of 

breath, No dyspnea on exertion, No dyspnea at rest, No hemoptysis, No problem 

reported


Cardiac:  No see HPI, No chest pain, No orthopnea, No PND, No edema, No 

claudication, No palpitations, No problem reported


Breast:  No see HPI, No breast lump, No change in shape, No nipple discharge, 

No breast pain, No problem reported


Abdomen:  + pain


Musculoskeletal:  No see HPI, No joint pain, No muscle pain, No swelling, No 

calf pain, No problem reported


Male :  + see HPI, + dysuria, + hematuria, No urinary frequency, No 

incontinence, No nocturia more than once/night, No slowing stream, No sexual 

dysfunction, No problem reported


Neurologic:  No see HPI, No memory loss, No paralysis, No weakness, No numbness/

tingling, No vertigo, No balance problems, No problem reported


Psychiatric:  No see HPI, No depression symptoms, No anhedonism, No anxiety, No 

insomnia, No substance abuse, No problem reported





Objective


Vital Signs











  Date Time  Temp Pulse Resp B/P (MAP) Pulse Ox O2 Delivery O2 Flow Rate FiO2


 


2/27/18 07:07 36.3 69 17 130/69 (89) 96 Room Air  


 


2/27/18 03:54 36.7 79 20 151/80 (103) 96 Room Air  


 


2/27/18 00:00     97 Room Air  


 


2/26/18 23:20 36.5 73 18 125/76 (92) 95 Room Air  


 


2/26/18 20:23 36.4 68 20 124/75 (91) 96 Room Air  


 


2/26/18 20:23     97 Nasal Cannula 2.0 


 


2/26/18 19:33 36.3 59 20 160/81 (107) 97 Room Air  


 


2/26/18 17:15     99 Nasal Cannula 2.0 


 


2/26/18 17:00 36.2 63 14 148/90 99 Nasal Cannula 2 


 


2/26/18 16:50 36.2 66 14 130/86 98 Nasal Cannula 2 


 


2/26/18 16:40  56 14 141/75 98 Nasal Cannula 2 


 


2/26/18 16:32 36.2 56 14 139/75 98 Nasal Cannula 2 


 


2/26/18 14:41 36.5 58 18 106/65 (79) 95 Room Air  


 


2/26/18 14:34 36.4 62 20 110/64 (79) 95 Room Air  


 


2/26/18 09:15      Room Air  











Physical Exam


General Appearance:  no apparent distress


Eyes:  normal inspection


ENT:  hearing grossly normal


Neck:  no adenopathy


Respiratory/Chest:  no respiratory distress, no accessory muscle use


Cardiovascular:  no edema


Abdomen:  non tender, soft


Extremities:  non-tender, no pedal edema, no calf tenderness


Neurologic/Psychiatric:  alert, normal mood/affect, oriented x 3


Skin:  warm/dry


Lymphatic:  no adenopathy





Laboratory Results





Last 24 Hours








Test


  2/26/18


11:30 2/26/18


18:29 2/26/18


20:22 2/27/18


05:20


 


Prothrombin Time 12.5 SECONDS    


 


Prothromb Time International


Ratio 1.2 


  


  


  


 


 


White Blood Count  19.36 K/uL   17.18 K/uL 


 


Red Blood Count  3.18 M/uL   2.90 M/uL 


 


Hemoglobin  8.3 g/dL   7.8 g/dL 


 


Hematocrit  27.2 %   24.5 % 


 


Mean Corpuscular Volume  85.5 fL   84.5 fL 


 


Mean Corpuscular Hemoglobin  26.1 pg   26.9 pg 


 


Mean Corpuscular Hemoglobin


Concent 


  30.5 g/dl 


  


  31.8 g/dl 


 


 


RDW Standard Deviation  75.1 fL   73.3 fL 


 


RDW Coefficient of Variation  25.0 %   24.6 % 


 


Platelet Count  127 K/uL   112 K/uL 


 


Mean Platelet Volume  10.6 fL   10.5 fL 


 


Sodium Level  138 mmol/L   139 mmol/L 


 


Potassium Level  5.1 mmol/L   4.3 mmol/L 


 


Chloride Level  106 mmol/L   108 mmol/L 


 


Carbon Dioxide Level  23 mmol/L   22 mmol/L 


 


Anion Gap  8.0 mmol/L   9.0 mmol/L 


 


Blood Urea Nitrogen  58 mg/dl   56 mg/dl 


 


Creatinine  3.07 mg/dl   2.98 mg/dl 


 


Est Creatinine Clear Calc


Drug Dose 


  17.5 ml/min 


  


  18.1 ml/min 


 


 


Estimated GFR (


American) 


  21.6 


  


  22.4 


 


 


Estimated GFR (Non-


American 


  18.6 


  


  19.3 


 


 


BUN/Creatinine Ratio  18.8   18.9 


 


Random Glucose  139 mg/dl   108 mg/dl 


 


Calcium Level  8.0 mg/dl   7.9 mg/dl 


 


Bedside Glucose   131 mg/dl  


 


Test


  2/27/18


07:50 2/27/18


08:25 


  


 


 


Bedside Glucose 95 mg/dl    


 


White Blood Count  17.69 K/uL   


 


Red Blood Count  3.26 M/uL   


 


Hemoglobin  8.6 g/dL   


 


Hematocrit  27.6 %   


 


Mean Corpuscular Volume  84.7 fL   


 


Mean Corpuscular Hemoglobin  26.4 pg   


 


Mean Corpuscular Hemoglobin


Concent 


  31.2 g/dl 


  


  


 


 


Platelet Count  132 K/uL   


 


Mean Platelet Volume  10.6 fL   


 


Neutrophils (%) (Auto)  92.8 %   


 


Lymphocytes (%) (Auto)  4.5 %   


 


Monocytes (%) (Auto)  1.8 %   


 


Eosinophils (%) (Auto)  0.6 %   


 


Basophils (%) (Auto)  0.0 %   


 


Neutrophils # (Auto)  16.43 K/uL   


 


Lymphocytes # (Auto)  0.79 K/uL   


 


Monocytes # (Auto)  0.32 K/uL   


 


Eosinophils # (Auto)  0.10 K/uL   


 


Basophils # (Auto)  0.00 K/uL   


 


RDW Standard Deviation  74.1 fL   


 


RDW Coefficient of Variation  24.9 %   


 


Immature Granulocyte % (Auto)  0.3 %   


 


Immature Granulocyte # (Auto)  0.05 K/uL   











Assessment and Plan


Urinary retention; left hydronephrosis; leukocytosis; renal failure





Stent placed yesterday  -very challenging placement secondary to tortuosity of 

the ureter, all suspected to be driven by enlargement of the prostate and 

subsequent ureteral obstruction.  Grossly purulent urine drained from the left 

kidney after stent placement.  Current pain is as expected after his procedure.





Lutz catheter in position again





Lengthy discussion today about our plan from here





Continue antibiotics -priority #1 will be complete control of any infectious 

process





Once we have achieved resolution of infectious issues, we can consider planning 

for TURP and potential stent removal





He reports his renal function was quite good several months ago, and I am 

hopeful we will make some progress back towards his baseline -he is being 

followed by Dr. Mason as well





For the time being, he needs to continue with a left ureteral stent

## 2018-02-27 NOTE — INFECTIOUS DISEASE PROGRESS NT
Progress Note


Date of Service


2018.





Subjective


Pt evaluation today including:  conversation w/ patient, physical exam, chart 

review, lab review, review of studies, conversation w/ consultant, review of 

inpatient medication list


Patient is status post placement of ureteral stent with copious purulent 

material from kidney obtained.  Feels better, less flank and abdominal pain.  

Currently afebrile and hemodynamically stable.





   All Other Systems:  Reviewed and Negative





Medications





Current Inpatient Medications








 Medications


  (Trade)  Dose


 Ordered  Sig/Juaquin


 Route  Start Time


 Stop Time Status Last Admin


Dose Admin


 


 Acetaminophen


  (Tylenol Tab)  650 mg  Q4H  PRN


 PO  18 15:45


 3/27/18 15:44   


 


 


 Al Hydrox/Mg


 Hydrox/Simethicone


  (Maalox Max Susp)  15 ml  Q4H  PRN


 PO  18 15:45


 3/27/18 15:44   


 


 


 Magnesium


 Hydroxide


  (Milk Of


 Magnesia Susp)  30 ml  Q6H  PRN


 PO  18 15:45


 3/27/18 15:44   


 


 


 Polyethylene


  (Miralax Powder


 Packet)  17 gm  DAILY  PRN


 PO  18 15:45


 3/27/18 15:44   


 


 


 Ondansetron HCl


  (Zofran Inj)  4 mg  Q6H  PRN


 IV  18 15:45


 3/27/18 15:44   


 


 


 Aspirin


  (Ecotrin Tab)  81 mg  BID


 PO  18 20:00


 3/27/18 20:59  18 19:54


81 MG


 


 Finasteride


  (Proscar Tab)  5 mg  HS


 PO  18 21:00


 3/27/18 20:59  18 19:55


5 MG


 


 Folic Acid


  (Folvite Tab)  1 mg  QAM


 PO  18 08:00


 3/28/18 08:59  18 08:25


1 MG


 


 Metoprolol


 Tartrate


  (Lopressor Tab)  25 mg  DAILY


 PO  18 08:00


 3/28/18 08:59  18 08:24


25 MG


 


 Multivitamins


  (Multivitamin


 Tab)  1 tab  QAM


 PO  18 08:00


 3/28/18 08:59  18 08:23


1 TAB


 


 Potassium Chloride


  (Klor-Con Tab)  20 meq  BID


 PO  18 20:00


 3/27/18 20:59  18 19:53


20 MEQ


 


 Ranitidine HCl


  (zANTac TAB)  150 mg  BID


 PO  18 20:00


 3/27/18 20:59  18 19:54


150 MG


 


 Tamsulosin HCl


  (Flomax Cap)  0.4 mg  DAILY


 PO  18 08:00


 3/28/18 08:59  18 08:25


0.4 MG


 


 Ferrous Sulfate


  (Feosol Tab)  325 mg  BIDM


 PO  18 17:00


 3/27/18 17:59  18 16:11


325 MG


 


 Pantoprazole


 Sodium


  (Protonix Tab)  40 mg  QAM


 PO  18 08:00


 3/28/18 08:59  18 08:23


40 MG


 


 Sodium Chloride  1,000 ml @ 


 150 mls/hr  Q6H40M


 IV  18 16:00


 3/27/18 15:59  18 16:32


150 MLS/HR


 


 Oxycodone/


 Acetaminophen


  (Percocet


 5-325mg Tab)  1 tab  Q4H  PRN


 PO  18 15:45


 3/11/18 15:44  18 19:53


1 TAB


 


 Daptomycin 400 mg/


 Syringe  8 ml @ 4


 mls/min  Q48H


 IV  18 16:00


 3/12/18 15:59  18 17:30


4 MLS/MIN


 


 Oxybutynin


 Chloride


  (Ditropan Tab)  5 mg  TID


 PO  18 14:00


 3/29/18 13:59  18 19:54


5 MG


 


 Prednisone


  (PredniSONE TAB)  5 mg  DAILY


 PO  18 08:00


 3/28/18 08:59   


 











Objective


Vital Signs











  Date Time  Temp Pulse Resp B/P (MAP) Pulse Ox O2 Delivery O2 Flow Rate FiO2


 


18 15:55 36.4 68 22 152/78 (102) 98 Room Air  


 


18 09:25      Room Air  


 


18 08:15      Room Air  


 


18 07:07 36.3 69 17 130/69 (89) 96 Room Air  


 


18 03:54 36.7 79 20 151/80 (103) 96 Room Air  


 


2/27/18 00:00     97 Room Air  


 


18 23:20 36.5 73 18 125/76 (92) 95 Room Air  











Physical Exam


General Appearance:  WD/WN, no apparent distress


Eyes:  normal inspection, EOMI, sclerae normal


ENT:  normal ENT inspection, hearing grossly normal, pharynx normal


Neck:  supple, no adenopathy, thyroid normal, trachea midline


Respiratory/Chest:  chest non-tender, lungs clear, normal breath sounds, no 

respiratory distress


Cardiovascular:  regular rate, rhythm, no gallop, no murmur


Abdomen:  normal bowel sounds, non tender, soft, no organomegaly, + pertinent 

finding (Lutz with bloody urine)


Extremities:  non-tender, no calf tenderness, normal capillary refill


Neurologic/Psychiatric:  alert, oriented x 3


Skin:  normal color, warm/dry, no rash


Lymphatic:  no adenopathy





Laboratory Results





--------------------------------------------------------------------------------

------------





RUN DATE: 18                [facility]             

    PAGE 1   


RUN TIME: 1109                            Specimen Inquiry                    


--------------------------------------------------------------------------------

------------





PATIENT: DARRYL CRARILLO                 ACCT #: [id number] LOC:  FRANK       U #

: [id number]


                                       AGE/SX: 77/M         ROOM: E407       REG

: 18


REG DR:  German Woody D.O       :    1940   BED:  1          DIS

:         


                                       STATUS: ADM IN       TLOC:           


--------------------------------------------------------------------------------

------------








SPEC #: [id number]        FERMIN:      STATUS:  COMP           REQ 

#: [id number]


                            RECD:      [facility] DR: Emily Bowling PA-C      


SOURCE: BLOOD               ENTR: 18-     [facility] DR: Arnaldo Trinidad M.D. 

            


SPDESC:                                                      Vincent Dolan M.D. Miller, Howard I., MD, Urology


ORDERED:  BLOOD CULTURE                                                        

   


COMMENTS: Comments to Phlebotomist SAME TIME DIFFERENT SITES          


--------------------------------------------------------------------------------

------------





  Procedure                         Result                         Verified    

       Site


--------------------------------------------------------------------------------

------------





  BLD CULT  Final                                                  


     Organism 1                     STAPHYLOCOCCUS AUREUS


        SENS                        NO SENSITIVITY TO FOLLOW





        PLEASE SEE CULTURE NUMBER  FOR SENSITIVITIES.


        


        


        


        Phoned Positive Blood Culture Gram Stain Report to TAVARES RAMÍREZ on 18 At 0424 By WENDY. Results were


        verbalized back to WENDY.


        





--------------------------------------------------------------------------------

------------
































Last 24 Hours








Test


  18


05:20 18


07:50 18


08:25 18


11:33


 


White Blood Count 17.18 K/uL   17.69 K/uL  


 


Red Blood Count 2.90 M/uL   3.26 M/uL  


 


Hemoglobin 7.8 g/dL   8.6 g/dL  


 


Hematocrit 24.5 %   27.6 %  


 


Mean Corpuscular Volume 84.5 fL   84.7 fL  


 


Mean Corpuscular Hemoglobin 26.9 pg   26.4 pg  


 


Mean Corpuscular Hemoglobin


Concent 31.8 g/dl 


  


  31.2 g/dl 


  


 


 


RDW Standard Deviation 73.3 fL   74.1 fL  


 


RDW Coefficient of Variation 24.6 %   24.9 %  


 


Platelet Count 112 K/uL   132 K/uL  


 


Mean Platelet Volume 10.5 fL   10.6 fL  


 


Sodium Level 139 mmol/L    


 


Potassium Level 4.3 mmol/L    


 


Chloride Level 108 mmol/L    


 


Carbon Dioxide Level 22 mmol/L    


 


Anion Gap 9.0 mmol/L    


 


Blood Urea Nitrogen 56 mg/dl    


 


Creatinine 2.98 mg/dl    


 


Est Creatinine Clear Calc


Drug Dose 18.1 ml/min 


  


  


  


 


 


Estimated GFR (


American) 22.4 


  


  


  


 


 


Estimated GFR (Non-


American 19.3 


  


  


  


 


 


BUN/Creatinine Ratio 18.9    


 


Random Glucose 108 mg/dl    


 


Calcium Level 7.9 mg/dl    


 


Bedside Glucose  95 mg/dl   


 


Neutrophils (%) (Auto)   92.8 %  


 


Lymphocytes (%) (Auto)   4.5 %  


 


Monocytes (%) (Auto)   1.8 %  


 


Eosinophils (%) (Auto)   0.6 %  


 


Basophils (%) (Auto)   0.0 %  


 


Neutrophils # (Auto)   16.43 K/uL  


 


Lymphocytes # (Auto)   0.79 K/uL  


 


Monocytes # (Auto)   0.32 K/uL  


 


Eosinophils # (Auto)   0.10 K/uL  


 


Basophils # (Auto)   0.00 K/uL  


 


Immature Granulocyte % (Auto)   0.3 %  


 


Immature Granulocyte # (Auto)   0.05 K/uL  


 


Anisocytosis   PRESENT  


 


Microcytosis   PRESENT  


 


Echinocytes   1+  


 


Test


  18


16:51 


  


  


 


 


Bedside Glucose 156 mg/dl    











Assessment and Plan


Staph aureus bacteremia with positive urine culture for methicillin sensitive 

Staph aureus in the setting of obstructive uropathy and left hydronephrosis.  

Patient now status post stent placement, status post dose of daptomycin 

yesterday.  If blood cultures confirm methicillin sensitive Staph aureus, will 

change to IV cefazolin tomorrow.  Will likely require several weeks of IV 

antibiotic therapy.  Will follow.

## 2018-02-27 NOTE — FAMILY MEDICINE PROGRESS NOTE
Progress Note


Date of Service


Feb 27, 2018.





Subjective


Pt evaluation today including:  conversation w/ patient, physical exam, chart 

review, lab review, review of inpatient medication list


Pain:  Suprapubic pain noted


PO Intake:  Tolerating PO intake


Voiding:  alex catheter in place


Mr. Santos reports he feels as though he has turned the corner today. His 

bilateral back pain has resolved. He remains with suprapubic pain that occurs 

in waves, and is quite severe. He notes that percocet takes the edge off of the 

pain. He denies fever, chills, n/v, chest pain, SOB and is eating and drinking 

well.





   Constitutional:  No fever, No chills


   Respiratory:  No cough, No sputum, No wheezing, No shortness of breath


   Cardiovascular:  No chest pain


   Abdomen:  + pain, No nausea, No vomiting


   Male :  + hematuria


   All Other Systems:  Reviewed and Negative





Medications





Current Inpatient Medications








 Medications


  (Trade)  Dose


 Ordered  Sig/Juaquin


 Route  Start Time


 Stop Time Status Last Admin


Dose Admin


 


 Acetaminophen


  (Tylenol Tab)  650 mg  Q4H  PRN


 PO  2/25/18 15:45


 3/27/18 15:44   


 


 


 Al Hydrox/Mg


 Hydrox/Simethicone


  (Maalox Max Susp)  15 ml  Q4H  PRN


 PO  2/25/18 15:45


 3/27/18 15:44   


 


 


 Magnesium


 Hydroxide


  (Milk Of


 Magnesia Susp)  30 ml  Q6H  PRN


 PO  2/25/18 15:45


 3/27/18 15:44   


 


 


 Polyethylene


  (Miralax Powder


 Packet)  17 gm  DAILY  PRN


 PO  2/25/18 15:45


 3/27/18 15:44   


 


 


 Ondansetron HCl


  (Zofran Inj)  4 mg  Q6H  PRN


 IV  2/25/18 15:45


 3/27/18 15:44   


 


 


 Aspirin


  (Ecotrin Tab)  81 mg  BID


 PO  2/25/18 20:00


 3/27/18 20:59  2/27/18 08:25


81 MG


 


 Finasteride


  (Proscar Tab)  5 mg  HS


 PO  2/25/18 21:00


 3/27/18 20:59  2/26/18 21:39


5 MG


 


 Folic Acid


  (Folvite Tab)  1 mg  QAM


 PO  2/26/18 08:00


 3/28/18 08:59  2/27/18 08:25


1 MG


 


 Metoprolol


 Tartrate


  (Lopressor Tab)  25 mg  DAILY


 PO  2/26/18 08:00


 3/28/18 08:59  2/27/18 08:24


25 MG


 


 Multivitamins


  (Multivitamin


 Tab)  1 tab  QAM


 PO  2/26/18 08:00


 3/28/18 08:59  2/27/18 08:23


1 TAB


 


 Potassium Chloride


  (Klor-Con Tab)  20 meq  BID


 PO  2/25/18 20:00


 3/27/18 20:59  2/27/18 08:25


20 MEQ


 


 Ranitidine HCl


  (zANTac TAB)  150 mg  BID


 PO  2/25/18 20:00


 3/27/18 20:59  2/27/18 08:23


150 MG


 


 Tamsulosin HCl


  (Flomax Cap)  0.4 mg  DAILY


 PO  2/26/18 08:00


 3/28/18 08:59  2/27/18 08:25


0.4 MG


 


 Ferrous Sulfate


  (Feosol Tab)  325 mg  BIDM


 PO  2/25/18 17:00


 3/27/18 17:59  2/27/18 08:24


325 MG


 


 Pantoprazole


 Sodium


  (Protonix Tab)  40 mg  QAM


 PO  2/26/18 08:00


 3/28/18 08:59  2/27/18 08:23


40 MG


 


 Sodium Chloride  1,000 ml @ 


 100 mls/hr  Q10H


 IV  2/25/18 16:00


 3/27/18 15:59  2/26/18 21:42


100 MLS/HR


 


 Oxycodone/


 Acetaminophen


  (Percocet


 5-325mg Tab)  1 tab  Q4H  PRN


 PO  2/25/18 15:45


 3/11/18 15:44  2/27/18 08:16


1 TAB


 


 Miscellaneous


 Information


  (Consult)  1 ea  UD  PRN


 N/A  2/25/18 19:00


 3/27/18 18:59   


 


 


 Fluconazole/


 Sodium Chloride


 100 mg/Prmx  50 ml @ 


 100 mls/hr  Q24H


 IV  2/25/18 20:00


 3/7/18 19:59  2/26/18 21:38


100 MLS/HR


 


 Prednisone


  (PredniSONE TAB)  20 mg  DAILY


 PO  2/27/18 08:00


 3/28/18 08:59  2/27/18 08:22


20 MG


 


 Ondansetron HCl


  (Zofran Inj)  4 mg  ONE  PRN


 IV  2/26/18 15:15


     


 


 


 Promethazine HCl


 6.25 mg/Sodium


 Chloride  50.25 ml @ 


 202 mls/hr  ONE  PRN


 IV  2/26/18 15:15


     


 


 


 Daptomycin 400 mg/


 Syringe  8 ml @ 4


 mls/min  Q48H


 IV  2/26/18 16:00


 3/12/18 15:59  2/26/18 17:30


4 MLS/MIN


 


 Piperacillin Sod/


 Tazobactam Sod


 3.375 gm/Dextrose  115 ml @ 


 28.75 mls/


 hr  Q12H


 IV  2/27/18 14:00


 3/7/18 01:59   


 


 


 Oxybutynin


 Chloride


  (Ditropan Tab)  5 mg  TID


 PO  2/27/18 14:00


 3/29/18 13:59 UNV  


 











Objective


Vital Signs











  Date Time  Temp Pulse Resp B/P (MAP) Pulse Ox O2 Delivery O2 Flow Rate FiO2


 


2/27/18 07:07 36.3 69 17 130/69 (89) 96 Room Air  


 


2/27/18 03:54 36.7 79 20 151/80 (103) 96 Room Air  


 


2/27/18 00:00     97 Room Air  


 


2/26/18 23:20 36.5 73 18 125/76 (92) 95 Room Air  


 


2/26/18 20:23 36.4 68 20 124/75 (91) 96 Room Air  


 


2/26/18 20:23     97 Nasal Cannula 2.0 


 


2/26/18 19:33 36.3 59 20 160/81 (107) 97 Room Air  


 


2/26/18 17:15     99 Nasal Cannula 2.0 


 


2/26/18 17:00 36.2 63 14 148/90 99 Nasal Cannula 2 


 


2/26/18 16:50 36.2 66 14 130/86 98 Nasal Cannula 2 


 


2/26/18 16:40  56 14 141/75 98 Nasal Cannula 2 


 


2/26/18 16:32 36.2 56 14 139/75 98 Nasal Cannula 2 


 


2/26/18 14:41 36.5 58 18 106/65 (79) 95 Room Air  


 


2/26/18 14:34 36.4 62 20 110/64 (79) 95 Room Air  


 


2/26/18 09:15      Room Air  











Physical Exam


General Appearance:  WD/WN, no apparent distress


Respiratory/Chest:  chest non-tender, lungs clear, normal breath sounds, no 

respiratory distress, no accessory muscle use


Cardiovascular:  regular rate, rhythm, no edema, no gallop, no JVD


Abdomen:  soft, + tenderness (over suprapubic region), + pertinent finding (no 

flank tenderness)





Laboratory Results





Last 24 Hours








Test


  2/26/18


11:30 2/26/18


18:29 2/26/18


20:22 2/27/18


05:20


 


Prothrombin Time 12.5 SECONDS    


 


Prothromb Time International


Ratio 1.2 


  


  


  


 


 


White Blood Count  19.36 K/uL   17.18 K/uL 


 


Red Blood Count  3.18 M/uL   2.90 M/uL 


 


Hemoglobin  8.3 g/dL   7.8 g/dL 


 


Hematocrit  27.2 %   24.5 % 


 


Mean Corpuscular Volume  85.5 fL   84.5 fL 


 


Mean Corpuscular Hemoglobin  26.1 pg   26.9 pg 


 


Mean Corpuscular Hemoglobin


Concent 


  30.5 g/dl 


  


  31.8 g/dl 


 


 


RDW Standard Deviation  75.1 fL   73.3 fL 


 


RDW Coefficient of Variation  25.0 %   24.6 % 


 


Platelet Count  127 K/uL   112 K/uL 


 


Mean Platelet Volume  10.6 fL   10.5 fL 


 


Sodium Level  138 mmol/L   139 mmol/L 


 


Potassium Level  5.1 mmol/L   4.3 mmol/L 


 


Chloride Level  106 mmol/L   108 mmol/L 


 


Carbon Dioxide Level  23 mmol/L   22 mmol/L 


 


Anion Gap  8.0 mmol/L   9.0 mmol/L 


 


Blood Urea Nitrogen  58 mg/dl   56 mg/dl 


 


Creatinine  3.07 mg/dl   2.98 mg/dl 


 


Est Creatinine Clear Calc


Drug Dose 


  17.5 ml/min 


  


  18.1 ml/min 


 


 


Estimated GFR (


American) 


  21.6 


  


  22.4 


 


 


Estimated GFR (Non-


American 


  18.6 


  


  19.3 


 


 


BUN/Creatinine Ratio  18.8   18.9 


 


Random Glucose  139 mg/dl   108 mg/dl 


 


Calcium Level  8.0 mg/dl   7.9 mg/dl 


 


Bedside Glucose   131 mg/dl  


 


Test


  2/27/18


07:50 2/27/18


08:25 


  


 


 


Bedside Glucose 95 mg/dl    


 


White Blood Count  17.69 K/uL   


 


Red Blood Count  3.26 M/uL   


 


Hemoglobin  8.6 g/dL   


 


Hematocrit  27.6 %   


 


Mean Corpuscular Volume  84.7 fL   


 


Mean Corpuscular Hemoglobin  26.4 pg   


 


Mean Corpuscular Hemoglobin


Concent 


  31.2 g/dl 


  


  


 


 


Platelet Count  132 K/uL   


 


Mean Platelet Volume  10.6 fL   


 


Neutrophils (%) (Auto)  92.8 %   


 


Lymphocytes (%) (Auto)  4.5 %   


 


Monocytes (%) (Auto)  1.8 %   


 


Eosinophils (%) (Auto)  0.6 %   


 


Basophils (%) (Auto)  0.0 %   


 


Neutrophils # (Auto)  16.43 K/uL   


 


Lymphocytes # (Auto)  0.79 K/uL   


 


Monocytes # (Auto)  0.32 K/uL   


 


Eosinophils # (Auto)  0.10 K/uL   


 


Basophils # (Auto)  0.00 K/uL   


 


RDW Standard Deviation  74.1 fL   


 


RDW Coefficient of Variation  24.9 %   


 


Immature Granulocyte % (Auto)  0.3 %   


 


Immature Granulocyte # (Auto)  0.05 K/uL   











Assessment and Plan


Mr. Santos is a 77 year old male with a past medical history of rheumatoid 

arthritis on prednisone, BPH, Anemia of chronic disease, HTN, HLD, primary OA 

who was admitted for 12 hours of left-sided CVA pain and suprapubic pain on top 

of a 3 day hx of increasing sediment, pus, and orange color of urine in his 

indwelling catheter. Recent admission to [facility] from 2/2-14/18 for LINDA in setting 

of urinary retention secondary to worsening BPH.  During that admission he was 

found to have left sided hydroureteronephrosis. Was treated for complicated UTI 

with rocephin x 5 days. He had been seen by urology who had discussed 

possibility of inserting a stent but as he clinically improved, they decided to 

opt for outpatient follow up, which has not occurred yet. 





Pyelonephritis/Left VUJ obstruction/L Hydroureteronephrosis


- currently on daptomycin (thank you to ID for consult)


- d/c zosyn as urine culture shows staph aureus is pansensitive


- blood cultures positive x2 for staph aureus as well


- pt may need PICC line on d/c for extended abx coverage - will discuss w/ID


- CT showed moderate left hydroureteronephrosis with dilatation of the left 

ureter to the level the distal ureter. No ureteral calculi. Moderate left 

perinephric infiltration. 


- thank you to urology for consult


   - left ureteral stent placed yesterday


   - d/c fluconazole


   - 4-6 weeks oral cipro on d/c for suspected prostatitis


- Percocet prn q4h for pain management


- NaCl increased from 100 mL/hr to 150mls/hr





Acute Kidney Injury


- creatinine elevated to 2.98. Pt states baseline recently has been around 2, 

but this is far worse than prior relatively recent baseline of 1 in 2017


- creatinine has not improved since yesterday, but urine output improved


- Continue to hold amlodipine (held prior to admission)


- thank you to nephrology for consult


   - monitor serial BMP


   - outpatient metabolic stone eval once stable


   - contrast and abdominal CT/MRI for left kidney complex cyst after 

resolution of LINDA





Anemia


- likely anemia of chronic disease related to RA


- improved from 7.8 to 8.6 today


- will transfuse if Hgb drops below 7.5 as per nephro recommendations


- Type and screen ordered


- Continue iron supplementation - 325mg ferrous sulfate bid


- Had been given procrit at [facility] as well as 4 units of venofer





BPH, chronic x 15 years, worsening


- Continue flomax and finasteride


- Patient started on indwelling catheter at [facility]





RA


- Continue to hold leflunomide 2/2 recent weight loss


- Steroid taper from recent hospitalization - was taking 60mg on admission. 

Tapered back down to 5mg - his baseline


- continue folic acid





HTN


- continue Metoprolol 25 mg OD


- continue ASA 81 mg





DVT Prophylaxis: SCDs


Code: Full


Dispo: remains on med/surg





Resident Physician Supervision Note:





I interviewed and examined the patient. Discussed with the resident and agree 

with findings and plan as documented in the note. I also discussed the case 

with urology and nephrology. Any exceptions or clarifications are listed here: 

I my examination, the patient had a focal area of suprapubic tenderness. 





PLAN


1) Increase IVF to 150 cc/hr


2) Decrease Prednisone to 5 mg (his chronic dose). 


3) Will discuss antibiotics with ID and pharmacy. 





Documented By:  German Woody





Resident Tracking


Resident Involvement:  Resident Care Provided


Care Provided:  Adult Hospital Medicine

## 2018-02-27 NOTE — NEPHROLOGY PROGRESS NOTE
Nephrology Progress Note


Date of Service


Feb 27, 2018.





Chief Complaint


LINDA





Subjective


Mr. Santos was seen & examined in his hospital room this morning.  He tolerated 

cystoscopy w/ stent placement well without complication.  He has a alex 

catheter in place draining bloody urine.  Mr. Santos currently denies fever.  He 

reports that his flank pain is mildly improved.





Review of Systems


Constitutional:  No fever


Cardiovascular:  No chest pain


Respiratory:  No dyspnea at rest


Abdomen:  No pain, No nausea, No vomiting


Extremities:  No leg edema


A complete review of systems was performed.  Pertinent positives are noted 

above. All other systems are negative.





Vital Signs





Last 8 Hrs








  Date Time  Temp Pulse Resp B/P (MAP) Pulse Ox O2 Delivery O2 Flow Rate FiO2


 


2/27/18 09:25      Room Air  


 


2/27/18 08:15      Room Air  


 


2/27/18 07:07 36.3 69 17 130/69 (89) 96 Room Air  


 


2/27/18 03:54 36.7 79 20 151/80 (103) 96 Room Air  











Last Recorded Weight


Weight (Kilograms):  65.700





Physical Exam


General Appearance:  no apparent distress


Head:  normocephalic, atraumatic


Eyes:  PERRL, EOMI


Neck:  no adenopathy


Respiratory/Chest:  lungs clear, no respiratory distress


Cardiovascular:  regular rate, rhythm


Abdomen/GI:  normal bowel sounds, non tender, soft


Genitourinary - Male:  + pertinent finding (alex catheter w/ bloody urine)


Extremities/Musculoskelatal:  no calf tenderness, no pedal edema


Neurologic/Psych:  alert, oriented x 3





Family History





FH: CAD (coronary artery disease)


  BROTHER





Negative for CKD / ESRD





Social History


Smoking Status:  Former smoker


Marital Status:  


Housing Status:  lives with family


Occupation:  retired





.  Retired.  Formerly worked at [employer], later owned a 

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





Laboratory Results


Past 24 Hours


2/26/18 18:29








2/27/18 05:20








2/27/18 08:25








Red Blood Count 3.26, Mean Corpuscular Volume 84.7, Mean Corpuscular Hemoglobin 

26.4, Mean Corpuscular Hemoglobin Concent 31.2, Mean Platelet Volume 10.6, 

Neutrophils (%) (Auto) 92.8, Lymphocytes (%) (Auto) 4.5, Monocytes (%) (Auto) 

1.8, Eosinophils (%) (Auto) 0.6, Basophils (%) (Auto) 0.0, Neutrophils # (Auto) 

16.43, Lymphocytes # (Auto) 0.79, Monocytes # (Auto) 0.32, Eosinophils # (Auto) 

0.10, Basophils # (Auto) 0.00





2/26/18 18:29








2/27/18 05:20

















Test


  2/26/18


11:30 2/26/18


18:29 2/26/18


20:22 2/27/18


05:20


 


Prothrombin Time


  12.5 SECONDS


(9.0-12.0) 


  


  


 


 


Prothromb Time International


Ratio 1.2 (0.9-1.1) 


  


  


  


 


 


Red Blood Count


  


  3.18 M/uL


(4.7-6.1) 


  2.90 M/uL


(4.7-6.1)


 


Mean Corpuscular Volume


  


  85.5 fL


() 


  84.5 fL


()


 


Mean Corpuscular Hemoglobin


  


  26.1 pg


(25-34) 


  26.9 pg


(25-34)


 


Mean Corpuscular Hemoglobin


Concent 


  30.5 g/dl


(32-36) 


  31.8 g/dl


(32-36)


 


RDW Standard Deviation


  


  75.1 fL


(36.4-46.3) 


  73.3 fL


(36.4-46.3)


 


RDW Coefficient of Variation


  


  25.0 %


(11.5-14.5) 


  24.6 %


(11.5-14.5)


 


Mean Platelet Volume


  


  10.6 fL


(7.4-10.4) 


  10.5 fL


(7.4-10.4)


 


Anion Gap


  


  8.0 mmol/L


(3-11) 


  9.0 mmol/L


(3-11)


 


Est Creatinine Clear Calc


Drug Dose 


  17.5 ml/min 


  


  18.1 ml/min 


 


 


Estimated GFR (


American) 


  21.6 


  


  22.4 


 


 


Estimated GFR (Non-


American 


  18.6 


  


  19.3 


 


 


BUN/Creatinine Ratio  18.8 (10-20)   18.9 (10-20) 


 


Calcium Level


  


  8.0 mg/dl


(8.5-10.1) 


  7.9 mg/dl


(8.5-10.1)


 


Bedside Glucose


  


  


  131 mg/dl


(70-99) 


 


 


Test


  2/27/18


07:50 2/27/18


08:25 


  


 


 


Bedside Glucose


  95 mg/dl


(70-99) 


  


  


 


 


White Blood Count


  


  17.69 K/uL


(4.8-10.8) 


  


 


 


Red Blood Count


  


  3.26 M/uL


(4.7-6.1) 


  


 


 


Hemoglobin


  


  8.6 g/dL


(14.0-18.0) 


  


 


 


Hematocrit  27.6 % (42-52)   


 


Mean Corpuscular Volume


  


  84.7 fL


() 


  


 


 


Mean Corpuscular Hemoglobin


  


  26.4 pg


(25-34) 


  


 


 


Mean Corpuscular Hemoglobin


Concent 


  31.2 g/dl


(32-36) 


  


 


 


Platelet Count


  


  132 K/uL


(130-400) 


  


 


 


Mean Platelet Volume


  


  10.6 fL


(7.4-10.4) 


  


 


 


Neutrophils (%) (Auto)  92.8 %   


 


Lymphocytes (%) (Auto)  4.5 %   


 


Monocytes (%) (Auto)  1.8 %   


 


Eosinophils (%) (Auto)  0.6 %   


 


Basophils (%) (Auto)  0.0 %   


 


Neutrophils # (Auto)


  


  16.43 K/uL


(1.4-6.5) 


  


 


 


Lymphocytes # (Auto)


  


  0.79 K/uL


(1.2-3.4) 


  


 


 


Monocytes # (Auto)


  


  0.32 K/uL


(0.11-0.59) 


  


 


 


Eosinophils # (Auto)


  


  0.10 K/uL


(0-0.5) 


  


 


 


Basophils # (Auto)


  


  0.00 K/uL


(0-0.2) 


  


 


 


RDW Standard Deviation


  


  74.1 fL


(36.4-46.3) 


  


 


 


RDW Coefficient of Variation


  


  24.9 %


(11.5-14.5) 


  


 


 


Immature Granulocyte % (Auto)  0.3 %   


 


Immature Granulocyte # (Auto)


  


  0.05 K/uL


(0.00-0.02) 


  


 


 


Anisocytosis  PRESENT   


 


Microcytosis  PRESENT   


 


Echinocytes  1+   











Allergies


Coded Allergies:  


     Lisinopril (Verified  Adverse Reaction, Mild, COUGH, 12/13/17)





Medications





Current Inpatient Medications








 Medications


  (Trade)  Dose


 Ordered  Sig/Juaquin


 Route  Start Time


 Stop Time Status Last Admin


Dose Admin


 


 Acetaminophen


  (Tylenol Tab)  650 mg  Q4H  PRN


 PO  2/25/18 15:45


 3/27/18 15:44   


 


 


 Al Hydrox/Mg


 Hydrox/Simethicone


  (Maalox Max Susp)  15 ml  Q4H  PRN


 PO  2/25/18 15:45


 3/27/18 15:44   


 


 


 Magnesium


 Hydroxide


  (Milk Of


 Magnesia Susp)  30 ml  Q6H  PRN


 PO  2/25/18 15:45


 3/27/18 15:44   


 


 


 Polyethylene


  (Miralax Powder


 Packet)  17 gm  DAILY  PRN


 PO  2/25/18 15:45


 3/27/18 15:44   


 


 


 Ondansetron HCl


  (Zofran Inj)  4 mg  Q6H  PRN


 IV  2/25/18 15:45


 3/27/18 15:44   


 


 


 Aspirin


  (Ecotrin Tab)  81 mg  BID


 PO  2/25/18 20:00


 3/27/18 20:59  2/27/18 08:25


81 MG


 


 Finasteride


  (Proscar Tab)  5 mg  HS


 PO  2/25/18 21:00


 3/27/18 20:59  2/26/18 21:39


5 MG


 


 Folic Acid


  (Folvite Tab)  1 mg  QAM


 PO  2/26/18 08:00


 3/28/18 08:59  2/27/18 08:25


1 MG


 


 Metoprolol


 Tartrate


  (Lopressor Tab)  25 mg  DAILY


 PO  2/26/18 08:00


 3/28/18 08:59  2/27/18 08:24


25 MG


 


 Multivitamins


  (Multivitamin


 Tab)  1 tab  QAM


 PO  2/26/18 08:00


 3/28/18 08:59  2/27/18 08:23


1 TAB


 


 Potassium Chloride


  (Klor-Con Tab)  20 meq  BID


 PO  2/25/18 20:00


 3/27/18 20:59  2/27/18 08:25


20 MEQ


 


 Ranitidine HCl


  (zANTac TAB)  150 mg  BID


 PO  2/25/18 20:00


 3/27/18 20:59  2/27/18 08:23


150 MG


 


 Tamsulosin HCl


  (Flomax Cap)  0.4 mg  DAILY


 PO  2/26/18 08:00


 3/28/18 08:59  2/27/18 08:25


0.4 MG


 


 Ferrous Sulfate


  (Feosol Tab)  325 mg  BIDM


 PO  2/25/18 17:00


 3/27/18 17:59  2/27/18 08:24


325 MG


 


 Pantoprazole


 Sodium


  (Protonix Tab)  40 mg  QAM


 PO  2/26/18 08:00


 3/28/18 08:59  2/27/18 08:23


40 MG


 


 Sodium Chloride  1,000 ml @ 


 100 mls/hr  Q10H


 IV  2/25/18 16:00


 3/27/18 15:59  2/27/18 09:58


100 MLS/HR


 


 Oxycodone/


 Acetaminophen


  (Percocet


 5-325mg Tab)  1 tab  Q4H  PRN


 PO  2/25/18 15:45


 3/11/18 15:44  2/27/18 08:16


1 TAB


 


 Miscellaneous


 Information


  (Consult)  1 ea  UD  PRN


 N/A  2/25/18 19:00


 3/27/18 18:59   


 


 


 Fluconazole/


 Sodium Chloride


 100 mg/Prmx  50 ml @ 


 100 mls/hr  Q24H


 IV  2/25/18 20:00


 3/7/18 19:59  2/26/18 21:38


100 MLS/HR


 


 Prednisone


  (PredniSONE TAB)  20 mg  DAILY


 PO  2/27/18 08:00


 3/28/18 08:59  2/27/18 08:22


20 MG


 


 Daptomycin 400 mg/


 Syringe  8 ml @ 4


 mls/min  Q48H


 IV  2/26/18 16:00


 3/12/18 15:59  2/26/18 17:30


4 MLS/MIN


 


 Piperacillin Sod/


 Tazobactam Sod


 3.375 gm/Dextrose  115 ml @ 


 28.75 mls/


 hr  Q12H


 IV  2/27/18 14:00


 3/7/18 01:59   


 


 


 Oxybutynin


 Chloride


  (Ditropan Tab)  5 mg  TID


 PO  2/27/18 14:00


 3/29/18 13:59   


 











Impression





(1) Acute renal failure


(2) Hydronephrosis


(3) Kidney stones


(4) Pyelonephritis


(5) BPH (benign prostatic hyperplasia)


(6) Renal cyst


(7) Chronic anemia





Recommendations


ACUTE KIDNEY INJURY:


-- Urology operative note reviewed today.  Patient underwent cystoscopy w/ L 

ureteral stent placement yesterday.  Purulent urine obtained from ureteral 

stent following placement


-- Kidney function is unchanged but electrolyte balance is acceptable.  Urine 

output has improved.





ANEMIA:


-- INR is 1.2.  Hgb has improved to 8.6





ID:


-- Blood & urine cultures are + for Staph Aureus sensitive to Daptomycin 


-- Recommend narrowing antibiotic spectrum.  Pharmacy is assisting w/ drug 

dosing due to LINDA.  


-- Await further ID input





RHEUM:


-- Patient has a h/o RA treated w/ Leflunomide and Prednisone therapy


-- Recommend tapering Prednisone therapy to 5 - 10 mg per day





KIDNEY STONES:


-- Will benefit from outpatient metabolic stone evaluation once medically stable





KIDNEY CYSTS:


-- L kidney has complex cyst in the lower pole.  Patient will likely require 

contrast + abdominal CT or MRI once kidney function recovers

## 2018-02-27 NOTE — ANESTHESIOLOGY PROGRESS NOTE
Anesthesia Post Op Note


Date & Time


Feb 27, 2018 at 11:07





Vital Signs


Pain Intensity:  8.0





Vital Signs Past 12 Hours








  Date Time  Temp Pulse Resp B/P (MAP) Pulse Ox O2 Delivery O2 Flow Rate FiO2


 


2/27/18 09:25      Room Air  


 


2/27/18 08:15      Room Air  


 


2/27/18 07:07 36.3 69 17 130/69 (89) 96 Room Air  


 


2/27/18 03:54 36.7 79 20 151/80 (103) 96 Room Air  


 


2/27/18 00:00     97 Room Air  


 


2/26/18 23:20 36.5 73 18 125/76 (92) 95 Room Air  











Notes


Mental Status:  alert / awake / arousable, participated in evaluation


Pt Amnestic to Procedure:  Yes


Nausea / Vomiting:  adequately controlled


Pain:  adequately controlled


Airway Patency, RR, SpO2:  stable & adequate


BP & HR:  stable & adequate


Hydration State:  stable & adequate


Anesthetic Complications:  no major complications apparent


Awake, pain improved with post op pain medication. Satisfied with anesthesia 

care. VSS.

## 2018-02-28 VITALS — HEART RATE: 50 BPM | DIASTOLIC BLOOD PRESSURE: 76 MMHG | TEMPERATURE: 98.06 F | SYSTOLIC BLOOD PRESSURE: 136 MMHG

## 2018-02-28 VITALS — DIASTOLIC BLOOD PRESSURE: 75 MMHG | HEART RATE: 53 BPM | SYSTOLIC BLOOD PRESSURE: 155 MMHG | TEMPERATURE: 97.88 F

## 2018-02-28 VITALS
SYSTOLIC BLOOD PRESSURE: 188 MMHG | OXYGEN SATURATION: 96 % | DIASTOLIC BLOOD PRESSURE: 83 MMHG | HEART RATE: 74 BPM | TEMPERATURE: 98.06 F

## 2018-02-28 VITALS — SYSTOLIC BLOOD PRESSURE: 145 MMHG | HEART RATE: 59 BPM | DIASTOLIC BLOOD PRESSURE: 73 MMHG | TEMPERATURE: 98.06 F

## 2018-02-28 VITALS — SYSTOLIC BLOOD PRESSURE: 155 MMHG | DIASTOLIC BLOOD PRESSURE: 78 MMHG | TEMPERATURE: 98.06 F | HEART RATE: 46 BPM

## 2018-02-28 VITALS — TEMPERATURE: 97.52 F | SYSTOLIC BLOOD PRESSURE: 150 MMHG | DIASTOLIC BLOOD PRESSURE: 80 MMHG | HEART RATE: 62 BPM

## 2018-02-28 VITALS — TEMPERATURE: 97.16 F | OXYGEN SATURATION: 91 % | HEART RATE: 75 BPM

## 2018-02-28 VITALS — DIASTOLIC BLOOD PRESSURE: 84 MMHG | SYSTOLIC BLOOD PRESSURE: 165 MMHG

## 2018-02-28 VITALS
SYSTOLIC BLOOD PRESSURE: 155 MMHG | HEART RATE: 78 BPM | TEMPERATURE: 98.06 F | OXYGEN SATURATION: 97 % | DIASTOLIC BLOOD PRESSURE: 70 MMHG

## 2018-02-28 LAB
BASOPHILS # BLD: 0 K/UL (ref 0–0.2)
BASOPHILS NFR BLD: 0 %
BUN SERPL-MCNC: 42 MG/DL (ref 7–18)
CALCIUM SERPL-MCNC: 8 MG/DL (ref 8.5–10.1)
CO2 SERPL-SCNC: 21 MMOL/L (ref 21–32)
CREAT SERPL-MCNC: 2.52 MG/DL (ref 0.6–1.4)
EOS ABS #: 0.06 K/UL (ref 0–0.5)
EOSINOPHIL NFR BLD AUTO: 97 K/UL (ref 130–400)
GLUCOSE SERPL-MCNC: 94 MG/DL (ref 70–99)
HCT VFR BLD CALC: 22.9 % (ref 42–52)
HGB BLD-MCNC: 7.2 G/DL (ref 14–18)
IG#: 0.01 K/UL (ref 0–0.02)
IMM GRANULOCYTES NFR BLD AUTO: 5.7 %
LYMPHOCYTES # BLD: 0.48 K/UL (ref 1.2–3.4)
MCH RBC QN AUTO: 26.7 PG (ref 25–34)
MCHC RBC AUTO-ENTMCNC: 31.4 G/DL (ref 32–36)
MCV RBC AUTO: 84.8 FL (ref 80–100)
MONO ABS #: 0.31 K/UL (ref 0.11–0.59)
MONOCYTES NFR BLD: 3.7 %
NEUT ABS #: 7.54 K/UL (ref 1.4–6.5)
NEUTROPHILS # BLD AUTO: 0.7 %
NEUTROPHILS NFR BLD AUTO: 89.8 %
PMV BLD AUTO: 10.7 FL (ref 7.4–10.4)
POTASSIUM SERPL-SCNC: 4.1 MMOL/L (ref 3.5–5.1)
RED CELL DISTRIBUTION WIDTH CV: 24.8 % (ref 11.5–14.5)
RED CELL DISTRIBUTION WIDTH SD: 73.7 FL (ref 36.4–46.3)
SODIUM SERPL-SCNC: 140 MMOL/L (ref 136–145)
WBC # BLD AUTO: 8.4 K/UL (ref 4.8–10.8)

## 2018-02-28 RX ADMIN — DAPTOMYCIN SCH MLS/MIN: 50 INJECTION, POWDER, LYOPHILIZED, FOR SOLUTION INTRAVENOUS at 15:33

## 2018-02-28 RX ADMIN — OXYCODONE HYDROCHLORIDE AND ACETAMINOPHEN PRN TAB: 5; 325 TABLET ORAL at 22:18

## 2018-02-28 RX ADMIN — Medication SCH MG: at 08:09

## 2018-02-28 RX ADMIN — PANTOPRAZOLE SCH MG: 40 TABLET, DELAYED RELEASE ORAL at 08:10

## 2018-02-28 RX ADMIN — SODIUM CHLORIDE SCH MLS/HR: 900 INJECTION, SOLUTION INTRAVENOUS at 06:00

## 2018-02-28 RX ADMIN — FINASTERIDE SCH MG: 5 TABLET, FILM COATED ORAL at 20:55

## 2018-02-28 RX ADMIN — RANITIDINE SCH MG: 150 TABLET ORAL at 08:10

## 2018-02-28 RX ADMIN — Medication SCH TAB: at 08:10

## 2018-02-28 RX ADMIN — RANITIDINE SCH MG: 150 TABLET ORAL at 20:55

## 2018-02-28 RX ADMIN — SODIUM CHLORIDE SCH MLS/HR: 900 INJECTION, SOLUTION INTRAVENOUS at 16:58

## 2018-02-28 RX ADMIN — METOPROLOL TARTRATE SCH MG: 25 TABLET, FILM COATED ORAL at 08:10

## 2018-02-28 RX ADMIN — OXYBUTYNIN CHLORIDE SCH MG: 5 TABLET ORAL at 13:08

## 2018-02-28 RX ADMIN — OXYCODONE HYDROCHLORIDE AND ACETAMINOPHEN PRN TAB: 5; 325 TABLET ORAL at 06:05

## 2018-02-28 RX ADMIN — POTASSIUM CHLORIDE SCH MEQ: 1500 TABLET, EXTENDED RELEASE ORAL at 20:55

## 2018-02-28 RX ADMIN — POTASSIUM CHLORIDE SCH MEQ: 1500 TABLET, EXTENDED RELEASE ORAL at 08:10

## 2018-02-28 RX ADMIN — TAMSULOSIN HYDROCHLORIDE SCH MG: 0.4 CAPSULE ORAL at 08:09

## 2018-02-28 RX ADMIN — SODIUM CHLORIDE SCH MLS/HR: 900 INJECTION, SOLUTION INTRAVENOUS at 18:41

## 2018-02-28 RX ADMIN — OXYBUTYNIN CHLORIDE SCH MG: 5 TABLET ORAL at 20:55

## 2018-02-28 RX ADMIN — OXYCODONE HYDROCHLORIDE AND ACETAMINOPHEN PRN TAB: 5; 325 TABLET ORAL at 16:57

## 2018-02-28 RX ADMIN — FERROUS SULFATE TAB EC 325 MG (65 MG FE EQUIVALENT) SCH MG: 325 (65 FE) TABLET DELAYED RESPONSE at 16:58

## 2018-02-28 RX ADMIN — OXYBUTYNIN CHLORIDE SCH MG: 5 TABLET ORAL at 08:09

## 2018-02-28 RX ADMIN — FERROUS SULFATE TAB EC 325 MG (65 MG FE EQUIVALENT) SCH MG: 325 (65 FE) TABLET DELAYED RESPONSE at 08:09

## 2018-02-28 NOTE — NEPHROLOGY PROGRESS NOTE
Nephrology Progress Note


Date of Service


Feb 28, 2018.





Chief Complaint


LINDA





Subjective


Mr. Santos was seen & examined in his hospital room this morning.  He complains 

of suprapubic pain.  He has had gross hematuria.  He currently denies fever or 

flank pain.





Review of Systems


Constitutional:  No fever


Cardiovascular:  No chest pain


Respiratory:  No dyspnea at rest


Abdomen:  No nausea, No vomiting


Extremities:  No leg edema


A complete review of systems was performed.  Pertinent positives are noted 

above. All other systems are negative.





Vital Signs





Last 8 Hrs








  Date Time  Temp Pulse Resp B/P (MAP) Pulse Ox O2 Delivery O2 Flow Rate FiO2


 


2/28/18 09:50      Room Air  


 


2/28/18 07:25 36.7 78 20 155/70 (98) 97   











Last Recorded Weight


Weight (Kilograms):  65.700





Physical Exam


General Appearance:  no apparent distress


Head:  normocephalic, atraumatic


Eyes:  PERRL, EOMI


Neck:  no adenopathy


Respiratory/Chest:  lungs clear, no respiratory distress


Cardiovascular:  regular rate, rhythm


Abdomen/GI:  normal bowel sounds, soft


Genitourinary - Male:  + pertinent finding (alex catheter in place draining 

grossly bloody urine)


Extremities/Musculoskelatal:  no calf tenderness, no pedal edema


Neurologic/Psych:  alert, oriented x 3





Family History





FH: CAD (coronary artery disease)


  BROTHER





Negative for CKD / ESRD





Social History


Smoking Status:  Former smoker


Marital Status:  


Housing Status:  lives with family


Occupation:  retired





.  Retired.  Formerly worked at [employer], later owned a 

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





Laboratory Results


Past 24 Hours


2/28/18 06:55








Red Blood Count 2.70, Mean Corpuscular Volume 84.8, Mean Corpuscular Hemoglobin 

26.7, Mean Corpuscular Hemoglobin Concent 31.4, Mean Platelet Volume 10.7, 

Neutrophils (%) (Auto) 89.8, Lymphocytes (%) (Auto) 5.7, Monocytes (%) (Auto) 

3.7, Eosinophils (%) (Auto) 0.7, Basophils (%) (Auto) 0.0, Neutrophils # (Auto) 

7.54, Lymphocytes # (Auto) 0.48, Monocytes # (Auto) 0.31, Eosinophils # (Auto) 

0.06, Basophils # (Auto) 0.00





2/28/18 06:55

















Test


  2/27/18


11:33 2/27/18


16:51 2/27/18


20:55 2/28/18


06:55


 


Bedside Glucose


  


  156 mg/dl


(70-99) 149 mg/dl


(70-99) 


 


 


White Blood Count


  


  


  


  8.40 K/uL


(4.8-10.8)


 


Red Blood Count


  


  


  


  2.70 M/uL


(4.7-6.1)


 


Hemoglobin


  


  


  


  7.2 g/dL


(14.0-18.0)


 


Hematocrit    22.9 % (42-52) 


 


Mean Corpuscular Volume


  


  


  


  84.8 fL


()


 


Mean Corpuscular Hemoglobin


  


  


  


  26.7 pg


(25-34)


 


Mean Corpuscular Hemoglobin


Concent 


  


  


  31.4 g/dl


(32-36)


 


Platelet Count


  


  


  


  97 K/uL


(130-400)


 


Mean Platelet Volume


  


  


  


  10.7 fL


(7.4-10.4)


 


Neutrophils (%) (Auto)    89.8 % 


 


Lymphocytes (%) (Auto)    5.7 % 


 


Monocytes (%) (Auto)    3.7 % 


 


Eosinophils (%) (Auto)    0.7 % 


 


Basophils (%) (Auto)    0.0 % 


 


Neutrophils # (Auto)


  


  


  


  7.54 K/uL


(1.4-6.5)


 


Lymphocytes # (Auto)


  


  


  


  0.48 K/uL


(1.2-3.4)


 


Monocytes # (Auto)


  


  


  


  0.31 K/uL


(0.11-0.59)


 


Eosinophils # (Auto)


  


  


  


  0.06 K/uL


(0-0.5)


 


Basophils # (Auto)


  


  


  


  0.00 K/uL


(0-0.2)


 


RDW Standard Deviation


  


  


  


  73.7 fL


(36.4-46.3)


 


RDW Coefficient of Variation


  


  


  


  24.8 %


(11.5-14.5)


 


Immature Granulocyte % (Auto)    0.1 % 


 


Immature Granulocyte # (Auto)


  


  


  


  0.01 K/uL


(0.00-0.02)


 


Platelet Estimate    DECREASED 


 


Anisocytosis    PRESENT 


 


Echinocytes    1+ 


 


Anion Gap


  


  


  


  7.0 mmol/L


(3-11)


 


Est Creatinine Clear Calc


Drug Dose 


  


  


  21.4 ml/min 


 


 


Estimated GFR (


American) 


  


  


  27.4 


 


 


Estimated GFR (Non-


American 


  


  


  23.6 


 


 


BUN/Creatinine Ratio    16.8 (10-20) 


 


Calcium Level


  


  


  


  8.0 mg/dl


(8.5-10.1)


 


Test


  2/28/18


07:34 


  


  


 


 


Bedside Glucose


  138 mg/dl


(70-99) 


  


  


 











Allergies


Coded Allergies:  


     Lisinopril (Verified  Adverse Reaction, Mild, COUGH, 12/13/17)





Medications





Current Inpatient Medications








 Medications


  (Trade)  Dose


 Ordered  Sig/Juaquin


 Route  Start Time


 Stop Time Status Last Admin


Dose Admin


 


 Acetaminophen


  (Tylenol Tab)  650 mg  Q4H  PRN


 PO  2/25/18 15:45


 3/27/18 15:44   


 


 


 Al Hydrox/Mg


 Hydrox/Simethicone


  (Maalox Max Susp)  15 ml  Q4H  PRN


 PO  2/25/18 15:45


 3/27/18 15:44   


 


 


 Magnesium


 Hydroxide


  (Milk Of


 Magnesia Susp)  30 ml  Q6H  PRN


 PO  2/25/18 15:45


 3/27/18 15:44   


 


 


 Polyethylene


  (Miralax Powder


 Packet)  17 gm  DAILY  PRN


 PO  2/25/18 15:45


 3/27/18 15:44   


 


 


 Ondansetron HCl


  (Zofran Inj)  4 mg  Q6H  PRN


 IV  2/25/18 15:45


 3/27/18 15:44   


 


 


 Aspirin


  (Ecotrin Tab)  81 mg  BID


 PO  2/25/18 20:00


 3/27/18 20:59  2/28/18 08:09


81 MG


 


 Finasteride


  (Proscar Tab)  5 mg  HS


 PO  2/25/18 21:00


 3/27/18 20:59  2/27/18 19:55


5 MG


 


 Folic Acid


  (Folvite Tab)  1 mg  QAM


 PO  2/26/18 08:00


 3/28/18 08:59  2/28/18 08:10


1 MG


 


 Metoprolol


 Tartrate


  (Lopressor Tab)  25 mg  DAILY


 PO  2/26/18 08:00


 3/28/18 08:59  2/28/18 08:10


25 MG


 


 Multivitamins


  (Multivitamin


 Tab)  1 tab  QAM


 PO  2/26/18 08:00


 3/28/18 08:59  2/28/18 08:10


1 TAB


 


 Potassium Chloride


  (Klor-Con Tab)  20 meq  BID


 PO  2/25/18 20:00


 3/27/18 20:59  2/28/18 08:10


20 MEQ


 


 Ranitidine HCl


  (zANTac TAB)  150 mg  BID


 PO  2/25/18 20:00


 3/27/18 20:59  2/28/18 08:10


150 MG


 


 Tamsulosin HCl


  (Flomax Cap)  0.4 mg  DAILY


 PO  2/26/18 08:00


 3/28/18 08:59  2/28/18 08:09


0.4 MG


 


 Ferrous Sulfate


  (Feosol Tab)  325 mg  BIDM


 PO  2/25/18 17:00


 3/27/18 17:59  2/28/18 08:09


325 MG


 


 Pantoprazole


 Sodium


  (Protonix Tab)  40 mg  QAM


 PO  2/26/18 08:00


 3/28/18 08:59  2/28/18 08:10


40 MG


 


 Sodium Chloride  1,000 ml @ 


 75 mls/hr  [id number]


 IV  2/25/18 16:00


 3/27/18 15:59  2/28/18 06:00


150 MLS/HR


 


 Oxycodone/


 Acetaminophen


  (Percocet


 5-325mg Tab)  1 tab  Q4H  PRN


 PO  2/25/18 15:45


 3/11/18 15:44  2/28/18 06:05


1 TAB


 


 Daptomycin 400 mg/


 Syringe  8 ml @ 4


 mls/min  Q48H


 IV  2/26/18 16:00


 3/12/18 15:59  2/26/18 17:30


4 MLS/MIN


 


 Oxybutynin


 Chloride


  (Ditropan Tab)  5 mg  TID


 PO  2/27/18 14:00


 3/29/18 13:59  2/28/18 08:09


5 MG


 


 Prednisone


  (PredniSONE TAB)  5 mg  DAILY


 PO  2/28/18 08:00


 3/28/18 08:59  2/28/18 08:10


5 MG











Impression





(1) Acute renal failure


(2) Hydronephrosis


(3) Kidney stones


(4) Pyelonephritis


(5) BPH (benign prostatic hyperplasia)


(6) Renal cyst


(7) Chronic anemia





Recommendations


ACUTE KIDNEY INJURY:


-- BPH resulting in L ureteral obstruction s/p cystoscopy w/ stenting 02/27


-- Kidney function is mildly improved,  electrolyte balance is acceptable.  

Urine output has matched IVF.  Will continue gentle hydration





ANEMIA:


-- INR is 1.2.  Hgb is trending down.  Consider transfusion to maintain Hgb > 

8.0





ID:


-- Blood & urine cultures are + for Staph Aureus sensitive to Daptomycin 


-- Await further ID input





RHEUM:


-- Patient has a h/o RA treated w/ Leflunomide and Prednisone therapy


-- Prednisone has been reduced to 5 mg daily





KIDNEY STONES:


-- Will benefit from outpatient metabolic stone evaluation once medically stable





KIDNEY CYSTS:


-- L kidney has complex cyst in the lower pole.  Patient will likely require 

contrast + abdominal CT or MRI once kidney function recovers

## 2018-02-28 NOTE — PROGRESS NOTE
Subjective


Date of Service:


Feb 28, 2018.


Subjective


Pt evaluation today including:  conversation w/ patient, chart review, lab 

review


Voiding:  alex catheter in place (patent, draining yellow urine )


76 yo male s/p left ureteral stent placement. 


Pt reports his bladder pain has improved since being irrigated for a clot 

yesterday afternoon. 


Cr improved slightly to 2.52 this morning. 


H&H noted to be 7.2 and 22.9.





Problem List


Medical Problems:


(1) Acute renal failure


Status: Acute  





(2) Leukocytosis


Status: Acute  





(3) Urinary tract infection


Status: Acute  











Review of Systems


Constitutional:  No fever, No chills


Respiratory:  No shortness of breath


Cardiac:  No chest pain


Abdomen:  No pain, No nausea, No vomiting


Heme:  No abnormal bleeding/bruising





Objective


Vital Signs











  Date Time  Temp Pulse Resp B/P (MAP) Pulse Ox O2 Delivery O2 Flow Rate FiO2


 


2/28/18 07:25 36.7 78 20 155/70 (98) 97   


 


2/28/18 00:00      Room Air  


 


2/27/18 23:39 36.4 69 20 158/94 (115) 95 Room Air  


 


2/27/18 20:00      Room Air  


 


2/27/18 15:55 36.4 68 22 152/78 (102) 98 Room Air  


 


2/27/18 09:25      Room Air  











Physical Exam


General Appearance:  no apparent distress


Eyes:  normal inspection


ENT:  hearing grossly normal


Neck:  no JVD


Respiratory/Chest:  no respiratory distress, no accessory muscle use


Cardiovascular:  no JVD


Extremities:  normal inspection


Neurologic/Psychiatric:  alert, normal mood/affect, oriented x 3


Skin:  normal color





Laboratory Results





Last 24 Hours








Test


  2/27/18


11:33 2/27/18


16:51 2/27/18


20:55 2/28/18


06:55


 


Bedside Glucose  156 mg/dl  149 mg/dl  


 


White Blood Count    8.40 K/uL 


 


Red Blood Count    2.70 M/uL 


 


Hemoglobin    7.2 g/dL 


 


Hematocrit    22.9 % 


 


Mean Corpuscular Volume    84.8 fL 


 


Mean Corpuscular Hemoglobin    26.7 pg 


 


Mean Corpuscular Hemoglobin


Concent 


  


  


  31.4 g/dl 


 


 


Platelet Count    97 K/uL 


 


Mean Platelet Volume    10.7 fL 


 


Neutrophils (%) (Auto)    89.8 % 


 


Lymphocytes (%) (Auto)    5.7 % 


 


Monocytes (%) (Auto)    3.7 % 


 


Eosinophils (%) (Auto)    0.7 % 


 


Basophils (%) (Auto)    0.0 % 


 


Neutrophils # (Auto)    7.54 K/uL 


 


Lymphocytes # (Auto)    0.48 K/uL 


 


Monocytes # (Auto)    0.31 K/uL 


 


Eosinophils # (Auto)    0.06 K/uL 


 


Basophils # (Auto)    0.00 K/uL 


 


RDW Standard Deviation    73.7 fL 


 


RDW Coefficient of Variation    24.8 % 


 


Immature Granulocyte % (Auto)    0.1 % 


 


Immature Granulocyte # (Auto)    0.01 K/uL 


 


Platelet Estimate    DECREASED 


 


Anisocytosis    PRESENT 


 


Echinocytes    1+ 


 


Sodium Level    140 mmol/L 


 


Potassium Level    4.1 mmol/L 


 


Chloride Level    112 mmol/L 


 


Carbon Dioxide Level    21 mmol/L 


 


Anion Gap    7.0 mmol/L 


 


Blood Urea Nitrogen    42 mg/dl 


 


Creatinine    2.52 mg/dl 


 


Est Creatinine Clear Calc


Drug Dose 


  


  


  21.4 ml/min 


 


 


Estimated GFR (


American) 


  


  


  27.4 


 


 


Estimated GFR (Non-


American 


  


  


  23.6 


 


 


BUN/Creatinine Ratio    16.8 


 


Random Glucose    94 mg/dl 


 


Calcium Level    8.0 mg/dl 


 


Test


  2/28/18


07:34 


  


  


 


 


Bedside Glucose 138 mg/dl    











Assessment and Plan


POD #2 s/p cysto and left ureteral stent placement, sepsis





AFVSS. 


Bladder pain improved. 


Will continue alex catheter for now. Plan for outpatient TURP once infection 

adequately treated. Will arrange for outpatient f/u with Dr. Cm. 


Management of IV abx per Dr. Santos for UTI. Suspect prostatitis and would 

transition to 3 weeks of oral Bactrim once finished with the IV abx. 


Discussed this morning's H&H with hospitalist service. Will allow to manage. 


No further  management at this time. Will sign off for now. Recall PRN  

issues.

## 2018-02-28 NOTE — FAMILY MEDICINE PROGRESS NOTE
Progress Note


Date of Service


Feb 28, 2018.





Subjective


Pt evaluation today including:  conversation w/ patient, physical exam, chart 

review, lab review, review of inpatient medication list


Pain:  Suprapubic pain noted


PO Intake:  Tolerating PO intake


Voiding:  alex catheter in place


Mr. Santos reports his suprapubic pain is slowly resolving. He states he still 

feels twinges of pain every so often but that they are not as severe as they 

were before. He denies fever, chills,  n/v, chest pain or SOB. He is able to 

ambulate without dizziness.





   Constitutional:  No fever, No chills


   Respiratory:  No cough, No sputum, No shortness of breath


   Cardiovascular:  No chest pain


   Abdomen:  + pain, No nausea, No vomiting


   Male :  + hematuria


   All Other Systems:  Reviewed and Negative





Medications





Current Inpatient Medications








 Medications


  (Trade)  Dose


 Ordered  Sig/Juaquin


 Route  Start Time


 Stop Time Status Last Admin


Dose Admin


 


 Acetaminophen


  (Tylenol Tab)  650 mg  Q4H  PRN


 PO  2/25/18 15:45


 3/27/18 15:44   


 


 


 Al Hydrox/Mg


 Hydrox/Simethicone


  (Maalox Max Susp)  15 ml  Q4H  PRN


 PO  2/25/18 15:45


 3/27/18 15:44   


 


 


 Magnesium


 Hydroxide


  (Milk Of


 Magnesia Susp)  30 ml  Q6H  PRN


 PO  2/25/18 15:45


 3/27/18 15:44   


 


 


 Polyethylene


  (Miralax Powder


 Packet)  17 gm  DAILY  PRN


 PO  2/25/18 15:45


 3/27/18 15:44   


 


 


 Ondansetron HCl


  (Zofran Inj)  4 mg  Q6H  PRN


 IV  2/25/18 15:45


 3/27/18 15:44   


 


 


 Aspirin


  (Ecotrin Tab)  81 mg  BID


 PO  2/25/18 20:00


 3/27/18 20:59  2/28/18 08:09


81 MG


 


 Finasteride


  (Proscar Tab)  5 mg  HS


 PO  2/25/18 21:00


 3/27/18 20:59  2/27/18 19:55


5 MG


 


 Folic Acid


  (Folvite Tab)  1 mg  QAM


 PO  2/26/18 08:00


 3/28/18 08:59  2/28/18 08:10


1 MG


 


 Metoprolol


 Tartrate


  (Lopressor Tab)  25 mg  DAILY


 PO  2/26/18 08:00


 3/28/18 08:59  2/28/18 08:10


25 MG


 


 Multivitamins


  (Multivitamin


 Tab)  1 tab  QAM


 PO  2/26/18 08:00


 3/28/18 08:59  2/28/18 08:10


1 TAB


 


 Potassium Chloride


  (Klor-Con Tab)  20 meq  BID


 PO  2/25/18 20:00


 3/27/18 20:59  2/28/18 08:10


20 MEQ


 


 Ranitidine HCl


  (zANTac TAB)  150 mg  BID


 PO  2/25/18 20:00


 3/27/18 20:59  2/28/18 08:10


150 MG


 


 Tamsulosin HCl


  (Flomax Cap)  0.4 mg  DAILY


 PO  2/26/18 08:00


 3/28/18 08:59  2/28/18 08:09


0.4 MG


 


 Ferrous Sulfate


  (Feosol Tab)  325 mg  BIDM


 PO  2/25/18 17:00


 3/27/18 17:59  2/28/18 08:09


325 MG


 


 Pantoprazole


 Sodium


  (Protonix Tab)  40 mg  QAM


 PO  2/26/18 08:00


 3/28/18 08:59  2/28/18 08:10


40 MG


 


 Sodium Chloride  1,000 ml @ 


 75 mls/hr  [id number]


 IV  2/25/18 16:00


 3/27/18 15:59  2/28/18 06:00


150 MLS/HR


 


 Oxycodone/


 Acetaminophen


  (Percocet


 5-325mg Tab)  1 tab  Q4H  PRN


 PO  2/25/18 15:45


 3/11/18 15:44  2/28/18 06:05


1 TAB


 


 Daptomycin 400 mg/


 Syringe  8 ml @ 4


 mls/min  Q48H


 IV  2/26/18 16:00


 3/12/18 15:59  2/26/18 17:30


4 MLS/MIN


 


 Oxybutynin


 Chloride


  (Ditropan Tab)  5 mg  TID


 PO  2/27/18 14:00


 3/29/18 13:59  2/28/18 08:09


5 MG


 


 Prednisone


  (PredniSONE TAB)  5 mg  DAILY


 PO  2/28/18 08:00


 3/28/18 08:59  2/28/18 08:10


5 MG











Objective


Vital Signs











  Date Time  Temp Pulse Resp B/P (MAP) Pulse Ox O2 Delivery O2 Flow Rate FiO2


 


2/28/18 09:50      Room Air  


 


2/28/18 07:25 36.7 78 20 155/70 (98) 97   


 


2/28/18 00:00      Room Air  


 


2/27/18 23:39 36.4 69 20 158/94 (115) 95 Room Air  


 


2/27/18 20:00      Room Air  


 


2/27/18 15:55 36.4 68 22 152/78 (102) 98 Room Air  











Physical Exam


General Appearance:  WD/WN, no apparent distress, + pertinent finding (alex 

catheter in place draining dark orange urine)


Respiratory/Chest:  lungs clear, normal breath sounds, no respiratory distress, 

no accessory muscle use


Cardiovascular:  regular rate, rhythm, no edema, no gallop, no murmur


Abdomen:  soft, + tenderness (mildly tender over suprapubic area)





Laboratory Results





Last 24 Hours








Test


  2/27/18


16:51 2/27/18


20:55 2/28/18


06:55 2/28/18


07:34


 


Bedside Glucose 156 mg/dl  149 mg/dl   138 mg/dl 


 


White Blood Count   8.40 K/uL  


 


Red Blood Count   2.70 M/uL  


 


Hemoglobin   7.2 g/dL  


 


Hematocrit   22.9 %  


 


Mean Corpuscular Volume   84.8 fL  


 


Mean Corpuscular Hemoglobin   26.7 pg  


 


Mean Corpuscular Hemoglobin


Concent 


  


  31.4 g/dl 


  


 


 


Platelet Count   97 K/uL  


 


Mean Platelet Volume   10.7 fL  


 


Neutrophils (%) (Auto)   89.8 %  


 


Lymphocytes (%) (Auto)   5.7 %  


 


Monocytes (%) (Auto)   3.7 %  


 


Eosinophils (%) (Auto)   0.7 %  


 


Basophils (%) (Auto)   0.0 %  


 


Neutrophils # (Auto)   7.54 K/uL  


 


Lymphocytes # (Auto)   0.48 K/uL  


 


Monocytes # (Auto)   0.31 K/uL  


 


Eosinophils # (Auto)   0.06 K/uL  


 


Basophils # (Auto)   0.00 K/uL  


 


RDW Standard Deviation   73.7 fL  


 


RDW Coefficient of Variation   24.8 %  


 


Immature Granulocyte % (Auto)   0.1 %  


 


Immature Granulocyte # (Auto)   0.01 K/uL  


 


Platelet Estimate   DECREASED  


 


Anisocytosis   PRESENT  


 


Echinocytes   1+  


 


Sodium Level   140 mmol/L  


 


Potassium Level   4.1 mmol/L  


 


Chloride Level   112 mmol/L  


 


Carbon Dioxide Level   21 mmol/L  


 


Anion Gap   7.0 mmol/L  


 


Blood Urea Nitrogen   42 mg/dl  


 


Creatinine   2.52 mg/dl  


 


Est Creatinine Clear Calc


Drug Dose 


  


  21.4 ml/min 


  


 


 


Estimated GFR (


American) 


  


  27.4 


  


 


 


Estimated GFR (Non-


American 


  


  23.6 


  


 


 


BUN/Creatinine Ratio   16.8  


 


Random Glucose   94 mg/dl  


 


Calcium Level   8.0 mg/dl  


 


Test


  2/28/18


11:43 


  


  


 


 


Bedside Glucose 99 mg/dl    











Assessment and Plan


Mr. Santos is a 77 year old male with a past medical history of rheumatoid 

arthritis on prednisone, BPH, Anemia of chronic disease, HTN, HLD, primary OA 

who was admitted for 12 hours of left-sided CVA pain and suprapubic pain on top 

of a 3 day hx of increasing sediment, pus, and orange color of urine in his 

indwelling catheter. Recent admission to [facility] from 2/2-14/18 for LINDA in setting 

of urinary retention secondary to worsening BPH.  During that admission he was 

found to have left sided hydroureteronephrosis. Was treated for complicated UTI 

with rocephin x 5 days. He had been seen by urology who had discussed 

possibility of inserting a stent but as he clinically improved, they decided to 

opt for outpatient follow up, which has not occurred yet. 





Pyelonephritis/Left VUJ obstruction/L Hydroureteronephrosis


- currently on daptomycin (thank you to ID for consult)


- blood cultures positive x2 for pan-sensitive staph aureus as well


- pt consented for PICC line 


   - as per ID, will d/c on 2 weeks of IV daptomycin 400mg q48h


   - will place PICC line on d/c in dominant arm to preserve non dominant arm 

in case fistula needs to be placed for dialysis


- CT showed moderate left hydroureteronephrosis with dilatation of the left 

ureter to the level the distal ureter. No ureteral calculi. Moderate left 

perinephric infiltration. 


- thank you to urology for consult


   - left ureteral stent placed 


   - keep alex in - f/u in outpatient setting for TURP once infection resolves


   - recommend 3 weeks of treatment with bactrim after IV abx for suspected 

prostatitis


- Percocet prn q4h for pain management


- decrease IVF to 75 mls/hr given adequate oral intake





Acute Kidney Injury


- creatinine improved slightly to 2.52 from 2.98. Pt states baseline recently 

has been around 2, but this is far worse than prior relatively recent baseline 

of 1 in 2017


- Continue to hold amlodipine (held prior to admission)


- thank you to nephrology for consult


   - monitor serial BMP


   - outpatient metabolic stone eval once stable


   - contrast and abdominal CT/MRI for left kidney complex cyst after 

resolution of LINDA





Anemia


- likely anemia of chronic disease related to RA, worsened by hematuria


- decreased from 8.6 to 7.2 today


- in setting of ongoing hematuria, transfuse with 1 unit and follow h&h


- fobt ordered as well to rule out GI source


- Continue iron supplementation - 325mg ferrous sulfate bid


- Had been given procrit at [facility] as well as 4 units of venofer





BPH, chronic x 15 years, worsening


- Continue flomax and finasteride


- Patient started on indwelling catheter at [facility]





RA


- Continue to hold leflunomide 2/2 recent weight loss


- continue 5mg of prednisone (baseline)


- continue folic acid





HTN


- continue Metoprolol 25 mg OD


- hold ASA 81 mg given low Hgb & bleeding





DVT Prophylaxis: SCDs


Code: Full


Dispo: remains on med/surg





Resident Physician Supervision Note:





I interviewed and examined the patient. Discussed with the resident physician 

and agree with findings and plan as documented in the note. Any exceptions or 

clarifications are listed here: 





1) Transfuse today and monitor HgB (> 8)


2) IVF and monitor BMP.


3) Will clarify outpatient recommendation for IV antibiotics - daptomycin or 

cefazolin. 


   PICC in dominant arm to preserve non-dominant arm if HD access needed. 











Documented By:  German Woody





Resident Tracking


Resident Involvement:  Resident Care Provided


Care Provided:  Adult Hospital Medicine

## 2018-03-01 VITALS
TEMPERATURE: 97.7 F | OXYGEN SATURATION: 98 % | DIASTOLIC BLOOD PRESSURE: 80 MMHG | HEART RATE: 60 BPM | SYSTOLIC BLOOD PRESSURE: 154 MMHG

## 2018-03-01 VITALS — SYSTOLIC BLOOD PRESSURE: 171 MMHG | DIASTOLIC BLOOD PRESSURE: 79 MMHG

## 2018-03-01 VITALS
DIASTOLIC BLOOD PRESSURE: 80 MMHG | HEART RATE: 78 BPM | OXYGEN SATURATION: 96 % | TEMPERATURE: 99.86 F | SYSTOLIC BLOOD PRESSURE: 183 MMHG

## 2018-03-01 VITALS — SYSTOLIC BLOOD PRESSURE: 169 MMHG | DIASTOLIC BLOOD PRESSURE: 78 MMHG

## 2018-03-01 LAB
BASOPHILS # BLD: 0.01 K/UL (ref 0–0.2)
BASOPHILS NFR BLD: 0.1 %
BUN SERPL-MCNC: 32 MG/DL (ref 7–18)
CALCIUM SERPL-MCNC: 8.3 MG/DL (ref 8.5–10.1)
CO2 SERPL-SCNC: 24 MMOL/L (ref 21–32)
CREAT SERPL-MCNC: 2.29 MG/DL (ref 0.6–1.4)
EOS ABS #: 0.14 K/UL (ref 0–0.5)
EOSINOPHIL NFR BLD AUTO: 125 K/UL (ref 130–400)
GLUCOSE SERPL-MCNC: 107 MG/DL (ref 70–99)
HCT VFR BLD CALC: 25.8 % (ref 42–52)
HGB BLD-MCNC: 8.1 G/DL (ref 14–18)
IG#: 0.02 K/UL (ref 0–0.02)
IMM GRANULOCYTES NFR BLD AUTO: 7.5 %
INFLUENZA B ANTIGEN: (no result)
LYMPHOCYTES # BLD: 0.6 K/UL (ref 1.2–3.4)
MCH RBC QN AUTO: 26.6 PG (ref 25–34)
MCHC RBC AUTO-ENTMCNC: 31.4 G/DL (ref 32–36)
MCV RBC AUTO: 84.6 FL (ref 80–100)
MONO ABS #: 0.44 K/UL (ref 0.11–0.59)
MONOCYTES NFR BLD: 5.5 %
NEUT ABS #: 6.81 K/UL (ref 1.4–6.5)
NEUTROPHILS # BLD AUTO: 1.7 %
NEUTROPHILS NFR BLD AUTO: 85 %
NRBC BLD AUTO-RTO: 0 %
NUCLEATED RED BLOOD CELL ABS: 0 K/UL (ref 0–0)
PMV BLD AUTO: 10.2 FL (ref 7.4–10.4)
POTASSIUM SERPL-SCNC: 4.2 MMOL/L (ref 3.5–5.1)
RED CELL DISTRIBUTION WIDTH CV: 24.3 % (ref 11.5–14.5)
RED CELL DISTRIBUTION WIDTH SD: 72.8 FL (ref 36.4–46.3)
SODIUM SERPL-SCNC: 142 MMOL/L (ref 136–145)
WBC # BLD AUTO: 7.96 K/UL (ref 4.8–10.8)

## 2018-03-01 RX ADMIN — PANTOPRAZOLE SCH MG: 40 TABLET, DELAYED RELEASE ORAL at 09:41

## 2018-03-01 RX ADMIN — METOPROLOL TARTRATE SCH MG: 25 TABLET, FILM COATED ORAL at 09:41

## 2018-03-01 RX ADMIN — FERROUS SULFATE TAB EC 325 MG (65 MG FE EQUIVALENT) SCH MG: 325 (65 FE) TABLET DELAYED RESPONSE at 17:37

## 2018-03-01 RX ADMIN — RANITIDINE SCH MG: 150 TABLET ORAL at 09:41

## 2018-03-01 RX ADMIN — FERROUS SULFATE TAB EC 325 MG (65 MG FE EQUIVALENT) SCH MG: 325 (65 FE) TABLET DELAYED RESPONSE at 09:41

## 2018-03-01 RX ADMIN — Medication SCH TAB: at 09:40

## 2018-03-01 RX ADMIN — OXYBUTYNIN CHLORIDE SCH MG: 5 TABLET ORAL at 14:14

## 2018-03-01 RX ADMIN — POTASSIUM CHLORIDE SCH MEQ: 1500 TABLET, EXTENDED RELEASE ORAL at 20:36

## 2018-03-01 RX ADMIN — TAMSULOSIN HYDROCHLORIDE SCH MG: 0.4 CAPSULE ORAL at 09:42

## 2018-03-01 RX ADMIN — SODIUM CHLORIDE SCH MLS/HR: 900 INJECTION, SOLUTION INTRAVENOUS at 08:11

## 2018-03-01 RX ADMIN — OXYBUTYNIN CHLORIDE SCH MG: 5 TABLET ORAL at 20:38

## 2018-03-01 RX ADMIN — OXYCODONE HYDROCHLORIDE AND ACETAMINOPHEN PRN TAB: 5; 325 TABLET ORAL at 19:10

## 2018-03-01 RX ADMIN — RANITIDINE SCH MG: 150 TABLET ORAL at 20:37

## 2018-03-01 RX ADMIN — METOPROLOL TARTRATE SCH MG: 25 TABLET, FILM COATED ORAL at 20:35

## 2018-03-01 RX ADMIN — OXYCODONE HYDROCHLORIDE AND ACETAMINOPHEN PRN TAB: 5; 325 TABLET ORAL at 08:24

## 2018-03-01 RX ADMIN — OXYBUTYNIN CHLORIDE SCH MG: 5 TABLET ORAL at 09:41

## 2018-03-01 RX ADMIN — POTASSIUM CHLORIDE SCH MEQ: 1500 TABLET, EXTENDED RELEASE ORAL at 09:42

## 2018-03-01 RX ADMIN — FINASTERIDE SCH MG: 5 TABLET, FILM COATED ORAL at 20:37

## 2018-03-01 NOTE — FAMILY MEDICINE PROGRESS NOTE
Progress Note


Date of Service


Mar 1, 2018.





Subjective


Pt evaluation today including:  conversation w/ patient, physical exam, chart 

review, lab review, review of inpatient medication list


Pain:  Suprapubic pain reported


PO Intake:  Tolerating PO intake


Voiding:  alex catheter in place


Mr. Santos reports he feels well today. He denies fever, chills, n/v, dizziness 

whilst ambulating. He notes his suprapubic and flank pain remain, but that they 

are not constant, but occur every so often. He states the pain is occurring 

less frequently day by day, but that the intensity is about the same





   Constitutional:  No fever, No chills


   Respiratory:  No cough, No sputum, No shortness of breath


   Cardiovascular:  No chest pain, No edema


   Abdomen:  No nausea, No vomiting


   All Other Systems:  Reviewed and Negative





Medications





Current Inpatient Medications








 Medications


  (Trade)  Dose


 Ordered  Sig/Juaquin


 Route  Start Time


 Stop Time Status Last Admin


Dose Admin


 


 Acetaminophen


  (Tylenol Tab)  650 mg  Q4H  PRN


 PO  2/25/18 15:45


 3/27/18 15:44   


 


 


 Al Hydrox/Mg


 Hydrox/Simethicone


  (Maalox Max Susp)  15 ml  Q4H  PRN


 PO  2/25/18 15:45


 3/27/18 15:44   


 


 


 Magnesium


 Hydroxide


  (Milk Of


 Magnesia Susp)  30 ml  Q6H  PRN


 PO  2/25/18 15:45


 3/27/18 15:44   


 


 


 Polyethylene


  (Miralax Powder


 Packet)  17 gm  DAILY  PRN


 PO  2/25/18 15:45


 3/27/18 15:44   


 


 


 Ondansetron HCl


  (Zofran Inj)  4 mg  Q6H  PRN


 IV  2/25/18 15:45


 3/27/18 15:44   


 


 


 Aspirin


  (Ecotrin Tab)  81 mg  BID


 PO  2/25/18 20:00


 3/27/18 20:59 Future Hold 2/28/18 08:09


81 MG


 


 Finasteride


  (Proscar Tab)  5 mg  HS


 PO  2/25/18 21:00


 3/27/18 20:59  2/28/18 20:55


5 MG


 


 Folic Acid


  (Folvite Tab)  1 mg  QAM


 PO  2/26/18 08:00


 3/28/18 08:59  3/1/18 09:42


1 MG


 


 Multivitamins


  (Multivitamin


 Tab)  1 tab  QAM


 PO  2/26/18 08:00


 3/28/18 08:59  3/1/18 09:40


1 TAB


 


 Potassium Chloride


  (Klor-Con Tab)  20 meq  BID


 PO  2/25/18 20:00


 3/27/18 20:59  3/1/18 09:42


20 MEQ


 


 Ranitidine HCl


  (zANTac TAB)  150 mg  BID


 PO  2/25/18 20:00


 3/27/18 20:59  3/1/18 09:41


150 MG


 


 Tamsulosin HCl


  (Flomax Cap)  0.4 mg  DAILY


 PO  2/26/18 08:00


 3/28/18 08:59  3/1/18 09:42


0.4 MG


 


 Ferrous Sulfate


  (Feosol Tab)  325 mg  BIDM


 PO  2/25/18 17:00


 3/27/18 17:59  3/1/18 09:41


325 MG


 


 Pantoprazole


 Sodium


  (Protonix Tab)  40 mg  QAM


 PO  2/26/18 08:00


 3/28/18 08:59  3/1/18 09:41


40 MG


 


 Oxycodone/


 Acetaminophen


  (Percocet


 5-325mg Tab)  1 tab  Q4H  PRN


 PO  2/25/18 15:45


 3/11/18 15:44  3/1/18 08:24


1 TAB


 


 Oxybutynin


 Chloride


  (Ditropan Tab)  5 mg  TID


 PO  2/27/18 14:00


 3/29/18 13:59  3/1/18 14:14


5 MG


 


 Prednisone


  (PredniSONE TAB)  5 mg  DAILY


 PO  2/28/18 08:00


 3/28/18 08:59  3/1/18 09:40


5 MG


 


 Metoprolol


 Tartrate


  (Lopressor Tab)  25 mg  BID


 PO  3/1/18 20:00


 3/28/18 08:59   


 


 


 Daptomycin 400 mg/


 Syringe  8 ml @ 4


 mls/min  Q48H


 IV  3/2/18 09:00


 3/16/18 08:59   


 











Objective


Vital Signs











  Date Time  Temp Pulse Resp B/P (MAP) Pulse Ox O2 Delivery O2 Flow Rate FiO2


 


3/1/18 15:11 36.5 60 16 154/80 (104) 98 Room Air  


 


3/1/18 12:32    171/79 (109)    


 


3/1/18 11:21      Room Air  


 


3/1/18 10:53      Room Air  


 


3/1/18 08:09 37.7 78 16 183/80 (114) 96 Room Air  


 


3/1/18 02:29    169/78 (108)    


 


3/1/18 00:00      Room Air  


 


2/28/18 23:35 36.7 74 18 188/83 (118) 96 Room Air  











Physical Exam


General Appearance:  WD/WN, no apparent distress


Respiratory/Chest:  lungs clear, normal breath sounds, no respiratory distress, 

no accessory muscle use


Cardiovascular:  regular rate, rhythm, no edema


Abdomen:  soft, + pertinent finding (mildly tender over one small spot in left 

suprapubic/groin region)


Extremities:  no pedal edema, no calf tenderness





Laboratory Results





Last 24 Hours








Test


  2/28/18


16:50 2/28/18


20:51 3/1/18


05:40 3/1/18


07:27


 


Bedside Glucose 126 mg/dl  103 mg/dl   113 mg/dl 


 


White Blood Count   7.96 K/uL  


 


Red Blood Count   3.05 M/uL  


 


Hemoglobin   8.1 g/dL  


 


Hematocrit   25.8 %  


 


Mean Corpuscular Volume   84.6 fL  


 


Mean Corpuscular Hemoglobin   26.6 pg  


 


Mean Corpuscular Hemoglobin


Concent 


  


  31.4 g/dl 


  


 


 


Platelet Count   125 K/uL  


 


Mean Platelet Volume   10.2 fL  


 


Neutrophils (%) (Auto)   85.0 %  


 


Lymphocytes (%) (Auto)   7.5 %  


 


Monocytes (%) (Auto)   5.5 %  


 


Eosinophils (%) (Auto)   1.7 %  


 


Basophils (%) (Auto)   0.1 %  


 


Neutrophils # (Auto)   6.81 K/uL  


 


Lymphocytes # (Auto)   0.60 K/uL  


 


Monocytes # (Auto)   0.44 K/uL  


 


Eosinophils # (Auto)   0.14 K/uL  


 


Basophils # (Auto)   0.01 K/uL  


 


RDW Standard Deviation   72.8 fL  


 


RDW Coefficient of Variation   24.3 %  


 


Immature Granulocyte % (Auto)   0.2 %  


 


Immature Granulocyte # (Auto)   0.02 K/uL  


 


Nucleated RBC Absolute Count


(auto) 


  


  0.00 K/uL 


  


 


 


Nucleated Red Blood Cells %   0.0 %  


 


Hypochromasia   PRESENT  


 


Anisocytosis   PRESENT  


 


Sodium Level   142 mmol/L  


 


Potassium Level   4.2 mmol/L  


 


Chloride Level   113 mmol/L  


 


Carbon Dioxide Level   24 mmol/L  


 


Anion Gap   5.0 mmol/L  


 


Blood Urea Nitrogen   32 mg/dl  


 


Creatinine   2.29 mg/dl  


 


Est Creatinine Clear Calc


Drug Dose 


  


  23.5 ml/min 


  


 


 


Estimated GFR (


American) 


  


  30.8 


  


 


 


Estimated GFR (Non-


American 


  


  26.5 


  


 


 


BUN/Creatinine Ratio   13.8  


 


Random Glucose   107 mg/dl  


 


Calcium Level   8.3 mg/dl  


 


Test


  3/1/18


09:50 3/1/18


11:42 


  


 


 


Influenza Type A Antigen


  Neg for Influ


A 


  


  


 


 


Influenza Type B Antigen


  Neg for Influ


B 


  


  


 


 


Bedside Glucose  108 mg/dl   











Assessment and Plan


Mr. Santos is a 77 year old male with a past medical history of rheumatoid 

arthritis on prednisone, BPH, Anemia of chronic disease, HTN, HLD, primary OA 

who was admitted for 12 hours of left-sided CVA pain and suprapubic pain on top 

of a 3 day hx of increasing sediment, pus, and orange color of urine in his 

indwelling catheter. Recent admission to [facility] from 2/2-14/18 for LINDA in setting 

of urinary retention secondary to worsening BPH.  During that admission he was 

found to have left sided hydroureteronephrosis. Was treated for complicated UTI 

with rocephin x 5 days. He had been seen by urology who had discussed 

possibility of inserting a stent but as he clinically improved, they decided to 

opt for outpatient follow up, which has not occurred yet. 





Bacteremia secondary to Pyelonephritis/Left VUJ obstruction/L 

Hydroureteronephrosis


- urine cultures and blood cultures positive x2 for pan-sensitive staph aureus


- thank you to ID for consult


   - currently on daptomycin day 4


   - ECHO tomorrow to rule out endocarditis 


- pt consented for PICC line 


   - as per ID, will d/c on 2 weeks of IV daptomycin 400mg q48h


   - will place PICC line on d/c in dominant arm to preserve non dominant arm 

in case fistula needs to be placed for dialysis


   - awaiting repeat negative blood cultures to place PICC line


- CT showed moderate left hydroureteronephrosis with dilatation of the left 

ureter to the level the distal ureter. No ureteral calculi. Moderate left 

perinephric infiltration. 


- thank you to urology for consult


   - left ureteral stent placed 


   - keep alex in - f/u in outpatient setting for TURP once infection resolves


   - recommend 3 weeks of treatment with bactrim after IV abx for suspected 

prostatitis


- Percocet prn q4h for pain management


- d/c IVF given adequate oral intake





Acute Kidney Injury


- creatinine continuing to improve - now 2.29. states baseline recently has 

been around 2, but this is far worse than prior relatively recent baseline of 1 

in 2017


- Continue to hold amlodipine (held prior to admission due to concerns it may 

have caused AIN)


- thank you to nephrology for consult


   - ok to d/c from nephro point of view


   - outpatient metabolic stone eval once stable


   - contrast and abdominal CT/MRI for left kidney complex cyst in outpatient 

setting





Anemia


- likely anemia of chronic disease related to RA, worsened by hematuria


- transfused with 1 unit yesterday


- improved from 7.2 to 8.1


- will continue to monitor


- FOBT negative


- Continue iron supplementation - 325mg ferrous sulfate bid


- Had been given procrit at [facility] as well as 4 units of venofer





BPH, chronic x 15 years, worsening


- Continue flomax and finasteride


- Patient started on indwelling catheter at [facility]





RA


- Continue to hold leflunomide 2/2 recent weight loss


- continue 5mg of prednisone (baseline)


- continue folic acid





HTN


- pt's blood pressure has been on the higher side - as high as 180 systolic


- increase Metoprolol 25 mg from daily to BID with hold parameters


- hold ASA 81 mg given low Hgb & bleeding





DVT Prophylaxis: SCDs


Code: Full


Dispo: remains on med/surg. Eventual d/c with homehealth. 





Resident Physician Supervision Note:





I was present with resident during the history and exam. I discussed the case 

with the resident and agree with the findings and plan as documented in the 

note.  Patient was out of bed and in general reported feeling better.  Repeat 

blood cultures drawn today.  Infectious disease and nephrology consultations 

reviewed and appreciated.





PLAN


1) PIC line in dominant arm once repeat blood cultures are negative.


2) IV antibiotic regimens per infectious disease recommendation.


3) monitor hemoglobin; transfuse to keep hemoglobin greater than 8.


4) transthoracic echocardiogram; suspicion for endocarditis is low.


5) continue to monitor renal function; we will need outpatient nephrology follow

-up upon discharge.


6) outpatient urology appointment upon discharge for more definitive treatment 

of his obstruction.














Documented By:  German Woody





Resident Tracking


Resident Involvement:  Resident Care Provided


Care Provided:  Adult Hospital Medicine

## 2018-03-01 NOTE — NEPHROLOGY PROGRESS NOTE
Nephrology Progress Note


Date of Service


Mar 1, 2018.





Chief Complaint


LINDA





Subjective


Mr. Santos was seen & examined in his hospital room this morning.  He complains 

of suprapubic discomfort associated with his ureteral stent.  He denies fever 

or flank discomfort.  Mr. Santos reports that he is tolerating his diet and has 

remained ambulatory.  He hopes to return home soon.





Review of Systems


Constitutional:  No fever


Cardiovascular:  No chest pain


Respiratory:  No dyspnea at rest


Abdomen:  + problem reported (suprapubic discomfort)


Genitourinary - Male:  + gross hematuria


Extremities:  No leg edema


A complete review of systems was performed.  Pertinent positives are noted 

above. All other systems are negative.





Vital Signs





Last 8 Hrs








  Date Time  Temp Pulse Resp B/P (MAP) Pulse Ox O2 Delivery O2 Flow Rate FiO2


 


3/1/18 10:53      Room Air  


 


3/1/18 08:09 37.7 78 16 183/80 (114) 96 Room Air  











Last Recorded Weight


Weight (Kilograms):  65.700





Physical Exam


General Appearance:  no apparent distress


Head:  normocephalic, atraumatic


Eyes:  PERRL, EOMI


Neck:  no adenopathy


Respiratory/Chest:  lungs clear, no respiratory distress


Cardiovascular:  regular rate, rhythm


Abdomen/GI:  normal bowel sounds, non tender, soft


Genitourinary - Male:  + pertinent finding (alex catheter draining bloody urine

)


Extremities/Musculoskelatal:  no calf tenderness, no pedal edema


Neurologic/Psych:  alert, oriented x 3





Family History





FH: CAD (coronary artery disease)


  BROTHER





Negative for CKD / ESRD





Social History


Smoking Status:  Former smoker


Marital Status:  


Housing Status:  lives with family


Occupation:  retired





.  Retired.  Formerly worked at [employer], later owned a 

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





Laboratory Results


Past 24 Hours


3/1/18 05:40








Red Blood Count 3.05, Mean Corpuscular Volume 84.6, Mean Corpuscular Hemoglobin 

26.6, Mean Corpuscular Hemoglobin Concent 31.4, Mean Platelet Volume 10.2, 

Neutrophils (%) (Auto) 85.0, Lymphocytes (%) (Auto) 7.5, Monocytes (%) (Auto) 

5.5, Eosinophils (%) (Auto) 1.7, Basophils (%) (Auto) 0.1, Neutrophils # (Auto) 

6.81, Lymphocytes # (Auto) 0.60, Monocytes # (Auto) 0.44, Eosinophils # (Auto) 

0.14, Basophils # (Auto) 0.01





3/1/18 05:40

















Test


  2/28/18


11:43 2/28/18


16:50 2/28/18


20:51 3/1/18


05:40


 


Bedside Glucose


  99 mg/dl


(70-99) 126 mg/dl


(70-99) 103 mg/dl


(70-99) 


 


 


White Blood Count


  


  


  


  7.96 K/uL


(4.8-10.8)


 


Red Blood Count


  


  


  


  3.05 M/uL


(4.7-6.1)


 


Hemoglobin


  


  


  


  8.1 g/dL


(14.0-18.0)


 


Hematocrit    25.8 % (42-52) 


 


Mean Corpuscular Volume


  


  


  


  84.6 fL


()


 


Mean Corpuscular Hemoglobin


  


  


  


  26.6 pg


(25-34)


 


Mean Corpuscular Hemoglobin


Concent 


  


  


  31.4 g/dl


(32-36)


 


Platelet Count


  


  


  


  125 K/uL


(130-400)


 


Mean Platelet Volume


  


  


  


  10.2 fL


(7.4-10.4)


 


Neutrophils (%) (Auto)    85.0 % 


 


Lymphocytes (%) (Auto)    7.5 % 


 


Monocytes (%) (Auto)    5.5 % 


 


Eosinophils (%) (Auto)    1.7 % 


 


Basophils (%) (Auto)    0.1 % 


 


Neutrophils # (Auto)


  


  


  


  6.81 K/uL


(1.4-6.5)


 


Lymphocytes # (Auto)


  


  


  


  0.60 K/uL


(1.2-3.4)


 


Monocytes # (Auto)


  


  


  


  0.44 K/uL


(0.11-0.59)


 


Eosinophils # (Auto)


  


  


  


  0.14 K/uL


(0-0.5)


 


Basophils # (Auto)


  


  


  


  0.01 K/uL


(0-0.2)


 


RDW Standard Deviation


  


  


  


  72.8 fL


(36.4-46.3)


 


RDW Coefficient of Variation


  


  


  


  24.3 %


(11.5-14.5)


 


Immature Granulocyte % (Auto)    0.2 % 


 


Immature Granulocyte # (Auto)


  


  


  


  0.02 K/uL


(0.00-0.02)


 


Nucleated RBC Absolute Count


(auto) 


  


  


  0.00 K/uL


(0-0)


 


Nucleated Red Blood Cells %    0.0 % 


 


Hypochromasia    PRESENT 


 


Anisocytosis    PRESENT 


 


Anion Gap


  


  


  


  5.0 mmol/L


(3-11)


 


Est Creatinine Clear Calc


Drug Dose 


  


  


  23.5 ml/min 


 


 


Estimated GFR (


American) 


  


  


  30.8 


 


 


Estimated GFR (Non-


American 


  


  


  26.5 


 


 


BUN/Creatinine Ratio    13.8 (10-20) 


 


Calcium Level


  


  


  


  8.3 mg/dl


(8.5-10.1)


 


Test


  3/1/18


07:27 3/1/18


09:50 


  


 


 


Bedside Glucose


  113 mg/dl


(70-99) 


  


  


 


 


Influenza Type A Antigen


  


  Neg for Influ


A (NEG) 


  


 


 


Influenza Type B Antigen


  


  Neg for Influ


B (NEG) 


  


 











Allergies


Coded Allergies:  


     Lisinopril (Verified  Adverse Reaction, Mild, COUGH, 12/13/17)





Medications





Current Inpatient Medications








 Medications


  (Trade)  Dose


 Ordered  Sig/Juaquin


 Route  Start Time


 Stop Time Status Last Admin


Dose Admin


 


 Acetaminophen


  (Tylenol Tab)  650 mg  Q4H  PRN


 PO  2/25/18 15:45


 3/27/18 15:44   


 


 


 Al Hydrox/Mg


 Hydrox/Simethicone


  (Maalox Max Susp)  15 ml  Q4H  PRN


 PO  2/25/18 15:45


 3/27/18 15:44   


 


 


 Magnesium


 Hydroxide


  (Milk Of


 Magnesia Susp)  30 ml  Q6H  PRN


 PO  2/25/18 15:45


 3/27/18 15:44   


 


 


 Polyethylene


  (Miralax Powder


 Packet)  17 gm  DAILY  PRN


 PO  2/25/18 15:45


 3/27/18 15:44   


 


 


 Ondansetron HCl


  (Zofran Inj)  4 mg  Q6H  PRN


 IV  2/25/18 15:45


 3/27/18 15:44   


 


 


 Aspirin


  (Ecotrin Tab)  81 mg  BID


 PO  2/25/18 20:00


 3/27/18 20:59 Future Hold 2/28/18 08:09


81 MG


 


 Finasteride


  (Proscar Tab)  5 mg  HS


 PO  2/25/18 21:00


 3/27/18 20:59  2/28/18 20:55


5 MG


 


 Folic Acid


  (Folvite Tab)  1 mg  QAM


 PO  2/26/18 08:00


 3/28/18 08:59  3/1/18 09:42


1 MG


 


 Metoprolol


 Tartrate


  (Lopressor Tab)  25 mg  DAILY


 PO  2/26/18 08:00


 3/28/18 08:59  3/1/18 09:41


25 MG


 


 Multivitamins


  (Multivitamin


 Tab)  1 tab  QAM


 PO  2/26/18 08:00


 3/28/18 08:59  3/1/18 09:40


1 TAB


 


 Potassium Chloride


  (Klor-Con Tab)  20 meq  BID


 PO  2/25/18 20:00


 3/27/18 20:59  3/1/18 09:42


20 MEQ


 


 Ranitidine HCl


  (zANTac TAB)  150 mg  BID


 PO  2/25/18 20:00


 3/27/18 20:59  3/1/18 09:41


150 MG


 


 Tamsulosin HCl


  (Flomax Cap)  0.4 mg  DAILY


 PO  2/26/18 08:00


 3/28/18 08:59  3/1/18 09:42


0.4 MG


 


 Ferrous Sulfate


  (Feosol Tab)  325 mg  BIDM


 PO  2/25/18 17:00


 3/27/18 17:59  3/1/18 09:41


325 MG


 


 Pantoprazole


 Sodium


  (Protonix Tab)  40 mg  QAM


 PO  2/26/18 08:00


 3/28/18 08:59  3/1/18 09:41


40 MG


 


 Sodium Chloride  1,000 ml @ 


 75 mls/hr  [id number]


 IV  2/25/18 16:00


 3/27/18 15:59  3/1/18 08:11


75 MLS/HR


 


 Oxycodone/


 Acetaminophen


  (Percocet


 5-325mg Tab)  1 tab  Q4H  PRN


 PO  2/25/18 15:45


 3/11/18 15:44  3/1/18 08:24


1 TAB


 


 Oxybutynin


 Chloride


  (Ditropan Tab)  5 mg  TID


 PO  2/27/18 14:00


 3/29/18 13:59  3/1/18 09:41


5 MG


 


 Prednisone


  (PredniSONE TAB)  5 mg  DAILY


 PO  2/28/18 08:00


 3/28/18 08:59  3/1/18 09:40


5 MG


 


 Cefazolin Sodium


 1000 mg/Syringe  7.5 ml @ 


 2.5 mls/min  Q12H


 IV  3/1/18 10:45


 3/15/18 10:44   


 











Impression





(1) Acute renal failure


(2) Hydronephrosis


(3) Kidney stones


(4) Pyelonephritis


(5) BPH (benign prostatic hyperplasia)


(6) Renal cyst


(7) Chronic anemia





Recommendations


ACUTE KIDNEY INJURY:


-- BPH resulting in L ureteral obstruction s/p cystoscopy w/ stenting 02/27


-- Kidney function is mildly improved (creatinine 2.2 this am),  electrolyte 

balance is acceptable.  OK to discharge from Nephrology perspective.  Will ask 

my office staff to schedule outpatient labs and follow up visit within 7 - 14 

days


-- Will need outpatient Urology appointment





ANEMIA:


-- INR is 1.2.  Hgb improved to 8.1 this am





ID:


-- Blood & urine cultures are + for Staph Aureus sensitive to Daptomycin 


-- ID recommends a 4 week course of IV Ancef





RHEUM:


-- Patient has a h/o RA treated w/ Leflunomide and Prednisone therapy


-- Prednisone has been reduced to 5 mg daily





KIDNEY STONES:


-- Will benefit from outpatient metabolic stone evaluation once medically stable





KIDNEY CYSTS:


-- L kidney has complex cyst in the lower pole.  Patient will likely require 

contrast + abdominal CT or MRI once kidney function recovers

## 2018-03-02 VITALS
HEART RATE: 59 BPM | DIASTOLIC BLOOD PRESSURE: 77 MMHG | SYSTOLIC BLOOD PRESSURE: 161 MMHG | TEMPERATURE: 97.88 F | OXYGEN SATURATION: 95 %

## 2018-03-02 VITALS
OXYGEN SATURATION: 96 % | DIASTOLIC BLOOD PRESSURE: 81 MMHG | HEART RATE: 59 BPM | SYSTOLIC BLOOD PRESSURE: 176 MMHG | TEMPERATURE: 97.88 F

## 2018-03-02 VITALS — OXYGEN SATURATION: 97 %

## 2018-03-02 VITALS
TEMPERATURE: 97.88 F | DIASTOLIC BLOOD PRESSURE: 95 MMHG | OXYGEN SATURATION: 96 % | HEART RATE: 61 BPM | SYSTOLIC BLOOD PRESSURE: 188 MMHG

## 2018-03-02 VITALS
SYSTOLIC BLOOD PRESSURE: 172 MMHG | HEART RATE: 65 BPM | DIASTOLIC BLOOD PRESSURE: 90 MMHG | OXYGEN SATURATION: 97 % | TEMPERATURE: 97.88 F

## 2018-03-02 LAB
BUN SERPL-MCNC: 27 MG/DL (ref 7–18)
CALCIUM SERPL-MCNC: 8.5 MG/DL (ref 8.5–10.1)
CO2 SERPL-SCNC: 23 MMOL/L (ref 21–32)
CREAT SERPL-MCNC: 2.26 MG/DL (ref 0.6–1.4)
EOSINOPHIL NFR BLD AUTO: 121 K/UL (ref 130–400)
GLUCOSE SERPL-MCNC: 87 MG/DL (ref 70–99)
HCT VFR BLD CALC: 25.7 % (ref 42–52)
HGB BLD-MCNC: 8.1 G/DL (ref 14–18)
MCH RBC QN AUTO: 26.9 PG (ref 25–34)
MCHC RBC AUTO-ENTMCNC: 31.5 G/DL (ref 32–36)
MCV RBC AUTO: 85.4 FL (ref 80–100)
PMV BLD AUTO: 10.6 FL (ref 7.4–10.4)
POTASSIUM SERPL-SCNC: 3.8 MMOL/L (ref 3.5–5.1)
RED CELL DISTRIBUTION WIDTH CV: 23.9 % (ref 11.5–14.5)
RED CELL DISTRIBUTION WIDTH SD: 72.8 FL (ref 36.4–46.3)
SODIUM SERPL-SCNC: 138 MMOL/L (ref 136–145)
WBC # BLD AUTO: 6.62 K/UL (ref 4.8–10.8)

## 2018-03-02 RX ADMIN — Medication SCH TAB: at 08:06

## 2018-03-02 RX ADMIN — OXYCODONE HYDROCHLORIDE AND ACETAMINOPHEN PRN TAB: 5; 325 TABLET ORAL at 23:38

## 2018-03-02 RX ADMIN — DAPTOMYCIN SCH MLS/MIN: 50 INJECTION, POWDER, LYOPHILIZED, FOR SOLUTION INTRAVENOUS at 09:25

## 2018-03-02 RX ADMIN — PANTOPRAZOLE SCH MG: 40 TABLET, DELAYED RELEASE ORAL at 08:06

## 2018-03-02 RX ADMIN — FERROUS SULFATE TAB EC 325 MG (65 MG FE EQUIVALENT) SCH MG: 325 (65 FE) TABLET DELAYED RESPONSE at 17:51

## 2018-03-02 RX ADMIN — FINASTERIDE SCH MG: 5 TABLET, FILM COATED ORAL at 20:25

## 2018-03-02 RX ADMIN — METOPROLOL TARTRATE SCH MG: 25 TABLET, FILM COATED ORAL at 08:06

## 2018-03-02 RX ADMIN — OXYBUTYNIN CHLORIDE SCH MG: 5 TABLET ORAL at 13:52

## 2018-03-02 RX ADMIN — RANITIDINE SCH MG: 150 TABLET ORAL at 20:27

## 2018-03-02 RX ADMIN — RANITIDINE SCH MG: 150 TABLET ORAL at 08:06

## 2018-03-02 RX ADMIN — OXYCODONE HYDROCHLORIDE AND ACETAMINOPHEN PRN TAB: 5; 325 TABLET ORAL at 08:06

## 2018-03-02 RX ADMIN — FERROUS SULFATE TAB EC 325 MG (65 MG FE EQUIVALENT) SCH MG: 325 (65 FE) TABLET DELAYED RESPONSE at 08:04

## 2018-03-02 RX ADMIN — POTASSIUM CHLORIDE SCH MEQ: 1500 TABLET, EXTENDED RELEASE ORAL at 20:26

## 2018-03-02 RX ADMIN — POTASSIUM CHLORIDE SCH MEQ: 1500 TABLET, EXTENDED RELEASE ORAL at 08:05

## 2018-03-02 RX ADMIN — TAMSULOSIN HYDROCHLORIDE SCH MG: 0.4 CAPSULE ORAL at 08:05

## 2018-03-02 RX ADMIN — OXYBUTYNIN CHLORIDE SCH MG: 5 TABLET ORAL at 20:26

## 2018-03-02 RX ADMIN — OXYCODONE HYDROCHLORIDE AND ACETAMINOPHEN PRN TAB: 5; 325 TABLET ORAL at 18:50

## 2018-03-02 RX ADMIN — METOPROLOL TARTRATE SCH MG: 25 TABLET, FILM COATED ORAL at 20:25

## 2018-03-02 RX ADMIN — OXYCODONE HYDROCHLORIDE AND ACETAMINOPHEN PRN TAB: 5; 325 TABLET ORAL at 13:52

## 2018-03-02 RX ADMIN — OXYBUTYNIN CHLORIDE SCH MG: 5 TABLET ORAL at 08:04

## 2018-03-02 NOTE — NEPHROLOGY PROGRESS NOTE
Nephrology Progress Note


Date of Service


Mar 2, 2018.





Chief Complaint


LINDA





Subjective


Mr. Santos was seen & examined in his hospital room this morning.  He complains 

of suprapubic discomfort related to his ureteral stent.  Mr. Santos reports that 

he is awaiting an echocardiogram study and picc line insertion and then hopes 

to return home.





Review of Systems


Constitutional:  No fever


Cardiovascular:  No chest pain


Respiratory:  No dyspnea at rest


Abdomen:  No pain, No vomiting


Genitourinary - Male:  + problem reported (suprapubic discomfort)


Extremities:  No leg edema


A complete review of systems was performed.  Pertinent positives are noted 

above. All other systems are negative.





Vital Signs





Last 8 Hrs








  Date Time  Temp Pulse Resp B/P (MAP) Pulse Ox O2 Delivery O2 Flow Rate FiO2


 


3/2/18 10:21     97 Room Air  


 


3/2/18 08:10 36.6 65 20 172/90 (117) 97 Room Air  











Last Recorded Weight


Weight (Kilograms):  65.700





Physical Exam


General Appearance:  no apparent distress


Head:  normocephalic, atraumatic


Eyes:  PERRL, EOMI


Neck:  no adenopathy


Respiratory/Chest:  lungs clear, no respiratory distress


Cardiovascular:  regular rate, rhythm


Abdomen/GI:  normal bowel sounds, non tender, soft


Genitourinary - Male:  + pertinent finding (alex catheter in place draining 

clear yellow urine)


Extremities/Musculoskelatal:  no calf tenderness, no pedal edema


Neurologic/Psych:  alert, oriented x 3





Family History





FH: CAD (coronary artery disease)


  BROTHER





Negative for CKD / ESRD





Social History


Smoking Status:  Former smoker


Marital Status:  


Housing Status:  lives with family


Occupation:  retired





.  Retired.  Formerly worked at [employer], later owned a 

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





Laboratory Results


Past 24 Hours


3/2/18 06:46








3/2/18 06:46

















Test


  3/1/18


11:42 3/2/18


06:46


 


Bedside Glucose


  108 mg/dl


(70-99) 


 


 


Red Blood Count


  


  3.01 M/uL


(4.7-6.1)


 


Mean Corpuscular Volume


  


  85.4 fL


()


 


Mean Corpuscular Hemoglobin


  


  26.9 pg


(25-34)


 


Mean Corpuscular Hemoglobin


Concent 


  31.5 g/dl


(32-36)


 


RDW Standard Deviation


  


  72.8 fL


(36.4-46.3)


 


RDW Coefficient of Variation


  


  23.9 %


(11.5-14.5)


 


Mean Platelet Volume


  


  10.6 fL


(7.4-10.4)


 


Anion Gap


  


  7.0 mmol/L


(3-11)


 


Est Creatinine Clear Calc


Drug Dose 


  23.8 ml/min 


 


 


Estimated GFR (


American) 


  31.3 


 


 


Estimated GFR (Non-


American 


  27.0 


 


 


BUN/Creatinine Ratio  12.1 (10-20) 


 


Calcium Level


  


  8.5 mg/dl


(8.5-10.1)











Allergies


Coded Allergies:  


     Lisinopril (Verified  Adverse Reaction, Mild, COUGH, 12/13/17)





Medications





Current Inpatient Medications








 Medications


  (Trade)  Dose


 Ordered  Sig/Juaquin


 Route  Start Time


 Stop Time Status Last Admin


Dose Admin


 


 Acetaminophen


  (Tylenol Tab)  650 mg  Q4H  PRN


 PO  2/25/18 15:45


 3/27/18 15:44   


 


 


 Al Hydrox/Mg


 Hydrox/Simethicone


  (Maalox Max Susp)  15 ml  Q4H  PRN


 PO  2/25/18 15:45


 3/27/18 15:44   


 


 


 Magnesium


 Hydroxide


  (Milk Of


 Magnesia Susp)  30 ml  Q6H  PRN


 PO  2/25/18 15:45


 3/27/18 15:44   


 


 


 Polyethylene


  (Miralax Powder


 Packet)  17 gm  DAILY  PRN


 PO  2/25/18 15:45


 3/27/18 15:44   


 


 


 Ondansetron HCl


  (Zofran Inj)  4 mg  Q6H  PRN


 IV  2/25/18 15:45


 3/27/18 15:44   


 


 


 Aspirin


  (Ecotrin Tab)  81 mg  BID


 PO  2/25/18 20:00


 3/27/18 20:59 Future Hold 2/28/18 08:09


81 MG


 


 Finasteride


  (Proscar Tab)  5 mg  HS


 PO  2/25/18 21:00


 3/27/18 20:59  3/1/18 20:37


5 MG


 


 Folic Acid


  (Folvite Tab)  1 mg  QAM


 PO  2/26/18 08:00


 3/28/18 08:59  3/2/18 08:05


1 MG


 


 Multivitamins


  (Multivitamin


 Tab)  1 tab  QAM


 PO  2/26/18 08:00


 3/28/18 08:59  3/2/18 08:06


1 TAB


 


 Potassium Chloride


  (Klor-Con Tab)  20 meq  BID


 PO  2/25/18 20:00


 3/27/18 20:59  3/2/18 08:05


20 MEQ


 


 Ranitidine HCl


  (zANTac TAB)  150 mg  BID


 PO  2/25/18 20:00


 3/27/18 20:59  3/2/18 08:06


150 MG


 


 Tamsulosin HCl


  (Flomax Cap)  0.4 mg  DAILY


 PO  2/26/18 08:00


 3/28/18 08:59  3/2/18 08:05


0.4 MG


 


 Ferrous Sulfate


  (Feosol Tab)  325 mg  BIDM


 PO  2/25/18 17:00


 3/27/18 17:59  3/2/18 08:04


325 MG


 


 Pantoprazole


 Sodium


  (Protonix Tab)  40 mg  QAM


 PO  2/26/18 08:00


 3/28/18 08:59  3/2/18 08:06


40 MG


 


 Oxycodone/


 Acetaminophen


  (Percocet


 5-325mg Tab)  1 tab  Q4H  PRN


 PO  2/25/18 15:45


 3/11/18 15:44  3/2/18 08:06


1 TAB


 


 Oxybutynin


 Chloride


  (Ditropan Tab)  5 mg  TID


 PO  2/27/18 14:00


 3/29/18 13:59  3/2/18 08:04


5 MG


 


 Prednisone


  (PredniSONE TAB)  5 mg  DAILY


 PO  2/28/18 08:00


 3/28/18 08:59  3/2/18 08:06


5 MG


 


 Metoprolol


 Tartrate


  (Lopressor Tab)  25 mg  BID


 PO  3/1/18 20:00


 3/28/18 08:59  3/2/18 08:06


25 MG


 


 Daptomycin 400 mg/


 Syringe  8 ml @ 4


 mls/min  Q48H


 IV  3/2/18 09:00


 3/16/18 08:59  3/2/18 09:25


4 MLS/MIN











Impression





(1) Acute renal failure


(2) Hydronephrosis


(3) Kidney stones


(4) Pyelonephritis


(5) BPH (benign prostatic hyperplasia)


(6) Renal cyst


(7) Chronic anemia





Recommendations


ACUTE KIDNEY INJURY:


-- BPH resulting in L ureteral obstruction s/p cystoscopy w/ stenting 02/27/18


-- Will need outpatient Urology appointment


-- Kidney function is mildly improved (creatinine 2.2 this am),  electrolyte 

balance is acceptable.  OK to discharge from Nephrology perspective.  I have 

instructed my office staff to schedule outpatient labs and follow up visit 

within 14 days


-- No further Nephrology evaluation needed at this time.  Will sign off.  

Please call if further Nephrology assistance is needed.





ANEMIA:


-- INR is 1.2.  Hgb improved to 8.1 this am





ID:


-- Blood & urine cultures are + for Staph Aureus sensitive to Daptomycin 


-- ID recommends a 4 week course of IV Ancef





RHEUM:


-- Patient has a h/o RA treated w/ Leflunomide and Prednisone therapy


-- Prednisone has been reduced to 5 mg daily





KIDNEY STONES:


-- Will benefit from outpatient metabolic stone evaluation once medically stable





KIDNEY CYSTS:


-- L kidney has complex cyst in the lower pole.  Patient will likely require 

contrast + abdominal CT or MRI once kidney function recovers

## 2018-03-02 NOTE — FAMILY MEDICINE PROGRESS NOTE
Progress Note


Date of Service


Mar 2, 2018.





Subjective


Pt evaluation today including:  conversation w/ patient, physical exam, chart 

review, lab review, review of inpatient medication list


Pain:  Suprapubic pain reported


PO Intake:  Tolerating PO intake


Voiding:  alex catheter in place


Mr. Santos remains with his nagging suprapubic pain that occurs every so often. 

He notes he had to get his alex irrigated this morning as he was experiencing 

worsening discomfort and a sensation of a full bladder. He notes these symptoms 

resolved after the irrigation. He denies fever, chills, n/v and is ambulating 

without difficulty. ,





   Constitutional:  No fever, No chills


   Respiratory:  No cough, No shortness of breath


   Cardiovascular:  No chest pain


   Abdomen:  + pain, No nausea, No vomiting


   All Other Systems:  Reviewed and Negative





Medications





Current Inpatient Medications








 Medications


  (Trade)  Dose


 Ordered  Sig/Juaquin


 Route  Start Time


 Stop Time Status Last Admin


Dose Admin


 


 Acetaminophen


  (Tylenol Tab)  650 mg  Q4H  PRN


 PO  2/25/18 15:45


 3/27/18 15:44   


 


 


 Al Hydrox/Mg


 Hydrox/Simethicone


  (Maalox Max Susp)  15 ml  Q4H  PRN


 PO  2/25/18 15:45


 3/27/18 15:44   


 


 


 Magnesium


 Hydroxide


  (Milk Of


 Magnesia Susp)  30 ml  Q6H  PRN


 PO  2/25/18 15:45


 3/27/18 15:44   


 


 


 Polyethylene


  (Miralax Powder


 Packet)  17 gm  DAILY  PRN


 PO  2/25/18 15:45


 3/27/18 15:44   


 


 


 Ondansetron HCl


  (Zofran Inj)  4 mg  Q6H  PRN


 IV  2/25/18 15:45


 3/27/18 15:44   


 


 


 Aspirin


  (Ecotrin Tab)  81 mg  BID


 PO  2/25/18 20:00


 3/27/18 20:59 Future Hold 2/28/18 08:09


81 MG


 


 Finasteride


  (Proscar Tab)  5 mg  HS


 PO  2/25/18 21:00


 3/27/18 20:59  3/1/18 20:37


5 MG


 


 Folic Acid


  (Folvite Tab)  1 mg  QAM


 PO  2/26/18 08:00


 3/28/18 08:59  3/2/18 08:05


1 MG


 


 Multivitamins


  (Multivitamin


 Tab)  1 tab  QAM


 PO  2/26/18 08:00


 3/28/18 08:59  3/2/18 08:06


1 TAB


 


 Potassium Chloride


  (Klor-Con Tab)  20 meq  BID


 PO  2/25/18 20:00


 3/27/18 20:59  3/2/18 08:05


20 MEQ


 


 Ranitidine HCl


  (zANTac TAB)  150 mg  BID


 PO  2/25/18 20:00


 3/27/18 20:59  3/2/18 08:06


150 MG


 


 Tamsulosin HCl


  (Flomax Cap)  0.4 mg  DAILY


 PO  2/26/18 08:00


 3/28/18 08:59  3/2/18 08:05


0.4 MG


 


 Ferrous Sulfate


  (Feosol Tab)  325 mg  BIDM


 PO  2/25/18 17:00


 3/27/18 17:59  3/2/18 08:04


325 MG


 


 Pantoprazole


 Sodium


  (Protonix Tab)  40 mg  QAM


 PO  2/26/18 08:00


 3/28/18 08:59  3/2/18 08:06


40 MG


 


 Oxycodone/


 Acetaminophen


  (Percocet


 5-325mg Tab)  1 tab  Q4H  PRN


 PO  2/25/18 15:45


 3/11/18 15:44  3/2/18 08:06


1 TAB


 


 Oxybutynin


 Chloride


  (Ditropan Tab)  5 mg  TID


 PO  2/27/18 14:00


 3/29/18 13:59  3/2/18 08:04


5 MG


 


 Prednisone


  (PredniSONE TAB)  5 mg  DAILY


 PO  2/28/18 08:00


 3/28/18 08:59  3/2/18 08:06


5 MG


 


 Metoprolol


 Tartrate


  (Lopressor Tab)  25 mg  BID


 PO  3/1/18 20:00


 3/28/18 08:59  3/2/18 08:06


25 MG


 


 Daptomycin 400 mg/


 Syringe  8 ml @ 4


 mls/min  Q48H


 IV  3/2/18 09:00


 3/16/18 08:59  3/2/18 09:25


4 MLS/MIN











Objective


Vital Signs











  Date Time  Temp Pulse Resp B/P (MAP) Pulse Ox O2 Delivery O2 Flow Rate FiO2


 


3/2/18 11:05      Room Air  


 


3/2/18 10:21     97 Room Air  


 


3/2/18 08:10 36.6 65 20 172/90 (117) 97 Room Air  


 


3/2/18 00:44 36.6 61 20 188/95 (126) 96 Room Air  


 


3/2/18 00:00      Room Air  


 


3/1/18 16:10      Room Air  


 


3/1/18 15:11 36.5 60 16 154/80 (104) 98 Room Air  











Physical Exam


General Appearance:  WD/WN, no apparent distress, + pertinent finding (alex 

catheter in place draining light orange urine)


Respiratory/Chest:  lungs clear, normal breath sounds, no respiratory distress, 

no accessory muscle use


Cardiovascular:  regular rate, rhythm, no edema, no gallop, no JVD


Abdomen:  soft, + pertinent finding (mildly tender over one small area in left 

groin)


Neurologic/Psychiatric:  alert, normal mood/affect, oriented x 3





Laboratory Results





Last 24 Hours








Test


  3/2/18


06:46


 


White Blood Count 6.62 K/uL 


 


Red Blood Count 3.01 M/uL 


 


Hemoglobin 8.1 g/dL 


 


Hematocrit 25.7 % 


 


Mean Corpuscular Volume 85.4 fL 


 


Mean Corpuscular Hemoglobin 26.9 pg 


 


Mean Corpuscular Hemoglobin


Concent 31.5 g/dl 


 


 


RDW Standard Deviation 72.8 fL 


 


RDW Coefficient of Variation 23.9 % 


 


Platelet Count 121 K/uL 


 


Mean Platelet Volume 10.6 fL 


 


Sodium Level 138 mmol/L 


 


Potassium Level 3.8 mmol/L 


 


Chloride Level 108 mmol/L 


 


Carbon Dioxide Level 23 mmol/L 


 


Anion Gap 7.0 mmol/L 


 


Blood Urea Nitrogen 27 mg/dl 


 


Creatinine 2.26 mg/dl 


 


Est Creatinine Clear Calc


Drug Dose 23.8 ml/min 


 


 


Estimated GFR (


American) 31.3 


 


 


Estimated GFR (Non-


American 27.0 


 


 


BUN/Creatinine Ratio 12.1 


 


Random Glucose 87 mg/dl 


 


Calcium Level 8.5 mg/dl 











Assessment and Plan


Mr. Santos is a 77 year old male with a past medical history of rheumatoid 

arthritis on prednisone, BPH, Anemia of chronic disease, HTN, HLD, primary OA 

who was admitted for 12 hours of left-sided CVA pain and suprapubic pain on top 

of a 3 day hx of increasing sediment, pus, and orange color of urine in his 

indwelling catheter. Recent admission to [facility] from 2/2-14/18 for LINDA in setting 

of urinary retention secondary to worsening BPH.  During that admission he was 

found to have left sided hydroureteronephrosis. Was treated for complicated UTI 

with rocephin x 5 days. He had been seen by urology who had discussed 

possibility of inserting a stent but as he clinically improved, they decided to 

opt for outpatient follow up, which has not occurred yet. 





Bacteremia secondary to Pyelonephritis/Left VUJ obstruction/L 

Hydroureteronephrosis


- urine cultures and blood cultures positive x2 for pan-sensitive staph aureus


- thank you to ID for consult


   - currently on daptomycin day 5


   - ECHO pending to rule out endocarditis 


- pt consented for PICC line 


   - as per ID, will d/c on 2 weeks of IV daptomycin 400mg q48h


   - will place PICC line on d/c in dominant arm to preserve non dominant arm 

in case fistula needs to be placed for dialysis


   - one repeat bcx positive for gram positive cocci. Awaiting results of 

second blood culture. 


- CT showed moderate left hydroureteronephrosis with dilatation of the left 

ureter to the level the distal ureter. No ureteral calculi. Moderate left 

perinephric infiltration. 


- thank you to urology for consult


   - left ureteral stent placed 


   - keep alex in - f/u in outpatient setting for TURP once infection resolves


   - recommend 3 weeks of treatment with bactrim after IV abx for suspected 

prostatitis


- Percocet prn q4h for pain management





Acute Kidney Injury


- creatinine continuing to improve - now 2.26. states baseline recently has 

been around 2, but this is far worse than prior relatively recent baseline of 1 

in 2017


- Continue to hold amlodipine (held prior to admission due to concerns it may 

have caused AIN)


- thank you to nephrology for consult


   - ok to d/c from nephro point of view


   - outpatient metabolic stone eval once stable


   - contrast and abdominal CT/MRI for left kidney complex cyst in outpatient 

setting





Anemia


- likely anemia of chronic disease related to RA, worsened by hematuria


- transfused with 1 unit on 2/28


- remaining stable at 8.1


- will continue to monitor


- FOBT negative


- Continue iron supplementation - 325mg ferrous sulfate bid


- Had been given procrit at [facility] as well as 4 units of venofer





BPH, chronic x 15 years, worsening


- Continue flomax and finasteride


- Patient started on indwelling catheter at [facility]





RA


- Continue to hold leflunomide 2/2 recent weight loss


- continue 5mg of prednisone (baseline)


- continue folic acid





HTN


- pt's blood pressure has been on the higher side - as high as 180 systolic


- continue Metoprolol 25 mg BID with hold parameters (home dose is 25mg daily)


- hold ASA 81 mg given low Hgb & hematuria 





DVT Prophylaxis: SCDs


Code: Full


Dispo: remains on med/surg. Pending blood culture results.





Resident Tracking


Resident Involvement:  Resident Care Provided


Care Provided:  Adult Hospital Medicine





History


Resident Physician Supervision Note:





I was present with Dr. Vera during the history and exam. I discussed the 

case with the resident and agree with the findings and plan as documented in 

the note.  Any exceptions or clarifications are listed here. Resting 

comfortably in bed and eager to leave the hospital. At present, 1 +ve blood 

culture present, so will likely need discussion with ID prior to PICC 

placement. At present, continue current abx.

## 2018-03-02 NOTE — ECHOCARDIOGRAM REPORT
*NOTICE TO RECEIVING PARTY AGENCY**  This information is strictly Confidential and protected under 
[place] law.  [place] law prohibits you from making any further disclosure of this 
information unless further disclosure is expressly permitted by the written consent of the person to 
whom it pertains or is authorized by law.  A general authorization for the release of medical or 
other information is not sufficient for this purpose.  Hospital accepts no responsibility if the 
information is made available to any other person, INCLUDING THE PATIENT.



Interpretation Summary

  *  Name: DARRYL CARRILLO  Study Date: 2018 01:27 PM  BP: 188/95 mmHg

  *  MRN: [id number]  Patient Location: .4E\S\E407\S\1  HR: 61

  *  : 1940 (M/d/yyyy)  Gender: Male  Height: 65 in

  *  Age: 77 yrs  Ethnicity: [place]  Weight: 144 lb

  *  Ordering Physician: Odette Vera

  *  Referring Physician: Self, Referred

  *  Performed By: Katty Cheatham RDCS

  *  Accession# [id number]  Account# [id number]

  *  Reason For Study: Staph aureus bacteremia, rule out IE

  *  BSA: 1.7 m2

  *  There is no evidence of a mass or vegetation.  This does not rule out endocarditis.

  *  -- Conclusions --

  *  Left ventricular systolic function is normal.

  *  Diastolic dysfunction, Grade II, consistent with elevated left atrial pressure.

  *  Right ventricular systolic pressure is elevated at 30-40mmHg.

  *  The inferior vena cava is mildly dilated.

Procedure Details

  *  A complete two-dimensional transthoracic echocardiogram was performed (2D, M-mode, Doppler and 
color flow Doppler).

Left Ventricle

  *  The left ventricle is normal in size.

  *  There is normal left ventricular wall thickness.

  *  Left ventricular systolic function is normal.

  *  Ejection Fraction = 55-60%.

  *  Diastolic dysfunction, Grade II, consistent with elevated left atrial pressure.

  *  The left ventricular wall motion is normal.

Right Ventricle

  *  The right ventricle is normal in size and function.

  *  The right ventricular systolic function is normal as assessed by tricuspid annular plane 
systolic excursion (TAPSE) (normal >1.5 cm).

Atria

  *  The left atrial size is normal.

  *  Right atrial size is normal.

Mitral Valve

  *  The mitral valve anatomy is normal.

  *  Significant mitral regurgitation is absent.

Tricuspid Valve

  *  The tricuspid valve anatomy is normal.

  *  There is trace tricuspid regurgitation.

  *  Right ventricular systolic pressure is elevated at 30-40mmHg.

Aortic Valve

  *  The aortic valve is normal in structure and function.

  *  The aortic valve is trileaflet.

  *  No hemodynamically significant valvular aortic stenosis.

  *  No aortic regurgitation is present.

Great Vessels

  *  The aortic root is normal size.

Pericardium/Pleural

  *  There is no pericardial effusion.

Great Vessels

  *  The inferior vena cava is mildly dilated.



MMode 2D Measurements and Calculations

IVSd 0.99 cm



LVIDd 4.8 cm

LVIDs 3.1 cm

LVPWd 1.4 cm



IVS/LVPW 0.73 

FS 35.6 %

EDV(Teich) 106.7 ml

ESV(Teich) 37.3 ml

EF(Teich) 65.0 %



EDV(cubed) 109.5 ml

ESV(cubed) 29.2 ml

EF(cubed) 73.3 %





LV mass(C)d 212.5 grams

LV mass(C)dI 123.5 grams/m\S\2



SV(Teich) 69.4 ml

SI(Teich) 40.3 ml/m\S\2

SV(cubed) 80.3 ml

SI(cubed) 46.7 ml/m\S\2



Ao root diam 3.0 cm

Ao root area 7.0 cm\S\2

ACS 1.9 cm

LA dimension 3.9 cm



asc Aorta Diam 2.5 cm





LA/Ao 1.3 

LVOT diam 2.0 cm

LVOT area 3.1 cm\S\2



LVAd ap4 30.3 cm\S\2

LVLd ap4 8.0 cm

EDV(MOD-sp4) 96.9 ml

EDV(sp4-el) 97.6 ml

LVAs ap4 17.3 cm\S\2

LVLs ap4 7.0 cm

ESV(MOD-sp4) 38.3 ml

ESV(sp4-el) 36.2 ml

EF(MOD-sp4) 60.5 %

EF(sp4-el) 62.9 %



LVAd ap2 27.0 cm\S\2

LVLd ap2 7.9 cm

EDV(MOD-sp2) 80.9 ml

EDV(sp2-el) 78.5 ml

LVAs ap2 15.1 cm\S\2

LVLs ap2 6.6 cm

ESV(MOD-sp2) 30.4 ml

ESV(sp2-el) 29.1 ml

EF(MOD-sp2) 62.4 %

EF(sp2-el) 63.0 %



LVLd %diff -1.16 %

EDV(MOD-bp) 88.7 ml

LVLs %diff -5.74 %

ESV(MOD-bp) 34.3 ml

EF(MOD-bp) 61.4 %





SV(MOD-sp4) 58.6 ml

SI(MOD-sp4) 34.1 ml/m\S\2



SV(MOD-sp2) 50.5 ml

SI(MOD-sp2) 29.4 ml/m\S\2



SV(MOD-bp) 54.4 ml

SI(MOD-bp) 31.6 ml/m\S\2



SV(sp4-el) 61.3 ml

SI(sp4-el) 35.6 ml/m\S\2





SV(sp2-el) 49.4 ml

SI(sp2-el) 28.7 ml/m\S\2













Doppler Measurements and Calculations

MV E max louisa 88.3 cm/sec

MV A max louisa 57.7 cm/sec



MV E/A 1.5 



MV dec time 0.25 sec



Ao V2 max 146.0 cm/sec

Ao max PG 8.5 mmHg

Ao max PG (full) 5.4 mmHg

HIMANSHU(V,A) 1.9 cm\S\2

HIMANSHU(V,D) 1.9 cm\S\2





LV V1 max PG 3.1 mmHg



LV V1 max 87.8 cm/sec



PA V2 max 103.8 cm/sec

PA max PG 4.3 mmHg

PA acc slope 528.1 cm/sec\S\2

PA acc time 0.13 sec



PI max louisa 142.4 cm/sec

PI max PG 8.1 mmHg

PI dec slope 126.1 cm/sec\S\2

PI P1/2t 330.7 msec





TR max louisa 234.5 cm/sec



PA pr(Accel) 22.0 mmHg

## 2018-03-03 VITALS
SYSTOLIC BLOOD PRESSURE: 165 MMHG | HEART RATE: 69 BPM | TEMPERATURE: 98.06 F | OXYGEN SATURATION: 97 % | DIASTOLIC BLOOD PRESSURE: 81 MMHG

## 2018-03-03 VITALS
HEART RATE: 58 BPM | DIASTOLIC BLOOD PRESSURE: 66 MMHG | SYSTOLIC BLOOD PRESSURE: 153 MMHG | TEMPERATURE: 97.88 F | OXYGEN SATURATION: 97 %

## 2018-03-03 LAB
BASOPHILS # BLD: 0.02 K/UL (ref 0–0.2)
BASOPHILS NFR BLD: 0.3 %
BUN SERPL-MCNC: 29 MG/DL (ref 7–18)
CALCIUM SERPL-MCNC: 8.3 MG/DL (ref 8.5–10.1)
CO2 SERPL-SCNC: 20 MMOL/L (ref 21–32)
CREAT SERPL-MCNC: 2.29 MG/DL (ref 0.6–1.4)
EOS ABS #: 0.24 K/UL (ref 0–0.5)
EOSINOPHIL NFR BLD AUTO: 122 K/UL (ref 130–400)
GLUCOSE SERPL-MCNC: 85 MG/DL (ref 70–99)
HCT VFR BLD CALC: 25.1 % (ref 42–52)
HGB BLD-MCNC: 7.8 G/DL (ref 14–18)
IG#: 0.04 K/UL (ref 0–0.02)
IMM GRANULOCYTES NFR BLD AUTO: 12.8 %
INR PPP: 1 (ref 0.9–1.1)
LYMPHOCYTES # BLD: 0.86 K/UL (ref 1.2–3.4)
MCH RBC QN AUTO: 26.8 PG (ref 25–34)
MCHC RBC AUTO-ENTMCNC: 31.1 G/DL (ref 32–36)
MCV RBC AUTO: 86.3 FL (ref 80–100)
MONO ABS #: 0.3 K/UL (ref 0.11–0.59)
MONOCYTES NFR BLD: 4.5 %
NEUT ABS #: 5.27 K/UL (ref 1.4–6.5)
NEUTROPHILS # BLD AUTO: 3.6 %
NEUTROPHILS NFR BLD AUTO: 78.2 %
PMV BLD AUTO: 10 FL (ref 7.4–10.4)
POTASSIUM SERPL-SCNC: 3.8 MMOL/L (ref 3.5–5.1)
RED CELL DISTRIBUTION WIDTH CV: 23.5 % (ref 11.5–14.5)
RED CELL DISTRIBUTION WIDTH SD: 72.9 FL (ref 36.4–46.3)
SODIUM SERPL-SCNC: 138 MMOL/L (ref 136–145)
WBC # BLD AUTO: 6.73 K/UL (ref 4.8–10.8)

## 2018-03-03 RX ADMIN — OXYBUTYNIN CHLORIDE SCH MG: 5 TABLET ORAL at 19:57

## 2018-03-03 RX ADMIN — OXYBUTYNIN CHLORIDE SCH MG: 5 TABLET ORAL at 14:05

## 2018-03-03 RX ADMIN — RANITIDINE SCH MG: 150 TABLET ORAL at 19:59

## 2018-03-03 RX ADMIN — OXYCODONE HYDROCHLORIDE AND ACETAMINOPHEN PRN TAB: 5; 325 TABLET ORAL at 04:45

## 2018-03-03 RX ADMIN — RANITIDINE SCH MG: 150 TABLET ORAL at 08:31

## 2018-03-03 RX ADMIN — METOPROLOL TARTRATE SCH MG: 25 TABLET, FILM COATED ORAL at 08:31

## 2018-03-03 RX ADMIN — POTASSIUM CHLORIDE SCH MEQ: 1500 TABLET, EXTENDED RELEASE ORAL at 08:30

## 2018-03-03 RX ADMIN — FERROUS SULFATE TAB EC 325 MG (65 MG FE EQUIVALENT) SCH MG: 325 (65 FE) TABLET DELAYED RESPONSE at 08:31

## 2018-03-03 RX ADMIN — OXYCODONE HYDROCHLORIDE AND ACETAMINOPHEN PRN TAB: 5; 325 TABLET ORAL at 08:33

## 2018-03-03 RX ADMIN — OXYCODONE HYDROCHLORIDE AND ACETAMINOPHEN PRN TAB: 5; 325 TABLET ORAL at 14:06

## 2018-03-03 RX ADMIN — OXYBUTYNIN CHLORIDE SCH MG: 5 TABLET ORAL at 08:31

## 2018-03-03 RX ADMIN — OXYCODONE HYDROCHLORIDE AND ACETAMINOPHEN PRN TAB: 5; 325 TABLET ORAL at 19:57

## 2018-03-03 RX ADMIN — FERROUS SULFATE TAB EC 325 MG (65 MG FE EQUIVALENT) SCH MG: 325 (65 FE) TABLET DELAYED RESPONSE at 17:35

## 2018-03-03 RX ADMIN — POTASSIUM CHLORIDE SCH MEQ: 1500 TABLET, EXTENDED RELEASE ORAL at 19:58

## 2018-03-03 RX ADMIN — FINASTERIDE SCH MG: 5 TABLET, FILM COATED ORAL at 19:58

## 2018-03-03 RX ADMIN — PANTOPRAZOLE SCH MG: 40 TABLET, DELAYED RELEASE ORAL at 08:31

## 2018-03-03 RX ADMIN — Medication SCH TAB: at 08:31

## 2018-03-03 RX ADMIN — TAMSULOSIN HYDROCHLORIDE SCH MG: 0.4 CAPSULE ORAL at 08:30

## 2018-03-03 NOTE — FAMILY MEDICINE PROGRESS NOTE
Progress Note


Date of Service


Mar 3, 2018.





Subjective


Pt evaluation today including:  conversation w/ patient, physical exam, chart 

review, lab review, review of inpatient medication list


Pain:  No pain reported


PO Intake:  Tolerating PO intake


Voiding:  alex catheter in place


Mr. Santos reports he feels well today. His only concern is white discharge 

coming from his urethra, which he states has been present for a number of days, 

even before they changed his catheter on Monday. He denies abdominal pain, fever

, n/v, chest pain, SOB, or  dizziness whilst ambulating.





   Constitutional:  No fever, No chills


   Cardiovascular:  No chest pain


   Abdomen:  No pain, No nausea, No vomiting


   All Other Systems:  Reviewed and Negative





Medications





Current Inpatient Medications








 Medications


  (Trade)  Dose


 Ordered  Sig/Juaquin


 Route  Start Time


 Stop Time Status Last Admin


Dose Admin


 


 Acetaminophen


  (Tylenol Tab)  650 mg  Q4H  PRN


 PO  2/25/18 15:45


 3/27/18 15:44   


 


 


 Al Hydrox/Mg


 Hydrox/Simethicone


  (Maalox Max Susp)  15 ml  Q4H  PRN


 PO  2/25/18 15:45


 3/27/18 15:44   


 


 


 Magnesium


 Hydroxide


  (Milk Of


 Magnesia Susp)  30 ml  Q6H  PRN


 PO  2/25/18 15:45


 3/27/18 15:44   


 


 


 Polyethylene


  (Miralax Powder


 Packet)  17 gm  DAILY  PRN


 PO  2/25/18 15:45


 3/27/18 15:44   


 


 


 Ondansetron HCl


  (Zofran Inj)  4 mg  Q6H  PRN


 IV  2/25/18 15:45


 3/27/18 15:44   


 


 


 Aspirin


  (Ecotrin Tab)  81 mg  BID


 PO  2/25/18 20:00


 3/27/18 20:59 Future Hold 2/28/18 08:09


81 MG


 


 Finasteride


  (Proscar Tab)  5 mg  HS


 PO  2/25/18 21:00


 3/27/18 20:59  3/2/18 20:25


5 MG


 


 Folic Acid


  (Folvite Tab)  1 mg  QAM


 PO  2/26/18 08:00


 3/28/18 08:59  3/3/18 08:31


1 MG


 


 Multivitamins


  (Multivitamin


 Tab)  1 tab  QAM


 PO  2/26/18 08:00


 3/28/18 08:59  3/3/18 08:31


1 TAB


 


 Potassium Chloride


  (Klor-Con Tab)  20 meq  BID


 PO  2/25/18 20:00


 3/27/18 20:59  3/3/18 08:30


20 MEQ


 


 Ranitidine HCl


  (zANTac TAB)  150 mg  BID


 PO  2/25/18 20:00


 3/27/18 20:59  3/3/18 08:31


150 MG


 


 Tamsulosin HCl


  (Flomax Cap)  0.4 mg  DAILY


 PO  2/26/18 08:00


 3/28/18 08:59  3/3/18 08:30


0.4 MG


 


 Ferrous Sulfate


  (Feosol Tab)  325 mg  BIDM


 PO  2/25/18 17:00


 3/27/18 17:59  3/3/18 17:35


325 MG


 


 Pantoprazole


 Sodium


  (Protonix Tab)  40 mg  QAM


 PO  2/26/18 08:00


 3/28/18 08:59  3/3/18 08:31


40 MG


 


 Oxycodone/


 Acetaminophen


  (Percocet


 5-325mg Tab)  1 tab  Q4H  PRN


 PO  2/25/18 15:45


 3/11/18 15:44  3/3/18 14:06


1 TAB


 


 Oxybutynin


 Chloride


  (Ditropan Tab)  5 mg  TID


 PO  2/27/18 14:00


 3/29/18 13:59  3/3/18 14:05


5 MG


 


 Prednisone


  (PredniSONE TAB)  5 mg  DAILY


 PO  2/28/18 08:00


 3/28/18 08:59  3/3/18 08:31


5 MG


 


 Metoprolol


 Tartrate


  (Lopressor Tab)  25 mg  BID


 PO  3/1/18 20:00


 3/28/18 08:59  3/3/18 08:31


25 MG


 


 Daptomycin 400 mg/


 Syringe  8 ml @ 4


 mls/min  Q48H


 IV  3/2/18 09:00


 3/16/18 08:59  3/2/18 09:25


4 MLS/MIN











Objective


Vital Signs











  Date Time  Temp Pulse Resp B/P (MAP) Pulse Ox O2 Delivery O2 Flow Rate FiO2


 


3/3/18 16:24      Room Air  


 


3/3/18 15:19 36.7 69 20 165/81 (109) 97   


 


3/3/18 10:09      Room Air  


 


3/3/18 07:39 36.6 58 20 153/66 (95) 97   


 


3/3/18 00:00      Room Air  


 


3/2/18 23:49 36.6 59 18 176/81 (112) 96 Room Air  











Physical Exam


General Appearance:  WD/WN, no apparent distress


Respiratory/Chest:  lungs clear, normal breath sounds


Cardiovascular:  regular rate, rhythm, no edema


Abdomen:  non tender, soft


Neurologic/Psychiatric:  alert, normal mood/affect, oriented x 3





Laboratory Results





Last 24 Hours








Test


  3/3/18


00:00 3/3/18


05:28


 


Urine Color YELLOW  


 


Urine Appearance CLEAR  


 


Urine pH 6.5  


 


Urine Specific Gravity 1.010  


 


Urine Protein 1+  


 


Urine Glucose (UA) NEG  


 


Urine Ketones NEG  


 


Urine Occult Blood 2+  


 


Urine Nitrite NEG  


 


Urine Bilirubin NEG  


 


Urine Urobilinogen NEG  


 


Urine Leukocyte Esterase MODERATE  


 


Urine WBC (Auto) >30 /hpf  


 


Urine RBC (Auto) >30 /hpf  


 


Urine Hyaline Casts (Auto) 1-5 /lpf  


 


Urine Epithelial Cells (Auto) 20-30 /lpf  


 


Urine Bacteria (Auto) NEG  


 


White Blood Count  6.73 K/uL 


 


Red Blood Count  2.91 M/uL 


 


Hemoglobin  7.8 g/dL 


 


Hematocrit  25.1 % 


 


Mean Corpuscular Volume  86.3 fL 


 


Mean Corpuscular Hemoglobin  26.8 pg 


 


Mean Corpuscular Hemoglobin


Concent 


  31.1 g/dl 


 


 


Platelet Count  122 K/uL 


 


Mean Platelet Volume  10.0 fL 


 


Neutrophils (%) (Auto)  78.2 % 


 


Lymphocytes (%) (Auto)  12.8 % 


 


Monocytes (%) (Auto)  4.5 % 


 


Eosinophils (%) (Auto)  3.6 % 


 


Basophils (%) (Auto)  0.3 % 


 


Neutrophils # (Auto)  5.27 K/uL 


 


Lymphocytes # (Auto)  0.86 K/uL 


 


Monocytes # (Auto)  0.30 K/uL 


 


Eosinophils # (Auto)  0.24 K/uL 


 


Basophils # (Auto)  0.02 K/uL 


 


RDW Standard Deviation  72.9 fL 


 


RDW Coefficient of Variation  23.5 % 


 


Immature Granulocyte % (Auto)  0.6 % 


 


Immature Granulocyte # (Auto)  0.04 K/uL 


 


Anisocytosis  PRESENT 


 


Prothrombin Time  11.0 SECONDS 


 


Prothromb Time International


Ratio 


  1.0 


 


 


Sodium Level  138 mmol/L 


 


Potassium Level  3.8 mmol/L 


 


Chloride Level  108 mmol/L 


 


Carbon Dioxide Level  20 mmol/L 


 


Anion Gap  10.0 mmol/L 


 


Blood Urea Nitrogen  29 mg/dl 


 


Creatinine  2.29 mg/dl 


 


Est Creatinine Clear Calc


Drug Dose 


  23.5 ml/min 


 


 


Estimated GFR (


American) 


  30.8 


 


 


Estimated GFR (Non-


American 


  26.5 


 


 


BUN/Creatinine Ratio  12.5 


 


Random Glucose  85 mg/dl 


 


Calcium Level  8.3 mg/dl 











Assessment and Plan


Mr. Santos is a 77 year old male with a past medical history of rheumatoid 

arthritis on prednisone, BPH, Anemia of chronic disease, HTN, HLD, primary OA 

who was admitted for 12 hours of left-sided CVA pain and suprapubic pain on top 

of a 3 day hx of increasing sediment, pus, and orange color of urine in his 

indwelling catheter. Recent admission to [facility] from 2/2-14/18 for LINDA in setting 

of urinary retention secondary to worsening BPH.  During that admission he was 

found to have left sided hydroureteronephrosis. Was treated for complicated UTI 

with rocephin x 5 days. He had been seen by urology who had discussed 

possibility of inserting a stent but as he clinically improved, they decided to 

opt for outpatient follow up, which has not occurred yet. 





Bacteremia secondary to Pyelonephritis/Left VUJ obstruction/L 

Hydroureteronephrosis


- urine cultures and blood cultures positive x2 for pan-sensitive staph aureus


- thank you to ID for consult


   - currently on daptomycin day 6


   - ECHO negative for endocarditis 


- pt consented for PICC line 


   - as per ID, will d/c on 2 weeks of IV daptomycin 400mg q48h


   - will place PICC line on d/c in dominant arm to preserve non dominant arm 

in case fistula needs to be placed for dialysis


   - blood cultures positive x2 again, will repeat again today. Awaiting normal 

blood cultures to place PICC line


- urethral swab and culture ordered as well


- CT showed moderate left hydroureteronephrosis with dilatation of the left 

ureter to the level the distal ureter. No ureteral calculi. Moderate left 

perinephric infiltration. 


- thank you to urology for consult


   - left ureteral stent placed 


   - keep alex in - f/u in outpatient setting for TURP once infection resolves


   - recommend 3 weeks of treatment with bactrim after IV abx for suspected 

prostatitis


- Percocet prn q4h for pain management





Acute Kidney Injury


- creatinine stable at 2.29. states baseline recently has been around 2, but 

this is far worse than prior relatively recent baseline of 1 in 2017


- Continue to hold amlodipine (held prior to admission due to concerns it may 

have caused AIN)


- thank you to nephrology for consult


   - ok to d/c from nephro point of view


   - outpatient metabolic stone eval once stable


   - contrast and abdominal CT/MRI for left kidney complex cyst in outpatient 

setting





Anemia


- likely anemia of chronic disease related to RA, worsened by hematuria


- transfused with 1 unit on 2/28


- decreased slightly today from 8.1 to 7.8


- will continue to monitor


- FOBT negative


- Continue iron supplementation - 325mg ferrous sulfate bid


- Had been given procrit at [facility] as well as 4 units of venofer





BPH, chronic x 15 years, worsening


- Continue flomax and finasteride


- Patient started on indwelling catheter at [facility]





RA


- Continue to hold leflunomide 2/2 recent weight loss


- continue 5mg of prednisone (baseline)


- continue folic acid





HTN


- pt's blood pressure has been on the higher side - as high as 180 systolic


- change Metoprolol 25mg BID to daily and add 120mg ER of diltiazem as systolic 

pressure elevated, but diastolic normal


- hold ASA 81 mg given low Hgb & hematuria 





DVT Prophylaxis: SCDs


Code: Full


Dispo: remains on med/surg. Pending blood culture results.





Resident Tracking


Resident Involvement:  Resident Care Provided


Care Provided:  Adult Hospital Medicine





Assessment/Plan


Resident Physician Supervision Note:





I was present with Dr. Vera during the history and exam. I discussed the 

case with the resident and agree with the findings and plan as documented in 

the note.  Any exceptions or clarifications are listed here.





Pt reports improved fatigue and afebrile. Hgb slightly down, will check again 

and consider transfusion. Awaiting BCx results as last set 2x +ve.

## 2018-03-04 VITALS
OXYGEN SATURATION: 94 % | TEMPERATURE: 97.88 F | SYSTOLIC BLOOD PRESSURE: 132 MMHG | HEART RATE: 45 BPM | DIASTOLIC BLOOD PRESSURE: 63 MMHG

## 2018-03-04 VITALS
DIASTOLIC BLOOD PRESSURE: 78 MMHG | TEMPERATURE: 98.06 F | SYSTOLIC BLOOD PRESSURE: 167 MMHG | OXYGEN SATURATION: 97 % | HEART RATE: 69 BPM

## 2018-03-04 VITALS — TEMPERATURE: 98.42 F | DIASTOLIC BLOOD PRESSURE: 66 MMHG | SYSTOLIC BLOOD PRESSURE: 130 MMHG | HEART RATE: 54 BPM

## 2018-03-04 VITALS
HEART RATE: 51 BPM | OXYGEN SATURATION: 94 % | DIASTOLIC BLOOD PRESSURE: 75 MMHG | TEMPERATURE: 98.42 F | SYSTOLIC BLOOD PRESSURE: 122 MMHG

## 2018-03-04 VITALS — HEART RATE: 56 BPM | TEMPERATURE: 98.42 F | SYSTOLIC BLOOD PRESSURE: 124 MMHG | DIASTOLIC BLOOD PRESSURE: 70 MMHG

## 2018-03-04 VITALS
TEMPERATURE: 99.14 F | DIASTOLIC BLOOD PRESSURE: 75 MMHG | SYSTOLIC BLOOD PRESSURE: 165 MMHG | HEART RATE: 69 BPM | OXYGEN SATURATION: 97 %

## 2018-03-04 VITALS — OXYGEN SATURATION: 97 %

## 2018-03-04 LAB
BUN SERPL-MCNC: 30 MG/DL (ref 7–18)
CALCIUM SERPL-MCNC: 8.3 MG/DL (ref 8.5–10.1)
CO2 SERPL-SCNC: 26 MMOL/L (ref 21–32)
CREAT SERPL-MCNC: 2.61 MG/DL (ref 0.6–1.4)
EOSINOPHIL NFR BLD AUTO: 132 K/UL (ref 130–400)
GLUCOSE SERPL-MCNC: 92 MG/DL (ref 70–99)
HCT VFR BLD CALC: 23.6 % (ref 42–52)
HGB BLD-MCNC: 7.3 G/DL (ref 14–18)
MCH RBC QN AUTO: 26.4 PG (ref 25–34)
MCHC RBC AUTO-ENTMCNC: 30.9 G/DL (ref 32–36)
MCV RBC AUTO: 85.2 FL (ref 80–100)
PMV BLD AUTO: 9.2 FL (ref 7.4–10.4)
POTASSIUM SERPL-SCNC: 4.3 MMOL/L (ref 3.5–5.1)
RED CELL DISTRIBUTION WIDTH CV: 23.6 % (ref 11.5–14.5)
RED CELL DISTRIBUTION WIDTH SD: 72.5 FL (ref 36.4–46.3)
SODIUM SERPL-SCNC: 136 MMOL/L (ref 136–145)
WBC # BLD AUTO: 7.23 K/UL (ref 4.8–10.8)

## 2018-03-04 RX ADMIN — OXYCODONE HYDROCHLORIDE AND ACETAMINOPHEN PRN TAB: 5; 325 TABLET ORAL at 15:54

## 2018-03-04 RX ADMIN — TAMSULOSIN HYDROCHLORIDE SCH MG: 0.4 CAPSULE ORAL at 07:41

## 2018-03-04 RX ADMIN — OXYCODONE HYDROCHLORIDE AND ACETAMINOPHEN PRN TAB: 5; 325 TABLET ORAL at 07:41

## 2018-03-04 RX ADMIN — OXYCODONE HYDROCHLORIDE AND ACETAMINOPHEN PRN TAB: 5; 325 TABLET ORAL at 20:08

## 2018-03-04 RX ADMIN — METOPROLOL TARTRATE SCH MG: 25 TABLET, FILM COATED ORAL at 07:42

## 2018-03-04 RX ADMIN — RANITIDINE SCH MG: 150 TABLET ORAL at 20:07

## 2018-03-04 RX ADMIN — DAPTOMYCIN SCH MLS/MIN: 50 INJECTION, POWDER, LYOPHILIZED, FOR SOLUTION INTRAVENOUS at 07:42

## 2018-03-04 RX ADMIN — OXYBUTYNIN CHLORIDE SCH MG: 5 TABLET ORAL at 07:41

## 2018-03-04 RX ADMIN — RANITIDINE SCH MG: 150 TABLET ORAL at 07:41

## 2018-03-04 RX ADMIN — PANTOPRAZOLE SCH MG: 40 TABLET, DELAYED RELEASE ORAL at 07:41

## 2018-03-04 RX ADMIN — FERROUS SULFATE TAB EC 325 MG (65 MG FE EQUIVALENT) SCH MG: 325 (65 FE) TABLET DELAYED RESPONSE at 08:19

## 2018-03-04 RX ADMIN — OXYBUTYNIN CHLORIDE SCH MG: 5 TABLET ORAL at 20:07

## 2018-03-04 RX ADMIN — POTASSIUM CHLORIDE SCH MEQ: 1500 TABLET, EXTENDED RELEASE ORAL at 20:07

## 2018-03-04 RX ADMIN — POTASSIUM CHLORIDE SCH MEQ: 1500 TABLET, EXTENDED RELEASE ORAL at 07:41

## 2018-03-04 RX ADMIN — FERROUS SULFATE TAB EC 325 MG (65 MG FE EQUIVALENT) SCH MG: 325 (65 FE) TABLET DELAYED RESPONSE at 15:53

## 2018-03-04 RX ADMIN — OXYBUTYNIN CHLORIDE SCH MG: 5 TABLET ORAL at 13:19

## 2018-03-04 RX ADMIN — Medication SCH TAB: at 07:41

## 2018-03-04 RX ADMIN — FINASTERIDE SCH MG: 5 TABLET, FILM COATED ORAL at 20:07

## 2018-03-04 NOTE — FAMILY MEDICINE PROGRESS NOTE
Progress Note


Date of Service


Mar 4, 2018.





Subjective


Pt evaluation today including:  conversation w/ patient, physical exam, chart 

review, lab review, review of inpatient medication list


Pain:  No pain reported


PO Intake:  Tolerating PO intake


Voiding:  alex catheter in place


Mr. Santos reports he feels well today. His only complaint is his pain in his 

buttocks upon sitting for an extended period of time. He denies fever, chills, n

/v, abdominal pain and is eating and drinking well.  He notes he does not have 

black tarry stools and has not noticed any blood in his stool.





   Constitutional:  No fever, No chills


   Respiratory:  No shortness of breath


   Cardiovascular:  No chest pain


   Abdomen:  No pain, No nausea, No vomiting, No GI bleeding


   All Other Systems:  Reviewed and Negative





Medications





Current Inpatient Medications








 Medications


  (Trade)  Dose


 Ordered  Sig/Juaquin


 Route  Start Time


 Stop Time Status Last Admin


Dose Admin


 


 Acetaminophen


  (Tylenol Tab)  650 mg  Q4H  PRN


 PO  2/25/18 15:45


 3/27/18 15:44   


 


 


 Al Hydrox/Mg


 Hydrox/Simethicone


  (Maalox Max Susp)  15 ml  Q4H  PRN


 PO  2/25/18 15:45


 3/27/18 15:44   


 


 


 Magnesium


 Hydroxide


  (Milk Of


 Magnesia Susp)  30 ml  Q6H  PRN


 PO  2/25/18 15:45


 3/27/18 15:44   


 


 


 Polyethylene


  (Miralax Powder


 Packet)  17 gm  DAILY  PRN


 PO  2/25/18 15:45


 3/27/18 15:44   


 


 


 Ondansetron HCl


  (Zofran Inj)  4 mg  Q6H  PRN


 IV  2/25/18 15:45


 3/27/18 15:44   


 


 


 Aspirin


  (Ecotrin Tab)  81 mg  BID


 PO  2/25/18 20:00


 3/27/18 20:59 Future Hold 2/28/18 08:09


81 MG


 


 Finasteride


  (Proscar Tab)  5 mg  HS


 PO  2/25/18 21:00


 3/27/18 20:59  3/3/18 19:58


5 MG


 


 Folic Acid


  (Folvite Tab)  1 mg  QAM


 PO  2/26/18 08:00


 3/28/18 08:59  3/4/18 07:41


1 MG


 


 Multivitamins


  (Multivitamin


 Tab)  1 tab  QAM


 PO  2/26/18 08:00


 3/28/18 08:59  3/4/18 07:41


1 TAB


 


 Potassium Chloride


  (Klor-Con Tab)  20 meq  BID


 PO  2/25/18 20:00


 3/27/18 20:59  3/4/18 07:41


20 MEQ


 


 Ranitidine HCl


  (zANTac TAB)  150 mg  BID


 PO  2/25/18 20:00


 3/27/18 20:59  3/4/18 07:41


150 MG


 


 Tamsulosin HCl


  (Flomax Cap)  0.4 mg  DAILY


 PO  2/26/18 08:00


 3/28/18 08:59  3/4/18 07:41


0.4 MG


 


 Ferrous Sulfate


  (Feosol Tab)  325 mg  BIDM


 PO  2/25/18 17:00


 3/27/18 17:59  3/4/18 08:19


325 MG


 


 Pantoprazole


 Sodium


  (Protonix Tab)  40 mg  QAM


 PO  2/26/18 08:00


 3/28/18 08:59  3/4/18 07:41


40 MG


 


 Oxycodone/


 Acetaminophen


  (Percocet


 5-325mg Tab)  1 tab  Q4H  PRN


 PO  2/25/18 15:45


 3/11/18 15:44  3/4/18 07:41


1 TAB


 


 Oxybutynin


 Chloride


  (Ditropan Tab)  5 mg  TID


 PO  2/27/18 14:00


 3/29/18 13:59  3/4/18 07:41


5 MG


 


 Prednisone


  (PredniSONE TAB)  5 mg  DAILY


 PO  2/28/18 08:00


 3/28/18 08:59  3/4/18 07:41


5 MG


 


 Daptomycin 400 mg/


 Syringe  8 ml @ 4


 mls/min  Q48H


 IV  3/2/18 09:00


 3/16/18 08:59  3/4/18 07:42


4 MLS/MIN


 


 Metoprolol


 Tartrate


  (Lopressor Tab)  25 mg  DAILY


 PO  3/4/18 08:00


 3/28/18 08:59  3/4/18 07:42


25 MG


 


 Diltiazem HCl


  (Dilacor Xr Cap)  120 mg  QAM


 PO  3/4/18 08:00


 4/3/18 07:59  3/4/18 08:19


120 MG











Objective


Vital Signs











  Date Time  Temp Pulse Resp B/P (MAP) Pulse Ox O2 Delivery O2 Flow Rate FiO2


 


3/4/18 07:38 37.3 69 20 165/75 (105) 97   


 


3/4/18 00:29 36.7 69 20 167/78 (107) 97 Room Air  


 


3/4/18 00:00     97 Room Air  


 


3/3/18 16:24      Room Air  


 


3/3/18 15:19 36.7 69 20 165/81 (109) 97   


 


3/3/18 10:09      Room Air  











Physical Exam


General Appearance:  WD/WN, no apparent distress, + pertinent finding (alex 

catheter in place, draining moderately dark yellow urine)


Respiratory/Chest:  chest non-tender, lungs clear, normal breath sounds


Cardiovascular:  regular rate, rhythm, no edema


Abdomen:  non tender, soft, + pertinent finding (tender over one spot on left-

middle suprapubic region)





Laboratory Results





Last 24 Hours








Test


  3/4/18


05:46


 


White Blood Count 7.23 K/uL 


 


Red Blood Count 2.77 M/uL 


 


Hemoglobin 7.3 g/dL 


 


Hematocrit 23.6 % 


 


Mean Corpuscular Volume 85.2 fL 


 


Mean Corpuscular Hemoglobin 26.4 pg 


 


Mean Corpuscular Hemoglobin


Concent 30.9 g/dl 


 


 


RDW Standard Deviation 72.5 fL 


 


RDW Coefficient of Variation 23.6 % 


 


Platelet Count 132 K/uL 


 


Mean Platelet Volume 9.2 fL 


 


Sodium Level 136 mmol/L 


 


Potassium Level 4.3 mmol/L 


 


Chloride Level 106 mmol/L 


 


Carbon Dioxide Level 26 mmol/L 


 


Anion Gap 5.0 mmol/L 


 


Blood Urea Nitrogen 30 mg/dl 


 


Creatinine 2.61 mg/dl 


 


Est Creatinine Clear Calc


Drug Dose 20.6 ml/min 


 


 


Estimated GFR (


American) 26.3 


 


 


Estimated GFR (Non-


American 22.7 


 


 


BUN/Creatinine Ratio 11.6 


 


Random Glucose 92 mg/dl 


 


Calcium Level 8.3 mg/dl 











Assessment and Plan


Mr. Santos is a 77 year old male with a past medical history of rheumatoid 

arthritis on prednisone, BPH, Anemia of chronic disease, HTN, HLD, primary OA 

who was admitted for 12 hours of left-sided CVA pain and suprapubic pain on top 

of a 3 day hx of increasing sediment, pus, and orange color of urine in his 

indwelling catheter. Recent admission to [facility] from 2/2-14/18 for LINDA in setting 

of urinary retention secondary to worsening BPH.  During that admission he was 

found to have left sided hydroureteronephrosis. Was treated for complicated UTI 

with rocephin x 5 days. He had been seen by urology who had discussed 

possibility of inserting a stent but as he clinically improved, they decided to 

opt for outpatient follow up, which has not occurred yet. 





Bacteremia secondary to Pyelonephritis/Left VUJ obstruction/L 

Hydroureteronephrosis


- urine cultures and blood cultures positive x2 for pan-sensitive staph aureus


- thank you to ID for consult


   - currently on daptomycin day 7


   - ECHO negative for endocarditis 


- pt consented for PICC line 


   - as per ID, will d/c on 2 weeks of IV daptomycin 400mg q48h


   - will place PICC line on d/c in dominant arm to preserve non dominant arm 

in case fistula needs to be placed for dialysis


   - PICC line consent signed and in chart


   - blood cultures positive x2 again, repeated again x2 yesterday and this 

morning.  Awaiting normal blood cultures to place PICC line


- urethral swab and culture pending. Repeat urine culture negative


- CT showed moderate left hydroureteronephrosis with dilatation of the left 

ureter to the level the distal ureter. No ureteral calculi. Moderate left 

perinephric infiltration. 


- thank you to urology for consult


   - left ureteral stent placed 


   - keep alex in - f/u in outpatient setting for TURP once infection resolves


   - recommend 3 weeks of treatment with bactrim after IV abx for suspected 

prostatitis


- Percocet prn q4h for pain management





Acute Kidney Injury


- creatinine at 2.6. states baseline recently has been around 2, but this is 

far worse than prior relatively recent baseline of 1 in 2017


- Continue to hold amlodipine (held prior to admission due to concerns it may 

have caused AIN)


- thank you to nephrology for consult


   - ok to d/c from nephro point of view


   - outpatient metabolic stone eval once stable


   - contrast and abdominal CT/MRI for left kidney complex cyst in outpatient 

setting





Anemia


- likely anemia of chronic disease related to RA, worsened by hematuria


- transfused with 1 unit on 2/28


- decreased today from 7.8 to 7.3, likely secondary to repeated blood draws


- will transfuse one more unit of prbcs and monitor


- FOBT negative


- Continue iron supplementation - 325mg ferrous sulfate bid


- Had been given procrit at [facility] as well as 4 units of venofer





BPH, chronic x 15 years, worsening


- Continue flomax and finasteride


- Patient started on indwelling catheter at [facility]





RA


- Continue to hold leflunomide 2/2 recent weight loss


- continue 5mg of prednisone (baseline)


- continue folic acid





HTN


- pt's blood pressure has been on the higher side - as high as 180 systolic


- continue Metoprolol 25mg daily and daily diltiazem as systolic pressure 

elevated, but diastolic normal


   -> decrease diltiazem ER from 120mg to 60mg as blood pressure decreased to 

normal levels, but HR dropped to 45


   -> monitor tomorrow


- hold ASA 81 mg given low Hgb & hematuria 





DVT Prophylaxis: SCDs


Code: Full


Dispo: remains on med/surg. Pending blood culture results.





Resident Tracking


Resident Involvement:  Resident Care Provided


Care Provided:  Adult [facility] Medicine





Assessment/Plan


Resident Physician Supervision Note:





I was present with Dr. Vera during the history and exam. I discussed the 

case with the resident and agree with the findings and plan as documented in 

the note.  Any exceptions or clarifications are listed here.





Hgb has been decreasing steadily, likely 2/2 hematuria and frequent blood draws 

for cultures. 1U PRBC given 2/2 history ischemic nephropathy at Hgb of 7.5. 

Otherwise, tolerating IV abx well and still awaiting negative BCx for PICC 

placement, next set will return tomorrow. Can consider drawing another set then 

if +ve for 48 hrs hence.

## 2018-03-05 VITALS
DIASTOLIC BLOOD PRESSURE: 79 MMHG | OXYGEN SATURATION: 91 % | TEMPERATURE: 97.88 F | HEART RATE: 62 BPM | SYSTOLIC BLOOD PRESSURE: 162 MMHG

## 2018-03-05 VITALS
SYSTOLIC BLOOD PRESSURE: 164 MMHG | DIASTOLIC BLOOD PRESSURE: 72 MMHG | TEMPERATURE: 97.52 F | OXYGEN SATURATION: 99 % | HEART RATE: 51 BPM

## 2018-03-05 VITALS
DIASTOLIC BLOOD PRESSURE: 83 MMHG | TEMPERATURE: 98.06 F | SYSTOLIC BLOOD PRESSURE: 179 MMHG | OXYGEN SATURATION: 97 % | HEART RATE: 66 BPM

## 2018-03-05 VITALS
HEART RATE: 59 BPM | DIASTOLIC BLOOD PRESSURE: 80 MMHG | OXYGEN SATURATION: 97 % | SYSTOLIC BLOOD PRESSURE: 149 MMHG | TEMPERATURE: 98.24 F

## 2018-03-05 VITALS
OXYGEN SATURATION: 99 % | SYSTOLIC BLOOD PRESSURE: 164 MMHG | DIASTOLIC BLOOD PRESSURE: 72 MMHG | TEMPERATURE: 97.52 F | HEART RATE: 51 BPM

## 2018-03-05 LAB
BASOPHILS # BLD: 0.01 K/UL (ref 0–0.2)
BASOPHILS # BLD: 0.02 K/UL (ref 0–0.2)
BASOPHILS NFR BLD: 0.1 %
BASOPHILS NFR BLD: 0.3 %
BUN SERPL-MCNC: 35 MG/DL (ref 7–18)
BUN SERPL-MCNC: 35 MG/DL (ref 7–18)
CALCIUM SERPL-MCNC: 8.7 MG/DL (ref 8.5–10.1)
CALCIUM SERPL-MCNC: 8.7 MG/DL (ref 8.5–10.1)
CO2 SERPL-SCNC: 22 MMOL/L (ref 21–32)
CO2 SERPL-SCNC: 23 MMOL/L (ref 21–32)
CREAT SERPL-MCNC: 3.11 MG/DL (ref 0.6–1.4)
CREAT SERPL-MCNC: 3.29 MG/DL (ref 0.6–1.4)
CREAT UR-MCNC: 37 MG/DL
EOS ABS #: 0.1 K/UL (ref 0–0.5)
EOS ABS #: 0.22 K/UL (ref 0–0.5)
EOSINOPHIL NFR BLD AUTO: 163 K/UL (ref 130–400)
EOSINOPHIL NFR BLD AUTO: 171 K/UL (ref 130–400)
GLUCOSE SERPL-MCNC: 103 MG/DL (ref 70–99)
GLUCOSE SERPL-MCNC: 129 MG/DL (ref 70–99)
HCT VFR BLD CALC: 26.4 % (ref 42–52)
HCT VFR BLD CALC: 27.3 % (ref 42–52)
HGB BLD-MCNC: 8.6 G/DL (ref 14–18)
HGB BLD-MCNC: 8.8 G/DL (ref 14–18)
IG#: 0.05 K/UL (ref 0–0.02)
IG#: 0.05 K/UL (ref 0–0.02)
IMM GRANULOCYTES NFR BLD AUTO: 10.6 %
IMM GRANULOCYTES NFR BLD AUTO: 12.5 %
LYMPHOCYTES # BLD: 0.74 K/UL (ref 1.2–3.4)
LYMPHOCYTES # BLD: 0.87 K/UL (ref 1.2–3.4)
MCH RBC QN AUTO: 27.5 PG (ref 25–34)
MCH RBC QN AUTO: 27.8 PG (ref 25–34)
MCHC RBC AUTO-ENTMCNC: 32.2 G/DL (ref 32–36)
MCHC RBC AUTO-ENTMCNC: 32.6 G/DL (ref 32–36)
MCV RBC AUTO: 85.3 FL (ref 80–100)
MCV RBC AUTO: 85.4 FL (ref 80–100)
MONO ABS #: 0.33 K/UL (ref 0.11–0.59)
MONO ABS #: 0.51 K/UL (ref 0.11–0.59)
MONOCYTES NFR BLD: 4.7 %
MONOCYTES NFR BLD: 7.3 %
NEUT ABS #: 5.31 K/UL (ref 1.4–6.5)
NEUT ABS #: 5.73 K/UL (ref 1.4–6.5)
NEUTROPHILS # BLD AUTO: 1.4 %
NEUTROPHILS # BLD AUTO: 3.2 %
NEUTROPHILS NFR BLD AUTO: 76.2 %
NEUTROPHILS NFR BLD AUTO: 82.3 %
PMV BLD AUTO: 9.6 FL (ref 7.4–10.4)
PMV BLD AUTO: 9.7 FL (ref 7.4–10.4)
POTASSIUM SERPL-SCNC: 4.4 MMOL/L (ref 3.5–5.1)
POTASSIUM SERPL-SCNC: 4.9 MMOL/L (ref 3.5–5.1)
RED CELL DISTRIBUTION WIDTH CV: 22.6 % (ref 11.5–14.5)
RED CELL DISTRIBUTION WIDTH CV: 23 % (ref 11.5–14.5)
RED CELL DISTRIBUTION WIDTH SD: 70.6 FL (ref 36.4–46.3)
RED CELL DISTRIBUTION WIDTH SD: 71.2 FL (ref 36.4–46.3)
SODIUM RANDOM URINE: 82 MEQ/L
SODIUM SERPL-SCNC: 135 MMOL/L (ref 136–145)
SODIUM SERPL-SCNC: 136 MMOL/L (ref 136–145)
WBC # BLD AUTO: 6.97 K/UL (ref 4.8–10.8)
WBC # BLD AUTO: 6.97 K/UL (ref 4.8–10.8)

## 2018-03-05 RX ADMIN — OXYBUTYNIN CHLORIDE SCH MG: 5 TABLET ORAL at 19:31

## 2018-03-05 RX ADMIN — DILTIAZEM HYDROCHLORIDE SCH MG: 60 CAPSULE, EXTENDED RELEASE ORAL at 07:46

## 2018-03-05 RX ADMIN — METOPROLOL TARTRATE SCH MG: 25 TABLET, FILM COATED ORAL at 07:47

## 2018-03-05 RX ADMIN — RANITIDINE SCH MG: 150 TABLET ORAL at 19:31

## 2018-03-05 RX ADMIN — RANITIDINE SCH MG: 150 TABLET ORAL at 07:46

## 2018-03-05 RX ADMIN — FERROUS SULFATE TAB EC 325 MG (65 MG FE EQUIVALENT) SCH MG: 325 (65 FE) TABLET DELAYED RESPONSE at 07:46

## 2018-03-05 RX ADMIN — Medication SCH TAB: at 07:47

## 2018-03-05 RX ADMIN — POTASSIUM CHLORIDE SCH MEQ: 1500 TABLET, EXTENDED RELEASE ORAL at 07:47

## 2018-03-05 RX ADMIN — PANTOPRAZOLE SCH MG: 40 TABLET, DELAYED RELEASE ORAL at 07:48

## 2018-03-05 RX ADMIN — FERROUS SULFATE TAB EC 325 MG (65 MG FE EQUIVALENT) SCH MG: 325 (65 FE) TABLET DELAYED RESPONSE at 16:20

## 2018-03-05 RX ADMIN — OXYBUTYNIN CHLORIDE SCH MG: 5 TABLET ORAL at 07:46

## 2018-03-05 RX ADMIN — OXYCODONE HYDROCHLORIDE AND ACETAMINOPHEN PRN TAB: 5; 325 TABLET ORAL at 05:29

## 2018-03-05 RX ADMIN — TAMSULOSIN HYDROCHLORIDE SCH MG: 0.4 CAPSULE ORAL at 07:48

## 2018-03-05 RX ADMIN — POTASSIUM CHLORIDE SCH MEQ: 1500 TABLET, EXTENDED RELEASE ORAL at 19:32

## 2018-03-05 RX ADMIN — SODIUM CHLORIDE SCH MLS/HR: 900 INJECTION, SOLUTION INTRAVENOUS at 12:21

## 2018-03-05 RX ADMIN — OXYBUTYNIN CHLORIDE SCH MG: 5 TABLET ORAL at 14:03

## 2018-03-05 RX ADMIN — OXYCODONE HYDROCHLORIDE AND ACETAMINOPHEN PRN TAB: 5; 325 TABLET ORAL at 23:48

## 2018-03-05 RX ADMIN — SODIUM CHLORIDE SCH MLS/HR: 900 INJECTION, SOLUTION INTRAVENOUS at 21:19

## 2018-03-05 RX ADMIN — OXYCODONE HYDROCHLORIDE AND ACETAMINOPHEN PRN TAB: 5; 325 TABLET ORAL at 16:20

## 2018-03-05 RX ADMIN — FINASTERIDE SCH MG: 5 TABLET, FILM COATED ORAL at 21:20

## 2018-03-05 NOTE — DIAGNOSTIC IMAGING REPORT
KUB



HISTORY: Acute suprapubic pelvic pain with a suprapubic catheter.  check stent

position



COMPARISON: , Retrograde cystourethrogram 2/26/2018 CT abdomen and pelvis

2/25/2018



FINDINGS: The bowel gas pattern is non-obstructive. Moderate stool volume of the

colon. There is no organomegaly.  Left-sided ureteral stent is in place with

proximal portion coiled in the left upper abdomen at the level of L2-L3, likely

within the region of the left renal pelvis. The distal portion of the stent is

within the left hemipelvis in the expected region of the left aspect of the

urinary bladder. Suprapubic catheter is present. No definite renal or ureteral

calculi. No pneumoperitoneum or pneumatosis. No fracture. Right hip

arthroplasty. Degenerative changes are noted about the left hip, pelvis and

lumbar spine. Splenic calcifications noted compatible with prior granulomatous

disease.



IMPRESSION:  



1. Left ureteral stent appears to be in satisfactory positioning. No definite

nephrolithiasis or ureteral calculi identified.

2. Moderate stool volume suggests constipation.

3. Nonobstructive bowel gas pattern.







Electronically signed by:  Darren Darling M.D.

3/5/2018 3:33 PM



Dictated Date/Time:  3/5/2018 3:31 PM

## 2018-03-05 NOTE — NEPHROLOGY PROGRESS NOTE
Nephrology Progress Note


Date of Service


Mar 5, 2018.





Chief Complaint


Follow-up for acute kidney injury.





Subjective


Mr. Santos was seen and examined in his room this afternoon.  Overall he has been 

feeling fine however has been frustrated with prolonged hospital course.  Renal 

function has been slowly worsening have over last few days and creatinine was 

3.1 this morning, electrolyte acceptable.  Blood pressure seems to be well 

controlled.  He has been voiding normally and has been net negative every day 

almost more than a liter.  Blood pressure has been well controlled.





Review of Systems


A complete review of systems was performed.  Pertinent positives are noted 

above. All other systems are negative.





Vital Signs





Last 8 Hrs








  Date Time  Temp Pulse Resp B/P (MAP) Pulse Ox O2 Delivery O2 Flow Rate FiO2


 


3/5/18 08:04 36.6 62 18 162/79 (106) 91 Room Air  


 


3/5/18 08:00      Room Air  











Last Recorded Weight


Weight (Kilograms):  65.700





Physical Exam


GENERAL: Elderly male, AAA x 3,  pleasant, healthy-appearing, not in any 

distress.


NECK: Supple, no JVD.


RESPIRATORY: Normal breathing efforts, no accessory muscle use, clear to 

auscultation bilaterally, no wheezes or rales.


CARDIOVASCULAR: S1, S2 normal, rate rhythm regular.


EXTREMITY: No lower extremity edema


NEURO: speech fluent.


PSYCHIATRY: Normal mood and judgment





Family History





FH: CAD (coronary artery disease)


  BROTHER





Negative for CKD / ESRD





Social History


Smoking Status:  Former smoker


Marital Status:  


Housing Status:  lives with family


Occupation:  retired





.  Retired.  Formerly worked at [employer], later owned a 

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





Laboratory Results


Past 24 Hours


3/5/18 05:44








Red Blood Count 3.09, Mean Corpuscular Volume 85.4, Mean Corpuscular Hemoglobin 

27.8, Mean Corpuscular Hemoglobin Concent 32.6, Mean Platelet Volume 9.7, 

Neutrophils (%) (Auto) 76.2, Lymphocytes (%) (Auto) 12.5, Monocytes (%) (Auto) 

7.3, Eosinophils (%) (Auto) 3.2, Basophils (%) (Auto) 0.1, Neutrophils # (Auto) 

5.31, Lymphocytes # (Auto) 0.87, Monocytes # (Auto) 0.51, Eosinophils # (Auto) 

0.22, Basophils # (Auto) 0.01





3/5/18 13:30








Red Blood Count 3.20, Mean Corpuscular Volume 85.3, Mean Corpuscular Hemoglobin 

27.5, Mean Corpuscular Hemoglobin Concent 32.2, Mean Platelet Volume 9.6, 

Neutrophils (%) (Auto) 82.3, Lymphocytes (%) (Auto) 10.6, Monocytes (%) (Auto) 

4.7, Eosinophils (%) (Auto) 1.4, Basophils (%) (Auto) 0.3, Neutrophils # (Auto) 

5.73, Lymphocytes # (Auto) 0.74, Monocytes # (Auto) 0.33, Eosinophils # (Auto) 

0.10, Basophils # (Auto) 0.02





3/5/18 05:44

















Test


  3/5/18


05:44 3/5/18


13:30


 


White Blood Count


  6.97 K/uL


(4.8-10.8) 6.97 K/uL


(4.8-10.8)


 


Red Blood Count


  3.09 M/uL


(4.7-6.1) 3.20 M/uL


(4.7-6.1)


 


Hemoglobin


  8.6 g/dL


(14.0-18.0) 8.8 g/dL


(14.0-18.0)


 


Hematocrit 26.4 % (42-52)  27.3 % (42-52) 


 


Mean Corpuscular Volume


  85.4 fL


() 85.3 fL


()


 


Mean Corpuscular Hemoglobin


  27.8 pg


(25-34) 27.5 pg


(25-34)


 


Mean Corpuscular Hemoglobin


Concent 32.6 g/dl


(32-36) 32.2 g/dl


(32-36)


 


Platelet Count


  163 K/uL


(130-400) 171 K/uL


(130-400)


 


Mean Platelet Volume


  9.7 fL


(7.4-10.4) 9.6 fL


(7.4-10.4)


 


Neutrophils (%) (Auto) 76.2 %  82.3 % 


 


Lymphocytes (%) (Auto) 12.5 %  10.6 % 


 


Monocytes (%) (Auto) 7.3 %  4.7 % 


 


Eosinophils (%) (Auto) 3.2 %  1.4 % 


 


Basophils (%) (Auto) 0.1 %  0.3 % 


 


Neutrophils # (Auto)


  5.31 K/uL


(1.4-6.5) 5.73 K/uL


(1.4-6.5)


 


Lymphocytes # (Auto)


  0.87 K/uL


(1.2-3.4) 0.74 K/uL


(1.2-3.4)


 


Monocytes # (Auto)


  0.51 K/uL


(0.11-0.59) 0.33 K/uL


(0.11-0.59)


 


Eosinophils # (Auto)


  0.22 K/uL


(0-0.5) 0.10 K/uL


(0-0.5)


 


Basophils # (Auto)


  0.01 K/uL


(0-0.2) 0.02 K/uL


(0-0.2)


 


RDW Standard Deviation


  71.2 fL


(36.4-46.3) 70.6 fL


(36.4-46.3)


 


RDW Coefficient of Variation


  23.0 %


(11.5-14.5) 22.6 %


(11.5-14.5)


 


Immature Granulocyte % (Auto) 0.7 %  0.7 % 


 


Immature Granulocyte # (Auto)


  0.05 K/uL


(0.00-0.02) 0.05 K/uL


(0.00-0.02)


 


Anisocytosis PRESENT  PRESENT 


 


Anion Gap


  9.0 mmol/L


(3-11) 


 


 


Est Creatinine Clear Calc


Drug Dose 17.3 ml/min 


  


 


 


Estimated GFR (


American) 21.2 


  


 


 


Estimated GFR (Non-


American 18.3 


  


 


 


BUN/Creatinine Ratio 11.3 (10-20)  


 


Calcium Level


  8.7 mg/dl


(8.5-10.1) 


 











Allergies


Coded Allergies:  


     Lisinopril (Verified  Adverse Reaction, Mild, COUGH, 12/13/17)





Medications





Current Inpatient Medications








 Medications


  (Trade)  Dose


 Ordered  Sig/Juaquin


 Route  Start Time


 Stop Time Status Last Admin


Dose Admin


 


 Acetaminophen


  (Tylenol Tab)  650 mg  Q4H  PRN


 PO  2/25/18 15:45


 3/27/18 15:44   


 


 


 Al Hydrox/Mg


 Hydrox/Simethicone


  (Maalox Max Susp)  15 ml  Q4H  PRN


 PO  2/25/18 15:45


 3/27/18 15:44   


 


 


 Magnesium


 Hydroxide


  (Milk Of


 Magnesia Susp)  30 ml  Q6H  PRN


 PO  2/25/18 15:45


 3/27/18 15:44   


 


 


 Polyethylene


  (Miralax Powder


 Packet)  17 gm  DAILY  PRN


 PO  2/25/18 15:45


 3/27/18 15:44  3/5/18 05:41


17 GM


 


 Ondansetron HCl


  (Zofran Inj)  4 mg  Q6H  PRN


 IV  2/25/18 15:45


 3/27/18 15:44   


 


 


 Aspirin


  (Ecotrin Tab)  81 mg  BID


 PO  2/25/18 20:00


 3/27/18 20:59 Future Hold 2/28/18 08:09


81 MG


 


 Finasteride


  (Proscar Tab)  5 mg  HS


 PO  2/25/18 21:00


 3/27/18 20:59  3/4/18 20:07


5 MG


 


 Folic Acid


  (Folvite Tab)  1 mg  QAM


 PO  2/26/18 08:00


 3/28/18 08:59  3/5/18 07:48


1 MG


 


 Multivitamins


  (Multivitamin


 Tab)  1 tab  QAM


 PO  2/26/18 08:00


 3/28/18 08:59  3/5/18 07:47


1 TAB


 


 Potassium Chloride


  (Klor-Con Tab)  20 meq  BID


 PO  2/25/18 20:00


 3/27/18 20:59  3/5/18 07:47


20 MEQ


 


 Ranitidine HCl


  (zANTac TAB)  150 mg  BID


 PO  2/25/18 20:00


 3/27/18 20:59  3/5/18 07:46


150 MG


 


 Tamsulosin HCl


  (Flomax Cap)  0.4 mg  DAILY


 PO  2/26/18 08:00


 3/28/18 08:59  3/5/18 07:48


0.4 MG


 


 Ferrous Sulfate


  (Feosol Tab)  325 mg  BIDM


 PO  2/25/18 17:00


 3/27/18 17:59  3/5/18 07:46


325 MG


 


 Pantoprazole


 Sodium


  (Protonix Tab)  40 mg  QAM


 PO  2/26/18 08:00


 3/28/18 08:59  3/5/18 07:48


40 MG


 


 Oxycodone/


 Acetaminophen


  (Percocet


 5-325mg Tab)  1 tab  Q4H  PRN


 PO  2/25/18 15:45


 3/11/18 15:44  3/5/18 05:29


1 TAB


 


 Oxybutynin


 Chloride


  (Ditropan Tab)  5 mg  TID


 PO  2/27/18 14:00


 3/29/18 13:59  3/5/18 14:03


5 MG


 


 Prednisone


  (PredniSONE TAB)  5 mg  DAILY


 PO  2/28/18 08:00


 3/28/18 08:59  3/5/18 07:48


5 MG


 


 Daptomycin 400 mg/


 Syringe  8 ml @ 4


 mls/min  Q48H


 IV  3/2/18 09:00


 3/16/18 08:59  3/4/18 07:42


4 MLS/MIN


 


 Metoprolol


 Tartrate


  (Lopressor Tab)  25 mg  DAILY


 PO  3/4/18 08:00


 3/28/18 08:59  3/5/18 07:47


25 MG


 


 Docusate Sodium


  (coLACE CAP)  100 mg  DAILY  PRN


 PO  3/4/18 16:30


 4/3/18 16:29   


 


 


 Diltiazem HCl


  (Cardizem Sr)  60 mg  DAILY


 PO  3/5/18 08:00


 4/4/18 07:59  3/5/18 07:46


60 MG


 


 Sodium Chloride  1,000 ml @ 


 100 mls/hr  Q10H


 IV  3/5/18 12:00


 4/4/18 11:59  3/5/18 12:21


100 MLS/HR











Impression





(1) Acute renal failure


(2) Hydronephrosis


(3) Kidney stones


(4) Pyelonephritis


(5) BPH (benign prostatic hyperplasia)


(6) Renal cyst


(7) Chronic anemia


77-year-old gentlemen with acute kidney injury with left-sided hydronephrosis 

status post  ureteral stent.  Creatinine initially improved from 4.1-2.6 and had

, had been stable for several days however started to worsen again, creatinine 

3.1 this morning.  Electrolyte acceptable.  Blood pressure well controlled.


Differentials for acute kidney injury include volume depletion with diuresis 

with net negative more than 1 liter daily, acute interstitial nephritis with 

different antibiotics including daptomycin which has been over last few days, 

possible stent dislodgement or dysfunction and recurrent hydronephrosis on the 

left side.





Recommendations





--will check urine electrolytes, urine eosinophilia, CBC with differential and 

repeat renal panel


--agree with continuing on IV fluid for now as patient has been persistently 

net negative over last few days


--as patient has MSSA, consider discontinuing daptomycin and switched to Bactrim


--consider stopping pantoprazole and continue on H2 blocker only


--will review renal ultrasound ordered by Urology


--if there is any suggestion of acute interstitial nephritis and renal function 

continues to worsen, will consider giving high-dose steroid since repeat Vinod 

blood culture was negative.





Will follow.

## 2018-03-05 NOTE — FAMILY MEDICINE PROGRESS NOTE
Progress Note


Date of Service


Mar 5, 2018.





Subjective


Pt evaluation today including:  conversation w/ patient


Spoke with patient briefly this morning and again at length on formal rounds.  

Overall, he says he's doing physically well.  Says his stent and alex are 

uncomfortable, as is sitting on his bottom (unchanged).  Denies any generalized 

abdominal pain, N/V, or other acute physical concerns.  He does ask questions 

about his overall care plan, including when he might be discharged home.





   Constitutional:  No fever, No chills


   Respiratory:  No cough, No shortness of breath


   Cardiovascular:  No chest pain, No edema


   Abdomen:  No pain, No nausea, No vomiting





Medications





Current Inpatient Medications








 Medications


  (Trade)  Dose


 Ordered  Sig/Juaquin


 Route  Start Time


 Stop Time Status Last Admin


Dose Admin


 


 Acetaminophen


  (Tylenol Tab)  650 mg  Q4H  PRN


 PO  2/25/18 15:45


 3/27/18 15:44   


 


 


 Al Hydrox/Mg


 Hydrox/Simethicone


  (Maalox Max Susp)  15 ml  Q4H  PRN


 PO  2/25/18 15:45


 3/27/18 15:44   


 


 


 Magnesium


 Hydroxide


  (Milk Of


 Magnesia Susp)  30 ml  Q6H  PRN


 PO  2/25/18 15:45


 3/27/18 15:44   


 


 


 Polyethylene


  (Miralax Powder


 Packet)  17 gm  DAILY  PRN


 PO  2/25/18 15:45


 3/27/18 15:44  3/5/18 05:41


17 GM


 


 Ondansetron HCl


  (Zofran Inj)  4 mg  Q6H  PRN


 IV  2/25/18 15:45


 3/27/18 15:44   


 


 


 Aspirin


  (Ecotrin Tab)  81 mg  BID


 PO  2/25/18 20:00


 3/27/18 20:59 Future Hold 2/28/18 08:09


81 MG


 


 Finasteride


  (Proscar Tab)  5 mg  HS


 PO  2/25/18 21:00


 3/27/18 20:59  3/4/18 20:07


5 MG


 


 Folic Acid


  (Folvite Tab)  1 mg  QAM


 PO  2/26/18 08:00


 3/28/18 08:59  3/5/18 07:48


1 MG


 


 Multivitamins


  (Multivitamin


 Tab)  1 tab  QAM


 PO  2/26/18 08:00


 3/28/18 08:59  3/5/18 07:47


1 TAB


 


 Potassium Chloride


  (Klor-Con Tab)  20 meq  BID


 PO  2/25/18 20:00


 3/27/18 20:59  3/5/18 07:47


20 MEQ


 


 Ranitidine HCl


  (zANTac TAB)  150 mg  BID


 PO  2/25/18 20:00


 3/27/18 20:59  3/5/18 07:46


150 MG


 


 Tamsulosin HCl


  (Flomax Cap)  0.4 mg  DAILY


 PO  2/26/18 08:00


 3/28/18 08:59  3/5/18 07:48


0.4 MG


 


 Ferrous Sulfate


  (Feosol Tab)  325 mg  BIDM


 PO  2/25/18 17:00


 3/27/18 17:59  3/5/18 07:46


325 MG


 


 Pantoprazole


 Sodium


  (Protonix Tab)  40 mg  QAM


 PO  2/26/18 08:00


 3/28/18 08:59  3/5/18 07:48


40 MG


 


 Oxycodone/


 Acetaminophen


  (Percocet


 5-325mg Tab)  1 tab  Q4H  PRN


 PO  2/25/18 15:45


 3/11/18 15:44  3/5/18 05:29


1 TAB


 


 Oxybutynin


 Chloride


  (Ditropan Tab)  5 mg  TID


 PO  2/27/18 14:00


 3/29/18 13:59  3/5/18 07:46


5 MG


 


 Prednisone


  (PredniSONE TAB)  5 mg  DAILY


 PO  2/28/18 08:00


 3/28/18 08:59  3/5/18 07:48


5 MG


 


 Daptomycin 400 mg/


 Syringe  8 ml @ 4


 mls/min  Q48H


 IV  3/2/18 09:00


 3/16/18 08:59  3/4/18 07:42


4 MLS/MIN


 


 Metoprolol


 Tartrate


  (Lopressor Tab)  25 mg  DAILY


 PO  3/4/18 08:00


 3/28/18 08:59  3/5/18 07:47


25 MG


 


 Docusate Sodium


  (coLACE CAP)  100 mg  DAILY  PRN


 PO  3/4/18 16:30


 4/3/18 16:29   


 


 


 Diltiazem HCl


  (Cardizem Sr)  60 mg  DAILY


 PO  3/5/18 08:00


 4/4/18 07:59  3/5/18 07:46


60 MG











Objective


Vital Signs











  Date Time  Temp Pulse Resp B/P (MAP) Pulse Ox O2 Delivery O2 Flow Rate FiO2


 


3/5/18 08:04 36.6 62 18 162/79 (106) 91 Room Air  


 


3/5/18 00:35 36.8 59 18 149/80 (103) 97 Room Air  


 


3/5/18 00:30      Room Air  


 


3/4/18 16:00      Room Air  


 


3/4/18 14:30 36.6 45 18 132/63 94   


 


3/4/18 14:00 36.9 51 20 122/75 94   


 


3/4/18 13:38 36.9 54 16 130/66    


 


3/4/18 12:59 36.9 56 18 124/70    


 


3/4/18 10:26      Room Air  











Physical Exam


Notes:


General Appearance:  Awake, alert & oriented, sitting in chair, comfortable in 

general, NAD.  


CV: +S1S2 RRR.  No peripheral edema.  


Pulm: Clear to auscultation throughout.  


Abdomen:  +BS, soft, non-distended.  Mild suprapubic/LLQ tenderness.  No 

guarding.  


Extremities:  No pedal edema or calf tenderness.  


Neuro: No gross neuro deficits.  


Lines: Left arm PIV.  Alex cath.





Laboratory Results


3/5/18 05:44








Red Blood Count 3.09, Mean Corpuscular Volume 85.4, Mean Corpuscular Hemoglobin 

27.8, Mean Corpuscular Hemoglobin Concent 32.6, Mean Platelet Volume 9.7, 

Neutrophils (%) (Auto) 76.2, Lymphocytes (%) (Auto) 12.5, Monocytes (%) (Auto) 

7.3, Eosinophils (%) (Auto) 3.2, Basophils (%) (Auto) 0.1, Neutrophils # (Auto) 

5.31, Lymphocytes # (Auto) 0.87, Monocytes # (Auto) 0.51, Eosinophils # (Auto) 

0.22, Basophils # (Auto) 0.01





3/5/18 05:44

















Test


  3/5/18


05:44


 


White Blood Count


  6.97 K/uL


(4.8-10.8)


 


Red Blood Count


  3.09 M/uL


(4.7-6.1)


 


Hemoglobin


  8.6 g/dL


(14.0-18.0)


 


Hematocrit 26.4 % (42-52) 


 


Mean Corpuscular Volume


  85.4 fL


()


 


Mean Corpuscular Hemoglobin


  27.8 pg


(25-34)


 


Mean Corpuscular Hemoglobin


Concent 32.6 g/dl


(32-36)


 


Platelet Count


  163 K/uL


(130-400)


 


Mean Platelet Volume


  9.7 fL


(7.4-10.4)


 


Neutrophils (%) (Auto) 76.2 % 


 


Lymphocytes (%) (Auto) 12.5 % 


 


Monocytes (%) (Auto) 7.3 % 


 


Eosinophils (%) (Auto) 3.2 % 


 


Basophils (%) (Auto) 0.1 % 


 


Neutrophils # (Auto)


  5.31 K/uL


(1.4-6.5)


 


Lymphocytes # (Auto)


  0.87 K/uL


(1.2-3.4)


 


Monocytes # (Auto)


  0.51 K/uL


(0.11-0.59)


 


Eosinophils # (Auto)


  0.22 K/uL


(0-0.5)


 


Basophils # (Auto)


  0.01 K/uL


(0-0.2)


 


RDW Standard Deviation


  71.2 fL


(36.4-46.3)


 


RDW Coefficient of Variation


  23.0 %


(11.5-14.5)


 


Immature Granulocyte % (Auto) 0.7 % 


 


Immature Granulocyte # (Auto)


  0.05 K/uL


(0.00-0.02)


 


Anisocytosis PRESENT 


 


Anion Gap


  9.0 mmol/L


(3-11)


 


Est Creatinine Clear Calc


Drug Dose 17.3 ml/min 


 


 


Estimated GFR (


American) 21.2 


 


 


Estimated GFR (Non-


American 18.3 


 


 


BUN/Creatinine Ratio 11.3 (10-20) 


 


Calcium Level


  8.7 mg/dl


(8.5-10.1)











Assessment and Plan


78 yo male admitted on 25 feb 2018 for bacteremia secondary to pyelonephritis, 

left VUJ obstruction, and left hydroureteronephrosis.  


PMH: RA on prednisone, BPH, anemia of chronic disease, HTN, HLD, primary OA.  

Recent admit to [facility] from 02-14Feb2018 for LINDA & UTI in setting of urinary 

retention from BPH.  





Bacteremia secondary to to pyelonephritis, left VUJ obstruction, and left 

hydroureteronephrosis:  01Mar BCx positive for staph.  Follow on 03Mar BCx no 

growth thus far and 04Mar BCx pending.  25Feb UCx positive for staph, follow on 

03Mar UCx no growth.  ID onboard.  


-- Echo negative for endocarditis.  


-- On daptomycin day 8.  Will d/c on two weeks of IV daptomycin 400mg q48h


-- Plan for peripheral ultrasound-guided IV for outpatient antibiotics.  


-- CT showed moderate left hydroureteronephrosis with dilatation of the left 

ureter to the level the distal ureter. No ureteral calculi. Moderate left 

perinephric infiltration. 


-- Urology onboard.  Placed left ureteral stent.  Rec'd keep alex in for 

likely outpatient TURP.  


-- Recommended three weeks of bactrim at outpatient for suspected prostatitis.  


-- Percocet for pain management.  





Acute kidney injury:  Up to 3.1 today.  


-- Checking electrolytes.  Started IVF.  


-- Holding amlodipine due to concerns for AIN.  


-- Previous recommendation for contrast and abdominal CT/MRI for left kidney 

complex cyst in outpatient setting


[facility] nephrology recommendations: 


--will check urine electrolytes, urine eosinophilia, CBC with differential and 

repeat renal panel


--agree with continuing on IV fluid for now as patient has been persistently 

net negative over last few days


--as patient has MSSA, consider discontinuing daptomycin and switched to Bactrim


--consider stopping pantoprazole and continue on H2 blocker only


--if there is any suggestion of acute interstitial nephritis and renal function 

continues to worsen, will consider giving high-dose steroid since repeat Vinod 

blood culture was negative.


[facility] urology recommendations: 


-- We will order a KUB to check stent position and a renal ultrasound to see if 

the hydronephrosis has resolved.





Anemia: Likely of chronic disease related to RA, worsened by hematuria.  Today 

improved to 8.6 s/p transfusion.  


-- S/p transfusion on 28Feb and 04Mar (two units PRBCs total).  


-- FOBT negative.  


-- On iron supplementation.  





BPH: chronic x 15 years, worsening


-- Continue flomax and finasteride


-- Patient started on indwelling catheter at [facility]





RA: 


-- Continue to hold leflunomide 2/2 recent weight loss


-- continue 5mg of prednisone (baseline)


-- continue folic acid





HTN: 


-- pt's blood pressure has been on the higher side - as high as 180 systolic


-- continue Metoprolol 25mg daily and daily diltiazem as systolic pressure 

elevated, but diastolic normal


-- decrease diltiazem ER from 120mg to 60mg as blood pressure decreased to 

normal levels, but HR dropped to 45.  monitoring.  


-- hold ASA 81 mg given low Hgb & hematuria 





Code status: Full code.  


DVT prophy: SCD's.  


PT/OT: 27Feb PT initial eval, "He appears to be a good candidate for discharge 

to home from a mobility standpoint."  


Disbo: Admitted on med/surg.  Had lengthy discussion with patient on 05Mar 

about his renal and Hb status.  Ultimately decided to stay for repeat AM labs 

and further discussions with nephrology and urology for treatment plan.  





Resident Physician Supervision Note:





I interviewed and examined the patient. Discussed with Dr. Prieto and agree 

with findings and plan as documented in the note. Any exceptions or 

clarifications are listed here: None





Patient was seen in the company of the resident he is very agitated and 

disturbed he was concerned to the fact that he keeps seeing different doctors 

and no one has the same plan "I loved on the computer and read the plan from 

Dr. Cm the urologist word for word and instructed him how we are 

following this plan but this did not seem to satisfy the patient he says he is 

very frustrated and feels like he is going to explode.  I personally phoned Dr. Cm and requested that he call the patient he should be he would and he 

would also see the patient personally on 6 March given a very long interaction 

and phone call with Dr. Cm I spent an extended visit of 35 additional 

minutes to the typical bedside visit on this patient today March 5


His vital signs are stable and 36 6 pulse 60 respiration 18 BP slightly 

elevated 162/69


Cardiac exam is regular lungs are clear abdomen normal active bowel sounds 

Alex draining clear yellow yellow liquid





Patient is admitted with obstructive uropathy and concern for sepsis from 

urinary source with stenting of his left ureter by urology


Continue renal dosed daptomycin, will hydrate overnight to try to improve his 

renal function and acute kidney injury watching for signs of adrenal 

insufficiency given his chronic steroid use for rheumatoid arthritis





Documented By:  Anthony Raza





Resident Tracking


Resident Involvement:  Resident Care Provided


Care Provided:  Adult Hospital Medicine (inpatient)

## 2018-03-05 NOTE — DIAGNOSTIC IMAGING REPORT
(RENAL)RETROPERITON COMP



HISTORY: Nephrocalcinosis R/O hydronephrosis



COMPARISON:  None.



FINDINGS:



Right kidney: Maximum dimension 11.7 cm. 3.5 cm mid pole cyst. No evidence for

hydronephrosis. Normal corticomedullary differentiation and cortical thickness.



Left kidney:  Left ureteral stent identified distally within the bladder. ( Left

ureteral stent present. Moderate hydronephrosis.



IMPRESSION:



Mild/moderate left renal hydronephrosis. No definite nephrocalcinosis. Proximal

aspect of the renal stent is within the lower renal pelvis







Electronically signed by:  Miguelangel Bowling M.D.

3/5/2018 4:00 PM



Dictated Date/Time:  3/5/2018 3:57 PM

Normal corticomedullary differentiation and cortical thickness.



Bladder: No bladder wall thickening. The bilateral ureteral jets were

identified.



IMPRESSION:  

Normal renal ultrasound. 









The above report was generated using voice recognition software.  It may contain

grammatical, syntax or spelling errors.

## 2018-03-05 NOTE — PROGRESS NOTE
Progress Note


Date of Service


Mar 5, 2018.





Progress Note


Patient with an indwelling Lutz and recently has left stent placement.  His 

creatinine has bumped up slightly today we were asked to reconsult on the 

patient.  His Lutz is patent he is making a large amount of clear yellow 

urine.  He denies any left-sided flank pain.  No fevers or chills.  Creatinine 

bumped up to 3.1


I reviewed his CT prior to the stent and the retrograde.


We will order a KUB to check stent position and a renal ultrasound to see if 

the hydronephrosis has resolved.

## 2018-03-06 VITALS
TEMPERATURE: 98.42 F | SYSTOLIC BLOOD PRESSURE: 169 MMHG | OXYGEN SATURATION: 95 % | DIASTOLIC BLOOD PRESSURE: 85 MMHG | HEART RATE: 71 BPM

## 2018-03-06 LAB
BASOPHILS # BLD: 0.03 K/UL (ref 0–0.2)
BASOPHILS NFR BLD: 0.5 %
BUN SERPL-MCNC: 33 MG/DL (ref 7–18)
CALCIUM SERPL-MCNC: 8.6 MG/DL (ref 8.5–10.1)
CO2 SERPL-SCNC: 22 MMOL/L (ref 21–32)
CREAT SERPL-MCNC: 3.4 MG/DL (ref 0.6–1.4)
EOS ABS #: 0.23 K/UL (ref 0–0.5)
EOSINOPHIL NFR BLD AUTO: 181 K/UL (ref 130–400)
GLUCOSE SERPL-MCNC: 83 MG/DL (ref 70–99)
HCT VFR BLD CALC: 28.1 % (ref 42–52)
HGB BLD-MCNC: 8.9 G/DL (ref 14–18)
IG#: 0.06 K/UL (ref 0–0.02)
IMM GRANULOCYTES NFR BLD AUTO: 13.2 %
LYMPHOCYTES # BLD: 0.84 K/UL (ref 1.2–3.4)
MCH RBC QN AUTO: 27.1 PG (ref 25–34)
MCHC RBC AUTO-ENTMCNC: 31.7 G/DL (ref 32–36)
MCV RBC AUTO: 85.4 FL (ref 80–100)
MONO ABS #: 0.45 K/UL (ref 0.11–0.59)
MONOCYTES NFR BLD: 7.1 %
NEUT ABS #: 4.77 K/UL (ref 1.4–6.5)
NEUTROPHILS # BLD AUTO: 3.6 %
NEUTROPHILS NFR BLD AUTO: 74.7 %
PMV BLD AUTO: 9.3 FL (ref 7.4–10.4)
POTASSIUM SERPL-SCNC: 4.6 MMOL/L (ref 3.5–5.1)
RED CELL DISTRIBUTION WIDTH CV: 22.8 % (ref 11.5–14.5)
RED CELL DISTRIBUTION WIDTH SD: 70.8 FL (ref 36.4–46.3)
SODIUM SERPL-SCNC: 137 MMOL/L (ref 136–145)
WBC # BLD AUTO: 6.38 K/UL (ref 4.8–10.8)

## 2018-03-06 RX ADMIN — DAPTOMYCIN SCH MLS/MIN: 50 INJECTION, POWDER, LYOPHILIZED, FOR SOLUTION INTRAVENOUS at 07:52

## 2018-03-06 RX ADMIN — OXYBUTYNIN CHLORIDE SCH MG: 5 TABLET ORAL at 13:12

## 2018-03-06 RX ADMIN — RANITIDINE SCH MG: 150 TABLET ORAL at 07:46

## 2018-03-06 RX ADMIN — FERROUS SULFATE TAB EC 325 MG (65 MG FE EQUIVALENT) SCH MG: 325 (65 FE) TABLET DELAYED RESPONSE at 17:08

## 2018-03-06 RX ADMIN — RANITIDINE SCH MG: 150 TABLET ORAL at 19:43

## 2018-03-06 RX ADMIN — Medication SCH TAB: at 07:46

## 2018-03-06 RX ADMIN — SODIUM CHLORIDE SCH MLS/HR: 900 INJECTION, SOLUTION INTRAVENOUS at 07:44

## 2018-03-06 RX ADMIN — OXYBUTYNIN CHLORIDE SCH MG: 5 TABLET ORAL at 07:45

## 2018-03-06 RX ADMIN — DILTIAZEM HYDROCHLORIDE SCH MG: 60 CAPSULE, EXTENDED RELEASE ORAL at 07:45

## 2018-03-06 RX ADMIN — OXYCODONE HYDROCHLORIDE AND ACETAMINOPHEN PRN TAB: 5; 325 TABLET ORAL at 07:51

## 2018-03-06 RX ADMIN — METOPROLOL TARTRATE SCH MG: 25 TABLET, FILM COATED ORAL at 07:45

## 2018-03-06 RX ADMIN — FINASTERIDE SCH MG: 5 TABLET, FILM COATED ORAL at 19:43

## 2018-03-06 RX ADMIN — CEFAZOLIN SCH MLS/HR: 10 INJECTION, POWDER, FOR SOLUTION INTRAVENOUS at 17:10

## 2018-03-06 RX ADMIN — OXYBUTYNIN CHLORIDE SCH MG: 5 TABLET ORAL at 19:43

## 2018-03-06 RX ADMIN — POTASSIUM CHLORIDE SCH MEQ: 1500 TABLET, EXTENDED RELEASE ORAL at 19:44

## 2018-03-06 RX ADMIN — PANTOPRAZOLE SCH MG: 40 TABLET, DELAYED RELEASE ORAL at 07:46

## 2018-03-06 RX ADMIN — FERROUS SULFATE TAB EC 325 MG (65 MG FE EQUIVALENT) SCH MG: 325 (65 FE) TABLET DELAYED RESPONSE at 07:45

## 2018-03-06 RX ADMIN — TAMSULOSIN HYDROCHLORIDE SCH MG: 0.4 CAPSULE ORAL at 07:45

## 2018-03-06 RX ADMIN — POTASSIUM CHLORIDE SCH MEQ: 1500 TABLET, EXTENDED RELEASE ORAL at 07:45

## 2018-03-06 NOTE — NEPHROLOGY CONSULTATION
Nephrology Consultation


Date of Consultation:


Mar 6, 2018.


Attending Physician:  Dr Raza


Requesting Physician:  Dr Raza


Reason for Consultation:  second opinion on interstitial nephritis


History of Present Illness


77 year old male who followed recently w/ my partner Dr Garza in CKD clinic 

in [place].  PMH includes rheumatoid arthritis, "massive" (per urology notes) 

prostatic hypertrophy, multiple complex renal cysts and chronic L sided 

hydronephrosis w/o obstructing stone or lesion (as of 8/2017 and 1/2018).





He as admitted to [facility] from 1/25-1/29 and again from 2/2-2/14.  

He had had a baseline creatinine of 0.9 - 1.2 as recently as 11/2017.  He 

presented in late January w/ creatinine 4.1 and volume depletion and was 

treated for this in [facility] from 1/25-1/29/18;  CT of his abdomen/

pelvis during that admission showed chronic L hydronephrosis which urology felt 

did not need urgent intervention; he had a alex catheter during January 

admission and PVR of 170 mL; he went home w/o a alex.  His creatinine improved 

to 2.6 by 1/29 discharge.  





On January 31, repeat labs showed increasing creatinine at 3.3, for which he 

was readmitted to [facility] from 2/2-2/14.   By admission his 

creatinine was 4.1.  The alex was replaced and he had slow improvement w/ this 

and IV fluids until 2/5-2/9 when his creatinine plateau'd at about 3.  Over 

time his serologic work up came back >> complement was negative, as was spep/bao

/ hep panel/ ANCA; cryoglobulins were very mildly but nonspecifically positive 

with negative cryocrit.  Urine eosinophils came back 2 of 3 specimens positive 

however.  He was not a renal biopsy candidate d/t L hydronephrosis on CT scan 

and sever R kidney lesions, including a hyperdense lower pole lesion, all of 

which were stable on imaging.  On 2/9, he began 60 mg daily prednisone w/ plan 

to taper over 2-3 mos to 5 mg dose.  Possible culprits for interstitial 

nephritis include leflunomide versus antibiotics given at January admission 

versus proton pump inhibitor.  At no time to my knowledge did he have F or 

rash.  He did however improve on prednisone; by 2/19 d/c, creatinine was 2.0.  

His PPI was changed to H2 blocker at d/c.  





Urology did a nuclear lasix scan suggestive of partial L upper tract 

obstruction during the February admission >> a L ureteral stent was planned for 

late that admission but then when creatinine improved, this was deferred.  Plan 

was made to f/u as outpatient.  Urology did note at inpatient eval that 

prostatectomy would likely need to be open procedure and recommended eval for 

this at tertiary care center after d/c.  The pt appears to have decided to 

transfer care to [facility] and records were released on 2/20 to this end.





The overall renal diagnosis for this pt as of 2/19/18 CKD clinic follow up was 

combined prerenal and postobstructive processes, but also acute interstitial 

nephritis which was initially responsive to steroids.  At this CKD clinic visit

, Dr. Garza lowered prednisone to 40 mg daily d/t pt restlessness/poor sleep.

  The last outpatient creatinine for this pt in this system is 2.0 on 2/22. 





He follows w/ Dr. Orlando for RA > was to see him w/in 2-4 wks of recent [facility] d

/c:  he has 4/9/18 appt.  had failed methotrexate in the past; leflunomide had 

been stopped d/t renal function.  Had been on lower dose prednisone (5 mg daily

) for RA as recently as 11/2017.





The patient was admitted here on 2/25 w/ pyelonephritis after presenting with L 

flank pain and pus in his catheter and found to have MSSA bacteremia.  He 

specifically requested care from [facility] on presentation and declined to follow w/ 

Nino.  His creatinine was 2.1 on presentation and trended up to 3.1 on 2/26

, then dropped as low as 2.3 3/1-3/3.  Nephrology had signed off but then 

creatinine trended up again to 3.4 today.  They have been seeing pt for the 

past 2 days as well; there is concern per their notes for interstitial 

nephritis and mulitple med changes suggested in today's note. 





His prednisone was rapidly lowered to 5 mg daily on admission > he has been on 

this dose since 2/28.  Inf s recommended treatment with 4 wks of ancef after 

initial treatment w/ daptomycin.  The patient remains on daptomycin currently 

though.  His 3/4/18 blood cultures are so far negative.  He had a L ureteral 

stent placed on 2/26.  Urology continues to plan a prostatectomy once his renal 

function is stabilized but believes that the obstructive concerns are for now 

relieved.





Past Medical/Surgical History


Medical Problems:


(1) Acute renal failure


Status: Acute  





(2) Leukocytosis


Status: Acute  





(3) Urinary tract infection


Status: Acute  








-renal insufficiency as above > combined prerenal, postobstructive, 

interstitial nephritis


-HTN


-prostatic hypertrophy


-chronic L hydronephrosis


-L complex renal cysts


-rheumatoid arthritis


-anemia of chronic disease, follows w/ hematology [facility]


-severe R hip OA, s/p R hip replacement 9/2017





Family History





FH: CAD (coronary artery disease)


  BROTHER





Social History


Smoking Status:  Former Smoker


Alcohol Use:  none


Marital Status:  


Housing Status:  lives with family


Occupation Status:  retired





Allergies


Coded Allergies:  


     Lisinopril (Verified  Adverse Reaction, Mild, COUGH, 12/13/17)





Medications





Current Inpatient Medications








 Medications


  (Trade)  Dose


 Ordered  Sig/Juaquin


 Route  Start Time


 Stop Time Status Last Admin


Dose Admin


 


 Acetaminophen


  (Tylenol Tab)  650 mg  Q4H  PRN


 PO  2/25/18 15:45


 3/27/18 15:44   


 


 


 Al Hydrox/Mg


 Hydrox/Simethicone


  (Maalox Max Susp)  15 ml  Q4H  PRN


 PO  2/25/18 15:45


 3/27/18 15:44   


 


 


 Magnesium


 Hydroxide


  (Milk Of


 Magnesia Susp)  30 ml  Q6H  PRN


 PO  2/25/18 15:45


 3/27/18 15:44  3/5/18 16:23


30 ML


 


 Polyethylene


  (Miralax Powder


 Packet)  17 gm  DAILY  PRN


 PO  2/25/18 15:45


 3/27/18 15:44  3/5/18 05:41


17 GM


 


 Ondansetron HCl


  (Zofran Inj)  4 mg  Q6H  PRN


 IV  2/25/18 15:45


 3/27/18 15:44   


 


 


 Aspirin


  (Ecotrin Tab)  81 mg  BID


 PO  2/25/18 20:00


 3/27/18 20:59 Future Hold 2/28/18 08:09


81 MG


 


 Finasteride


  (Proscar Tab)  5 mg  HS


 PO  2/25/18 21:00


 3/27/18 20:59  3/5/18 21:20


5 MG


 


 Folic Acid


  (Folvite Tab)  1 mg  QAM


 PO  2/26/18 08:00


 3/28/18 08:59  3/6/18 07:45


1 MG


 


 Multivitamins


  (Multivitamin


 Tab)  1 tab  QAM


 PO  2/26/18 08:00


 3/28/18 08:59  3/6/18 07:46


1 TAB


 


 Potassium Chloride


  (Klor-Con Tab)  20 meq  BID


 PO  2/25/18 20:00


 3/27/18 20:59  3/6/18 07:45


20 MEQ


 


 Ranitidine HCl


  (zANTac TAB)  150 mg  BID


 PO  2/25/18 20:00


 3/27/18 20:59  3/6/18 07:46


150 MG


 


 Tamsulosin HCl


  (Flomax Cap)  0.4 mg  DAILY


 PO  2/26/18 08:00


 3/28/18 08:59  3/6/18 07:45


0.4 MG


 


 Ferrous Sulfate


  (Feosol Tab)  325 mg  BIDM


 PO  2/25/18 17:00


 3/27/18 17:59  3/6/18 07:45


325 MG


 


 Oxycodone/


 Acetaminophen


  (Percocet


 5-325mg Tab)  1 tab  Q4H  PRN


 PO  2/25/18 15:45


 3/11/18 15:44  3/6/18 07:51


1 TAB


 


 Oxybutynin


 Chloride


  (Ditropan Tab)  5 mg  TID


 PO  2/27/18 14:00


 3/29/18 13:59  3/6/18 13:12


5 MG


 


 Daptomycin 400 mg/


 Syringe  8 ml @ 4


 mls/min  Q48H


 IV  3/2/18 09:00


 3/16/18 08:59  3/6/18 07:52


4 MLS/MIN


 


 Metoprolol


 Tartrate


  (Lopressor Tab)  25 mg  DAILY


 PO  3/4/18 08:00


 3/28/18 08:59  3/6/18 07:45


25 MG


 


 Docusate Sodium


  (coLACE CAP)  100 mg  DAILY  PRN


 PO  3/4/18 16:30


 4/3/18 16:29   


 


 


 Diltiazem HCl


  (Cardizem Sr)  60 mg  DAILY


 PO  3/5/18 08:00


 4/4/18 07:59  3/6/18 07:45


60 MG











Home Meds and Scripts








 Medications  Dose


 Route/Sig


 Max Daily Dose Days Date Category


 


 Daptomycin 500 Mg


 Inj  400 Mg


 IV Q48H


    3/5/18 Rx


 


 Tylenol


  (Acetaminophen)


 500 Mg Tab  1,000 Mg


 PO UD PRN


    2/25/18 Reported


 


 Flomax


  (Tamsulosin Hcl)


 0.4 Mg Cap  0.4 Mg


 PO DAILY


    2/25/18 Reported


 


 Zantac


  (Ranitidine HCl)


 150 Mg Tab  150 Mg


 PO BID


    2/25/18 Reported


 


 Ascorbic Acid 500


 Mg Tab  Unknown Dose


 PO BID


    2/25/18 Reported


 


 Micro-K Ext Rel


  (Potassium


 Chloride) 10 Meq


 Capcr  20 Meq


 PO BID


    2/25/18 Reported


 


 Prednisone 20 Mg


 Tab  40 Mg


 PO DAILY


    2/25/18 Reported


 


 Lopressor


  (Metoprolol


 Tartrate) 25 Mg


 Tab  25 Mg


 PO DAILY


    2/25/18 Reported


 


 Aspirin EC Low


 Dose (Aspirin) 81


 Mg Ectab  81 Mg


 PO BID


   30 12/15/17 Rx


 


 Colace (Docusate


 Sodium) 100 Mg Cap  1 Cap


 PO HS


   15 12/13/17 Reported


 


 Kp Ferrous


 Sulfate (Ferrous


 Sulfate) 325 Mg


 Tab  1 Tab


 PO BID


   30 8/28/17 Reported


 


 Folic Acid 1 Mg


 Tab  1 Tab


 PO QAM


    8/28/17 Reported


 


 Prilosec


  (Omeprazole) 20


 Mg Capcr  20 Mg


 PO QAM


    8/28/17 Reported


 


 Proscar


  (Finasteride) 5


 Mg Tab  5 Mg


 PO HS


    7/7/17 Reported


 


 Multivitamin


  (Multivitamins)


 Tab  1 Tablet


 PO QAM


    6/22/12 Reported











Review of Systems


Constitutional:  + weakness, + fatigue, + problem reported (since last eval 

appetite improved), No fever, No chills


Eyes:  No worsening of vision


ENT:  No hearing loss


Respiratory:  No cough, No shortness of breath, No dyspnea at rest


Cardiac:  No chest pain, No edema, No palpitations


Abdomen:  No pain, No nausea, No vomiting, No diarrhea, No constipation


Musculoskeletal:  No joint pain, No muscle pain


Male :  No dysuria, No incontinence


Neuro:  No memory loss, No weakness


Psych:  + insomnia, No depression symptoms, No anxiety


Heme:  No abnormal bleeding/bruising


Endo:  + fatigue


Skin:  No rash





Physical Exam











  Date Time  Temp Pulse Resp B/P (MAP) Pulse Ox O2 Delivery O2 Flow Rate FiO2


 


3/6/18 08:00      Room Air  


 


3/6/18 07:27 36.9 71 18 169/85 (113) 95 Room Air  


 


3/6/18 00:00      Room Air  


 


3/5/18 23:25 36.7 66 18 179/83 (115) 97 Room Air  


 


3/5/18 19:30      Room Air  


 


3/5/18 16:29 36.4 51 20 164/72 (102) 99 Room Air  


 


3/5/18 16:00 36.4 51 18 164/72 (102) 99   


 


3/5/18 15:45      Room Air  








General Appearance:  WD/WN, no apparent distress, + pertinent finding (

ambulatory on RA)


Eyes:  EOMI


ENT:  hearing grossly normal, + pertinent finding (slight cushingoid features)


Neck:  supple


Respiratory/Chest:  lungs clear, normal breath sounds


Cardiovascular:  regular rate, rhythm, no edema, + systolic murmur


Abdomen:  normal bowel sounds, non tender, soft, + pertinent finding (alex w/ 

ample urine)


Extremities:  normal range of motion, non-tender, no pedal edema


Neurologic/Psych:  alert, normal mood/affect, oriented x 3 (but has trouble w/ 

recall and processing information, consistent with previous interviews)


Skin:  no jaundice, warm/dry, no rash





Diagnostics





Last 24 Hours








Test


  3/5/18


14:50 3/6/18


05:26


 


Urine Random Creatinine 37.0 mg/dl  


 


Urine Random Sodium 82 mEq/L  


 


White Blood Count  6.38 K/uL 


 


Red Blood Count  3.29 M/uL 


 


Hemoglobin  8.9 g/dL 


 


Hematocrit  28.1 % 


 


Mean Corpuscular Volume  85.4 fL 


 


Mean Corpuscular Hemoglobin  27.1 pg 


 


Mean Corpuscular Hemoglobin


Concent 


  31.7 g/dl 


 


 


Platelet Count  181 K/uL 


 


Mean Platelet Volume  9.3 fL 


 


Neutrophils (%) (Auto)  74.7 % 


 


Lymphocytes (%) (Auto)  13.2 % 


 


Monocytes (%) (Auto)  7.1 % 


 


Eosinophils (%) (Auto)  3.6 % 


 


Basophils (%) (Auto)  0.5 % 


 


Neutrophils # (Auto)  4.77 K/uL 


 


Lymphocytes # (Auto)  0.84 K/uL 


 


Monocytes # (Auto)  0.45 K/uL 


 


Eosinophils # (Auto)  0.23 K/uL 


 


Basophils # (Auto)  0.03 K/uL 


 


RDW Standard Deviation  70.8 fL 


 


RDW Coefficient of Variation  22.8 % 


 


Immature Granulocyte % (Auto)  0.9 % 


 


Immature Granulocyte # (Auto)  0.06 K/uL 


 


Anisocytosis  PRESENT 


 


Sodium Level  137 mmol/L 


 


Potassium Level  4.6 mmol/L 


 


Chloride Level  106 mmol/L 


 


Carbon Dioxide Level  22 mmol/L 


 


Anion Gap  9.0 mmol/L 


 


Blood Urea Nitrogen  33 mg/dl 


 


Creatinine  3.40 mg/dl 


 


Est Creatinine Clear Calc


Drug Dose 


  15.8 ml/min 


 


 


Estimated GFR (


American) 


  19.1 


 


 


Estimated GFR (Non-


American 


  16.5 


 


 


BUN/Creatinine Ratio  9.7 


 


Random Glucose  83 mg/dl 


 


Calcium Level  8.6 mg/dl 








Diagnostic Radiology:


CT admission > reviewed





Renal u/s 3/5 >> m- moderate L hydronephrosis, stent present





KUB 3/5 >> constipation





TTE this admission no vegetation; some elevated R heart pressure





Assessment & Plan


78 y/o M w/ recently diagnosed interstitial nephritis, RA, chronic L 

hydronephrosis, marked prostatism, multiple BL renal lesions including complex 

R lower pole renal cyst admitted 2/25 w/ MSSA bacteremia from L pyelonephritis s

/p 2/26 stent with initial improvement in renal function but past 48 hrs 

worsening





Relapsing acute renal failure, challenging and protracted clinical situation





After 2 prolonged [facility] admissions, the impression was that he had multifactorial 

renal failure from prerenal and obstructive processes as well as acute 

interstitial nephritis, for which high dose prednisone was given with good 

response as detailed above.  Dx was empiric as well as based on 2 urine 

specimens positive for eosinophils, again as detailed above





-he had been responding to high dose steroids intensified from RA therapy to 

treat interstitial nephritis at previous hospital stay; these were abruptly 

tapered here after admission w/ potentially life threatening infection >> defer 

to primary service, to inf dzs, to nephrology whether to resume these; would 

have to be balanced w/ infection risk <> he is 48 hrs out from last blood 

culture which remains negative


-agree w/ Dr. Goodman's recommendations regarding  IV fluids as tolerated


>>>would definitely change from PPI > H2 blocker


>>>would prefer cefazolin to bactrim however (see today's renal note from [facility]) 

unless I have missed a reason for avoiding the former when/if changing from 

daptomycin 


-given his renal anatomy/lesions do not believe he is a renal biopsy candidate





I spoke at length as best I could with the patient about above; he continues to 

struggle to understand his complicated clinical situation and is understandably 

frustrated though he remains overall satisfied with his care and states that he 

wishes to continue to follow w/ the [facility] renal group





Appreciate opportunity to offer second opinion; will sign off.

## 2018-03-06 NOTE — FAMILY MEDICINE PROGRESS NOTE
Progress Note


Date of Service


Mar 6, 2018.





Subjective


Pt evaluation today including:  conversation w/ patient


Found patient sitting in bedside chair.  He continues to note some frustration 

with why some of his lab values have been worsening.  Otherwise he says the 

alex catheter is uncomfortable but the "stent pain" is manageable.  No other 

acute medical concerns.





   Constitutional:  No fever, No chills


   Respiratory:  No cough, No shortness of breath


   Cardiovascular:  No chest pain, No edema


   Abdomen:  + pain ("stent pain"), No nausea, No vomiting, No diarrhea





Medications





Current Inpatient Medications








 Medications


  (Trade)  Dose


 Ordered  Sig/Juaquin


 Route  Start Time


 Stop Time Status Last Admin


Dose Admin


 


 Acetaminophen


  (Tylenol Tab)  650 mg  Q4H  PRN


 PO  2/25/18 15:45


 3/27/18 15:44   


 


 


 Al Hydrox/Mg


 Hydrox/Simethicone


  (Maalox Max Susp)  15 ml  Q4H  PRN


 PO  2/25/18 15:45


 3/27/18 15:44   


 


 


 Magnesium


 Hydroxide


  (Milk Of


 Magnesia Susp)  30 ml  Q6H  PRN


 PO  2/25/18 15:45


 3/27/18 15:44  3/5/18 16:23


30 ML


 


 Polyethylene


  (Miralax Powder


 Packet)  17 gm  DAILY  PRN


 PO  2/25/18 15:45


 3/27/18 15:44  3/5/18 05:41


17 GM


 


 Ondansetron HCl


  (Zofran Inj)  4 mg  Q6H  PRN


 IV  2/25/18 15:45


 3/27/18 15:44   


 


 


 Aspirin


  (Ecotrin Tab)  81 mg  BID


 PO  2/25/18 20:00


 3/27/18 20:59 Future Hold 2/28/18 08:09


81 MG


 


 Finasteride


  (Proscar Tab)  5 mg  HS


 PO  2/25/18 21:00


 3/27/18 20:59  3/5/18 21:20


5 MG


 


 Folic Acid


  (Folvite Tab)  1 mg  QAM


 PO  2/26/18 08:00


 3/28/18 08:59  3/6/18 07:45


1 MG


 


 Multivitamins


  (Multivitamin


 Tab)  1 tab  QAM


 PO  2/26/18 08:00


 3/28/18 08:59  3/6/18 07:46


1 TAB


 


 Potassium Chloride


  (Klor-Con Tab)  20 meq  BID


 PO  2/25/18 20:00


 3/27/18 20:59  3/6/18 07:45


20 MEQ


 


 Ranitidine HCl


  (zANTac TAB)  150 mg  BID


 PO  2/25/18 20:00


 3/27/18 20:59  3/6/18 07:46


150 MG


 


 Tamsulosin HCl


  (Flomax Cap)  0.4 mg  DAILY


 PO  2/26/18 08:00


 3/28/18 08:59  3/6/18 07:45


0.4 MG


 


 Ferrous Sulfate


  (Feosol Tab)  325 mg  BIDM


 PO  2/25/18 17:00


 3/27/18 17:59  3/6/18 07:45


325 MG


 


 Pantoprazole


 Sodium


  (Protonix Tab)  40 mg  QAM


 PO  2/26/18 08:00


 3/28/18 08:59  3/6/18 07:46


40 MG


 


 Oxycodone/


 Acetaminophen


  (Percocet


 5-325mg Tab)  1 tab  Q4H  PRN


 PO  2/25/18 15:45


 3/11/18 15:44  3/6/18 07:51


1 TAB


 


 Oxybutynin


 Chloride


  (Ditropan Tab)  5 mg  TID


 PO  2/27/18 14:00


 3/29/18 13:59  3/6/18 07:45


5 MG


 


 Prednisone


  (PredniSONE TAB)  5 mg  DAILY


 PO  2/28/18 08:00


 3/28/18 08:59  3/6/18 07:46


5 MG


 


 Daptomycin 400 mg/


 Syringe  8 ml @ 4


 mls/min  Q48H


 IV  3/2/18 09:00


 3/16/18 08:59  3/6/18 07:52


4 MLS/MIN


 


 Metoprolol


 Tartrate


  (Lopressor Tab)  25 mg  DAILY


 PO  3/4/18 08:00


 3/28/18 08:59  3/6/18 07:45


25 MG


 


 Docusate Sodium


  (coLACE CAP)  100 mg  DAILY  PRN


 PO  3/4/18 16:30


 4/3/18 16:29   


 


 


 Diltiazem HCl


  (Cardizem Sr)  60 mg  DAILY


 PO  3/5/18 08:00


 4/4/18 07:59  3/6/18 07:45


60 MG


 


 Sodium Chloride  1,000 ml @ 


 100 mls/hr  Q10H


 IV  3/5/18 12:00


 4/4/18 11:59  3/6/18 07:44


100 MLS/HR











Objective


Vital Signs











  Date Time  Temp Pulse Resp B/P (MAP) Pulse Ox O2 Delivery O2 Flow Rate FiO2


 


3/6/18 07:27 36.9 71 18 169/85 (113) 95 Room Air  


 


3/6/18 00:00      Room Air  


 


3/5/18 23:25 36.7 66 18 179/83 (115) 97 Room Air  


 


3/5/18 19:30      Room Air  


 


3/5/18 16:29 36.4 51 20 164/72 (102) 99 Room Air  


 


3/5/18 16:00 36.4 51 18 164/72 (102) 99   


 


3/5/18 15:45      Room Air  


 


3/5/18 08:04 36.6 62 18 162/79 (106) 91 Room Air  











Physical Exam


Notes:


General Appearance:  Awake, alert & oriented, sitting in chair, comfortable in 

general, NAD.  


CV: +S1S2 RRR.  No peripheral edema.  


Pulm: Clear to auscultation throughout.  


Abdomen:  +BS, soft, non-distended.  Mild suprapubic/LLQ tenderness, unchanged 

from yesterday.  No guarding.  


Extremities:  No pedal edema or calf tenderness.  


Neuro: No gross neuro deficits.  


Lines: Right arm PIV.  Alex cath.





Laboratory Results


3/6/18 05:26








Red Blood Count 3.29, Mean Corpuscular Volume 85.4, Mean Corpuscular Hemoglobin 

27.1, Mean Corpuscular Hemoglobin Concent 31.7, Mean Platelet Volume 9.3, 

Neutrophils (%) (Auto) 74.7, Lymphocytes (%) (Auto) 13.2, Monocytes (%) (Auto) 

7.1, Eosinophils (%) (Auto) 3.6, Basophils (%) (Auto) 0.5, Neutrophils # (Auto) 

4.77, Lymphocytes # (Auto) 0.84, Monocytes # (Auto) 0.45, Eosinophils # (Auto) 

0.23, Basophils # (Auto) 0.03





3/6/18 05:26

















Test


  3/5/18


14:50 3/6/18


05:26


 


Urine Random Creatinine 37.0 mg/dl  


 


Urine Random Sodium 82 mEq/L  


 


White Blood Count


  


  6.38 K/uL


(4.8-10.8)


 


Red Blood Count


  


  3.29 M/uL


(4.7-6.1)


 


Hemoglobin


  


  8.9 g/dL


(14.0-18.0)


 


Hematocrit  28.1 % (42-52) 


 


Mean Corpuscular Volume


  


  85.4 fL


()


 


Mean Corpuscular Hemoglobin


  


  27.1 pg


(25-34)


 


Mean Corpuscular Hemoglobin


Concent 


  31.7 g/dl


(32-36)


 


Platelet Count


  


  181 K/uL


(130-400)


 


Mean Platelet Volume


  


  9.3 fL


(7.4-10.4)


 


Neutrophils (%) (Auto)  74.7 % 


 


Lymphocytes (%) (Auto)  13.2 % 


 


Monocytes (%) (Auto)  7.1 % 


 


Eosinophils (%) (Auto)  3.6 % 


 


Basophils (%) (Auto)  0.5 % 


 


Neutrophils # (Auto)


  


  4.77 K/uL


(1.4-6.5)


 


Lymphocytes # (Auto)


  


  0.84 K/uL


(1.2-3.4)


 


Monocytes # (Auto)


  


  0.45 K/uL


(0.11-0.59)


 


Eosinophils # (Auto)


  


  0.23 K/uL


(0-0.5)


 


Basophils # (Auto)


  


  0.03 K/uL


(0-0.2)


 


RDW Standard Deviation


  


  70.8 fL


(36.4-46.3)


 


RDW Coefficient of Variation


  


  22.8 %


(11.5-14.5)


 


Immature Granulocyte % (Auto)  0.9 % 


 


Immature Granulocyte # (Auto)


  


  0.06 K/uL


(0.00-0.02)


 


Anisocytosis  PRESENT 


 


Anion Gap


  


  9.0 mmol/L


(3-11)


 


Est Creatinine Clear Calc


Drug Dose 


  15.8 ml/min 


 


 


Estimated GFR (


American) 


  19.1 


 


 


Estimated GFR (Non-


American 


  16.5 


 


 


BUN/Creatinine Ratio  9.7 (10-20) 


 


Calcium Level


  


  8.6 mg/dl


(8.5-10.1)











Assessment and Plan


78 yo male admitted on 25 feb 2018 for bacteremia secondary to pyelonephritis, 

left VUJ obstruction, and left hydroureteronephrosis.  


PMH: RA on prednisone, BPH, anemia of chronic disease, HTN, HLD, primary OA.  

Recent admit to [facility] from 02-14Feb2018 for LINDA & UTI in setting of urinary 

retention from BPH.  





Bacteremia secondary to to pyelonephritis, left VUJ obstruction, and left 

hydroureteronephrosis:  01Mar BCx positive for MSSA.  Follow on 03Mar BCx 

negative to date and 04Mar BCx negative to date.  25Feb UCx positive for staph, 

follow on 03Mar UCx no growth.  02Mar Echo not suggestive of endocarditis.  ID 

onboard.  Had been on daptomycin for nine days.  06Mar reviewed sensitivities, 

switching to cefazolin 1 gram IV q12h (renal dosing) per prior ID 

recommendations.  


- Plan for peripheral ultrasound-guided IV for outpatient antibiotics. 





Left hydronephrosis: S/p stent placement by urology on 26Feb.  05Mar renal u/s 

notes some mild/moderate residual hydronephrosis which has not progressed.  

05Mar KUB notes appropriate stent position.  Percocet for pain management.  

Urology spoke with patient today, provided reassurance and recommended he 

follow nephrology team recommendations.  Still planning future TURP and 

possible stent removal.  





Acute kidney injury:  Cr 3.4 today, worsening.  Nephrology and urology onboard.

  Urology does not believe this is due to urinary retention (see their full note

).  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  See their 

note and related discussion with patient about if he agrees to be started on 

higher dose steroids.  Holding on order of steroids at moment for this.  


- On IVF.  Holding amlodipine.  





Left complex kidney cyst: As noted on 25Feb CT a/p.  Recommended follow-up for 

same.  





Anemia: Likely of chronic disease related to RA, worsened by hematuria.  S/p 

transfusion on 28Feb and 04Mar (two units PRBCs total).  Presently stable at Hb 

8.9.  FOBT negative.  On iron supplementation.  





BPH: Chronic x 15 years, worsening.  On flomax, proscar, ditropan TID.  





RA: On folic acid.  Off his home leflunomide (secondary to recent weight loss) 

and baseline prednisone 5 mg.  





HTN: His blood pressure has been on the higher side - as high as 180 systolic.  


- Holding ASA 81 mg given low Hgb & hematuria 


- Continue Metoprolol 25 mg daily. 


-- Previously decreased diltiazem ER from 120mg to 60mg.  Monitoring.  





Code status: Full code.  


Diet: Regular diet.


DVT prophy: SCD's.  


PT/OT: 27Feb PT initial eval, "He appears to be a good candidate for discharge 

to home from a mobility standpoint."  


Disbo: Admitted on med/surg.  Continued to discuss that patient is welcome to 

leave (AMA) at any time, but strongly advised he remain for ongoing management 

of his above issues. 





Resident Physician Supervision Note:





I interviewed and examined the patient. Discussed with Dr. Prieto and agree 

with findings and plan as documented in the note. Any exceptions or 

clarifications are listed here: None


Patient is much better mood today although occasionally getting mildly 

agitated.  There is now some concern he may have interstitial nephritis and the 

patient is requesting a second opinion by another nephrologist.  The patient is 

previously seen [facility] nephrology in the past and I personally contacted 

Dr. Rosanna Liao and she is agreed to see the patient.  I did describe the 

fact that he is we are concerned about interstitial nephritis and she says that 

was also the working diagnosis when she saw him in the past.





I also personally spoke to Dr. Sukhjinder Cm regarding this being 

obstructive uropathy and now he is believing this is likely more related to 

some intrinsic kidney disease then outlet obstruction although there is still 

plans in the works at retreating for his acute urinary tract infection and 

likely have a TURP for his BPH and elevated prostate size





His vital signs are stable with a temp of 36 9 pulse 71 respiration 18 /

55 his cardiac exam is regular lungs are clear is draining good urine with his 

Alex catheter





Attempting to minimize any medications that might be involved with his 

interstitial cystitis we have changed his daptomycin to cefazolin infectious 

disease and nephrology are following along with urology to best determine this 

patient's outcome as his creatinine has now risen for the last 2 days in a row


Documented By:  Anthony Raza





Resident Tracking


Resident Involvement:  Resident Care Provided


Care Provided:  Adult Hospital Medicine (inpatient)

## 2018-03-06 NOTE — PROGRESS NOTE
Subjective


Date of Service:


Mar 6, 2018.


Subjective


Pt evaluation today including:  conversation w/ patient, conversation w/ family

, physical exam, chart review, lab review


Voiding:  alxe catheter in place


Patient has failed to progress over the past several days, and has instead 

developed worsening renal function


Interestingly, his symptoms related to the catheter and stent have largely 

subsided over the past several days


His urine is clear at present


He denies significant flank pain


He reports he feels quite well overall


He is, however, extremely frustrated with this hospitalization





Problem List


Medical Problems:


(1) Acute renal failure


Status: Acute  





(2) Leukocytosis


Status: Acute  





(3) Urinary tract infection


Status: Acute  











Review of Systems


Constitutional:  No see HPI, No fever, No chills, No sweats, No weight loss, No 

weakness, No fatigue, No problem reported


Abdomen:  No see HPI, No pain, No nausea, No vomiting, No diarrhea, No 

constipation, No GI bleeding, No problem reported





Objective


Vital Signs











  Date Time  Temp Pulse Resp B/P (MAP) Pulse Ox O2 Delivery O2 Flow Rate FiO2


 


3/6/18 08:00      Room Air  


 


3/6/18 07:27 36.9 71 18 169/85 (113) 95 Room Air  


 


3/6/18 00:00      Room Air  


 


3/5/18 23:25 36.7 66 18 179/83 (115) 97 Room Air  


 


3/5/18 19:30      Room Air  


 


3/5/18 16:29 36.4 51 20 164/72 (102) 99 Room Air  


 


3/5/18 16:00 36.4 51 18 164/72 (102) 99   


 


3/5/18 15:45      Room Air  











Physical Exam


General Appearance:  WD/WN, no apparent distress


Eyes:  normal inspection


ENT:  hearing grossly normal


Neck:  no adenopathy


Respiratory/Chest:  no respiratory distress, no accessory muscle use


Cardiovascular:  no edema


Abdomen:  non tender, soft


Extremities:  non-tender


Neurologic/Psychiatric:  alert, normal mood/affect, oriented x 3





Laboratory Results





Last 24 Hours








Test


  3/6/18


05:26


 


White Blood Count 6.38 K/uL 


 


Red Blood Count 3.29 M/uL 


 


Hemoglobin 8.9 g/dL 


 


Hematocrit 28.1 % 


 


Mean Corpuscular Volume 85.4 fL 


 


Mean Corpuscular Hemoglobin 27.1 pg 


 


Mean Corpuscular Hemoglobin


Concent 31.7 g/dl 


 


 


Platelet Count 181 K/uL 


 


Mean Platelet Volume 9.3 fL 


 


Neutrophils (%) (Auto) 74.7 % 


 


Lymphocytes (%) (Auto) 13.2 % 


 


Monocytes (%) (Auto) 7.1 % 


 


Eosinophils (%) (Auto) 3.6 % 


 


Basophils (%) (Auto) 0.5 % 


 


Neutrophils # (Auto) 4.77 K/uL 


 


Lymphocytes # (Auto) 0.84 K/uL 


 


Monocytes # (Auto) 0.45 K/uL 


 


Eosinophils # (Auto) 0.23 K/uL 


 


Basophils # (Auto) 0.03 K/uL 


 


RDW Standard Deviation 70.8 fL 


 


RDW Coefficient of Variation 22.8 % 


 


Immature Granulocyte % (Auto) 0.9 % 


 


Immature Granulocyte # (Auto) 0.06 K/uL 


 


Anisocytosis PRESENT 


 


Sodium Level 137 mmol/L 


 


Potassium Level 4.6 mmol/L 


 


Chloride Level 106 mmol/L 


 


Carbon Dioxide Level 22 mmol/L 


 


Anion Gap 9.0 mmol/L 


 


Blood Urea Nitrogen 33 mg/dl 


 


Creatinine 3.40 mg/dl 


 


Est Creatinine Clear Calc


Drug Dose 15.8 ml/min 


 


 


Estimated GFR (


American) 19.1 


 


 


Estimated GFR (Non-


American 16.5 


 


 


BUN/Creatinine Ratio 9.7 


 


Random Glucose 83 mg/dl 


 


Calcium Level 8.6 mg/dl 











Assessment and Plan


Urinary retention; left hydronephrosis; leukocytosis; renal failure





Patient with ultrasound and KUB yesterday


These appear to show an appropriate stent position


Although he still has some hydronephrosis of the left kidney, it has not 

progressed and is likely somewhat better than it was at the time of his prior CT


He does not show any evidence of right-sided hydronephrosis


His bladder is decompressed





Overall, I have informed them that I do not believe his urinary retention and/

or hydronephrosis is the primary cause of his renal dysfunction


I have encouraged him to heed the recommendations of the nephrology team and 

consider treating him for a primary renal process





I have reassured him, however, that presuming his renal function improves, we 

will enact the plan that we previously laid out for TURP and possible stent 

removal


He has expressed good understanding that our priority now must be his renal 

function with his voiding function a secondary concern in the short term.

## 2018-03-06 NOTE — NEPHROLOGY PROGRESS NOTE
Nephrology Progress Note


Date of Service


Mar 6, 2018.





Chief Complaint


Follow-up for acute kidney injury.





Isaías Damian was seen and examined in his room this morning.  Renal function worsened 

further to creatinine 3.4 this morning, electrolyte acceptable.  Renal 

ultrasound and KUB showed left ureteral stent to be in right place.  He has 

been voiding normally.  Blood pressure relatively high.





Review of Systems


A complete review of systems was performed.  Pertinent positives are noted 

above. All other systems are negative.





Vital Signs





Last 8 Hrs








  Date Time  Temp Pulse Resp B/P (MAP) Pulse Ox O2 Delivery O2 Flow Rate FiO2


 


3/6/18 08:00      Room Air  


 


3/6/18 07:27 36.9 71 18 169/85 (113) 95 Room Air  











Last Recorded Weight


Weight (Kilograms):  65.700





Physical Exam


GENERAL: Elderly male, AAA x 3,  pleasant, healthy-appearing, not in any 

distress.


NECK: Supple, no JVD.


RESPIRATORY: Normal breathing efforts, no accessory muscle use, clear to 

auscultation bilaterally, no wheezes or rales.


CARDIOVASCULAR: S1, S2 normal, rate rhythm regular.


EXTREMITY: No lower extremity edema


NEURO: speech fluent.


PSYCHIATRY: Normal mood and judgment





Family History





FH: CAD (coronary artery disease)


  BROTHER





Negative for CKD / ESRD





Social History


Smoking Status:  Former smoker


Marital Status:  


Housing Status:  lives with family


Occupation:  retired





.  Retired.  Formerly worked at [employer], later owned a 

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





Laboratory Results


Past 24 Hours


3/6/18 05:26








Red Blood Count 3.29, Mean Corpuscular Volume 85.4, Mean Corpuscular Hemoglobin 

27.1, Mean Corpuscular Hemoglobin Concent 31.7, Mean Platelet Volume 9.3, 

Neutrophils (%) (Auto) 74.7, Lymphocytes (%) (Auto) 13.2, Monocytes (%) (Auto) 

7.1, Eosinophils (%) (Auto) 3.6, Basophils (%) (Auto) 0.5, Neutrophils # (Auto) 

4.77, Lymphocytes # (Auto) 0.84, Monocytes # (Auto) 0.45, Eosinophils # (Auto) 

0.23, Basophils # (Auto) 0.03





3/6/18 05:26

















Test


  3/5/18


14:50 3/6/18


05:26


 


Urine Random Creatinine 37.0 mg/dl  


 


Urine Random Sodium 82 mEq/L  


 


White Blood Count


  


  6.38 K/uL


(4.8-10.8)


 


Red Blood Count


  


  3.29 M/uL


(4.7-6.1)


 


Hemoglobin


  


  8.9 g/dL


(14.0-18.0)


 


Hematocrit  28.1 % (42-52) 


 


Mean Corpuscular Volume


  


  85.4 fL


()


 


Mean Corpuscular Hemoglobin


  


  27.1 pg


(25-34)


 


Mean Corpuscular Hemoglobin


Concent 


  31.7 g/dl


(32-36)


 


Platelet Count


  


  181 K/uL


(130-400)


 


Mean Platelet Volume


  


  9.3 fL


(7.4-10.4)


 


Neutrophils (%) (Auto)  74.7 % 


 


Lymphocytes (%) (Auto)  13.2 % 


 


Monocytes (%) (Auto)  7.1 % 


 


Eosinophils (%) (Auto)  3.6 % 


 


Basophils (%) (Auto)  0.5 % 


 


Neutrophils # (Auto)


  


  4.77 K/uL


(1.4-6.5)


 


Lymphocytes # (Auto)


  


  0.84 K/uL


(1.2-3.4)


 


Monocytes # (Auto)


  


  0.45 K/uL


(0.11-0.59)


 


Eosinophils # (Auto)


  


  0.23 K/uL


(0-0.5)


 


Basophils # (Auto)


  


  0.03 K/uL


(0-0.2)


 


RDW Standard Deviation


  


  70.8 fL


(36.4-46.3)


 


RDW Coefficient of Variation


  


  22.8 %


(11.5-14.5)


 


Immature Granulocyte % (Auto)  0.9 % 


 


Immature Granulocyte # (Auto)


  


  0.06 K/uL


(0.00-0.02)


 


Anisocytosis  PRESENT 


 


Anion Gap


  


  9.0 mmol/L


(3-11)


 


Est Creatinine Clear Calc


Drug Dose 


  15.8 ml/min 


 


 


Estimated GFR (


American) 


  19.1 


 


 


Estimated GFR (Non-


American 


  16.5 


 


 


BUN/Creatinine Ratio  9.7 (10-20) 


 


Calcium Level


  


  8.6 mg/dl


(8.5-10.1)











Allergies


Coded Allergies:  


     Lisinopril (Verified  Adverse Reaction, Mild, COUGH, 12/13/17)





Medications





Current Inpatient Medications








 Medications


  (Trade)  Dose


 Ordered  Sig/Juaquin


 Route  Start Time


 Stop Time Status Last Admin


Dose Admin


 


 Acetaminophen


  (Tylenol Tab)  650 mg  Q4H  PRN


 PO  2/25/18 15:45


 3/27/18 15:44   


 


 


 Al Hydrox/Mg


 Hydrox/Simethicone


  (Maalox Max Susp)  15 ml  Q4H  PRN


 PO  2/25/18 15:45


 3/27/18 15:44   


 


 


 Magnesium


 Hydroxide


  (Milk Of


 Magnesia Susp)  30 ml  Q6H  PRN


 PO  2/25/18 15:45


 3/27/18 15:44  3/5/18 16:23


30 ML


 


 Polyethylene


  (Miralax Powder


 Packet)  17 gm  DAILY  PRN


 PO  2/25/18 15:45


 3/27/18 15:44  3/5/18 05:41


17 GM


 


 Ondansetron HCl


  (Zofran Inj)  4 mg  Q6H  PRN


 IV  2/25/18 15:45


 3/27/18 15:44   


 


 


 Aspirin


  (Ecotrin Tab)  81 mg  BID


 PO  2/25/18 20:00


 3/27/18 20:59 Future Hold 2/28/18 08:09


81 MG


 


 Finasteride


  (Proscar Tab)  5 mg  HS


 PO  2/25/18 21:00


 3/27/18 20:59  3/5/18 21:20


5 MG


 


 Folic Acid


  (Folvite Tab)  1 mg  QAM


 PO  2/26/18 08:00


 3/28/18 08:59  3/6/18 07:45


1 MG


 


 Multivitamins


  (Multivitamin


 Tab)  1 tab  QAM


 PO  2/26/18 08:00


 3/28/18 08:59  3/6/18 07:46


1 TAB


 


 Potassium Chloride


  (Klor-Con Tab)  20 meq  BID


 PO  2/25/18 20:00


 3/27/18 20:59  3/6/18 07:45


20 MEQ


 


 Ranitidine HCl


  (zANTac TAB)  150 mg  BID


 PO  2/25/18 20:00


 3/27/18 20:59  3/6/18 07:46


150 MG


 


 Tamsulosin HCl


  (Flomax Cap)  0.4 mg  DAILY


 PO  2/26/18 08:00


 3/28/18 08:59  3/6/18 07:45


0.4 MG


 


 Ferrous Sulfate


  (Feosol Tab)  325 mg  BIDM


 PO  2/25/18 17:00


 3/27/18 17:59  3/6/18 07:45


325 MG


 


 Oxycodone/


 Acetaminophen


  (Percocet


 5-325mg Tab)  1 tab  Q4H  PRN


 PO  2/25/18 15:45


 3/11/18 15:44  3/6/18 07:51


1 TAB


 


 Oxybutynin


 Chloride


  (Ditropan Tab)  5 mg  TID


 PO  2/27/18 14:00


 3/29/18 13:59  3/6/18 13:12


5 MG


 


 Daptomycin 400 mg/


 Syringe  8 ml @ 4


 mls/min  Q48H


 IV  3/2/18 09:00


 3/16/18 08:59  3/6/18 07:52


4 MLS/MIN


 


 Metoprolol


 Tartrate


  (Lopressor Tab)  25 mg  DAILY


 PO  3/4/18 08:00


 3/28/18 08:59  3/6/18 07:45


25 MG


 


 Docusate Sodium


  (coLACE CAP)  100 mg  DAILY  PRN


 PO  3/4/18 16:30


 4/3/18 16:29   


 


 


 Diltiazem HCl


  (Cardizem Sr)  60 mg  DAILY


 PO  3/5/18 08:00


 4/4/18 07:59  3/6/18 07:45


60 MG











Impression





(1) Acute renal failure


(2) Hydronephrosis


(3) Kidney stones


(4) Pyelonephritis


(5) BPH (benign prostatic hyperplasia)


(6) Renal cyst


(7) Chronic anemia


77-year-old gentlemen with acute kidney injury with left-sided hydronephrosis 

status post  ureteral stent.  Creatinine initially improved from 4.1-2.6 and had

, had been stable for several days however started to worsen again, creatinine 

3.1 this morning.  Electrolyte acceptable.  Blood pressure well controlled.


Differentials for acute kidney injury include volume depletion with diuresis 

with net negative more than 1 liter daily, acute interstitial nephritis with 

different antibiotics including daptomycin which has been over last few days, 

possible stent dislodgement or dysfunction and recurrent hydronephrosis on the 

left side.





Recommendations





--renal function continues to worsen, no evidence of have volume depletion or 

postrenal obstruction.  Urinalysis with pyuria without any evidence of 

bacteriuria.  There is concern for possible acute interstitial nephritis


--discontinue IV fluid, DC Protonix, continue on Ranitidine


--as patient has MSSA, consider discontinuing daptomycin and switched to Bactrim


--recommended starting empirically on high dose steroid for possible acute 

interstitial nephritis.  Considering recent bacteremia, left hydronephrosis 

status post stent, would hold off on biopsy for now.  As biopsy also require 

transfer to another facility it is reasonable to start on steroid to see 

response.  Explained the risk associated with high dose steroid including poorly

-controlled hypertension, hyperglycemia and increased risk of infection or more 

change.  Patient initially agreed to be on steroid however he repeatedly kept 

saying he points to get discharged and extremely frustrated that he has to stay 

in hospital.  Explained that with progressive worsening of renal function he is 

at risk for have electrolyte abnormality, change in volume status or further 

worsening of hypertension and recommended to continue to be inpatient until we 

see improvement in renal function.  Hospital stay would not be for steroid use 

as will keep the steroid orally and he can be discharged if we see improvement 

in renal function.  However patient became very frustrated and he fell like he 

needs time to think about and also wanted to get a 2nd opinion from another 

physician.  Offered discussion with Dr. Raza for consultation with 

[facility] nephrology however patient wanted to take time and think about it and 

let us know.  Discussed with Dr. Raza and offered my availability if 

patient needs to discuss any further or wants the to start on steroid.





Will follow.

## 2018-03-07 VITALS
TEMPERATURE: 97.16 F | SYSTOLIC BLOOD PRESSURE: 186 MMHG | DIASTOLIC BLOOD PRESSURE: 90 MMHG | HEART RATE: 72 BPM | OXYGEN SATURATION: 95 %

## 2018-03-07 VITALS
DIASTOLIC BLOOD PRESSURE: 89 MMHG | SYSTOLIC BLOOD PRESSURE: 193 MMHG | TEMPERATURE: 97.88 F | OXYGEN SATURATION: 97 % | HEART RATE: 64 BPM

## 2018-03-07 VITALS
SYSTOLIC BLOOD PRESSURE: 168 MMHG | HEART RATE: 88 BPM | OXYGEN SATURATION: 94 % | TEMPERATURE: 97.88 F | DIASTOLIC BLOOD PRESSURE: 76 MMHG

## 2018-03-07 VITALS — OXYGEN SATURATION: 95 %

## 2018-03-07 VITALS — SYSTOLIC BLOOD PRESSURE: 174 MMHG | DIASTOLIC BLOOD PRESSURE: 76 MMHG

## 2018-03-07 LAB
BASOPHILS # BLD: 0.01 K/UL (ref 0–0.2)
BASOPHILS NFR BLD: 0.2 %
BUN SERPL-MCNC: 36 MG/DL (ref 7–18)
CALCIUM SERPL-MCNC: 8.8 MG/DL (ref 8.5–10.1)
CO2 SERPL-SCNC: 20 MMOL/L (ref 21–32)
CREAT SERPL-MCNC: 3.15 MG/DL (ref 0.6–1.4)
EOS ABS #: 0 K/UL (ref 0–0.5)
EOSINOPHIL NFR BLD AUTO: 202 K/UL (ref 130–400)
GLUCOSE SERPL-MCNC: 194 MG/DL (ref 70–99)
HCT VFR BLD CALC: 28.2 % (ref 42–52)
HGB BLD-MCNC: 9 G/DL (ref 14–18)
IG#: 0.04 K/UL (ref 0–0.02)
IMM GRANULOCYTES NFR BLD AUTO: 7.9 %
LYMPHOCYTES # BLD: 0.44 K/UL (ref 1.2–3.4)
MCH RBC QN AUTO: 27.3 PG (ref 25–34)
MCHC RBC AUTO-ENTMCNC: 31.9 G/DL (ref 32–36)
MCV RBC AUTO: 85.5 FL (ref 80–100)
MONO ABS #: 0.05 K/UL (ref 0.11–0.59)
MONOCYTES NFR BLD: 0.9 %
NEUT ABS #: 5.01 K/UL (ref 1.4–6.5)
NEUTROPHILS # BLD AUTO: 0 %
NEUTROPHILS NFR BLD AUTO: 90.3 %
PMV BLD AUTO: 9.8 FL (ref 7.4–10.4)
POTASSIUM SERPL-SCNC: 4.8 MMOL/L (ref 3.5–5.1)
RED CELL DISTRIBUTION WIDTH CV: 22.1 % (ref 11.5–14.5)
RED CELL DISTRIBUTION WIDTH SD: 69.8 FL (ref 36.4–46.3)
SODIUM SERPL-SCNC: 135 MMOL/L (ref 136–145)
WBC # BLD AUTO: 5.55 K/UL (ref 4.8–10.8)

## 2018-03-07 RX ADMIN — Medication SCH TAB: at 07:53

## 2018-03-07 RX ADMIN — POTASSIUM CHLORIDE SCH MEQ: 1500 TABLET, EXTENDED RELEASE ORAL at 07:53

## 2018-03-07 RX ADMIN — FINASTERIDE SCH MG: 5 TABLET, FILM COATED ORAL at 20:53

## 2018-03-07 RX ADMIN — OXYBUTYNIN CHLORIDE SCH MG: 5 TABLET ORAL at 20:53

## 2018-03-07 RX ADMIN — TAMSULOSIN HYDROCHLORIDE SCH MG: 0.4 CAPSULE ORAL at 07:52

## 2018-03-07 RX ADMIN — POTASSIUM CHLORIDE SCH MEQ: 1500 TABLET, EXTENDED RELEASE ORAL at 20:54

## 2018-03-07 RX ADMIN — FERROUS SULFATE TAB EC 325 MG (65 MG FE EQUIVALENT) SCH MG: 325 (65 FE) TABLET DELAYED RESPONSE at 07:51

## 2018-03-07 RX ADMIN — RANITIDINE SCH MG: 150 TABLET ORAL at 07:54

## 2018-03-07 RX ADMIN — CEFAZOLIN SCH MLS/HR: 10 INJECTION, POWDER, FOR SOLUTION INTRAVENOUS at 05:02

## 2018-03-07 RX ADMIN — DILTIAZEM HYDROCHLORIDE SCH MG: 60 CAPSULE, EXTENDED RELEASE ORAL at 07:49

## 2018-03-07 RX ADMIN — RANITIDINE SCH MG: 150 TABLET ORAL at 20:54

## 2018-03-07 RX ADMIN — METOPROLOL TARTRATE SCH MG: 25 TABLET, FILM COATED ORAL at 07:53

## 2018-03-07 RX ADMIN — OXYBUTYNIN CHLORIDE SCH MG: 5 TABLET ORAL at 07:50

## 2018-03-07 RX ADMIN — CEFAZOLIN SCH MLS/HR: 10 INJECTION, POWDER, FOR SOLUTION INTRAVENOUS at 16:37

## 2018-03-07 RX ADMIN — FERROUS SULFATE TAB EC 325 MG (65 MG FE EQUIVALENT) SCH MG: 325 (65 FE) TABLET DELAYED RESPONSE at 16:37

## 2018-03-07 RX ADMIN — OXYBUTYNIN CHLORIDE SCH MG: 5 TABLET ORAL at 14:12

## 2018-03-07 NOTE — PROGRESS NOTE
Progress Note


Date of Service


Mar 7, 2018.





Progress Note


Continues to feel quite well


- catheter and stent tolerable





3/7/18 05:40








Red Blood Count 3.30, Mean Corpuscular Volume 85.5, Mean Corpuscular Hemoglobin 

27.3, Mean Corpuscular Hemoglobin Concent 31.9, Mean Platelet Volume 9.8, 

Neutrophils (%) (Auto) 90.3, Lymphocytes (%) (Auto) 7.9, Monocytes (%) (Auto) 

0.9, Eosinophils (%) (Auto) 0.0, Basophils (%) (Auto) 0.2, Neutrophils # (Auto) 

5.01, Lymphocytes # (Auto) 0.44, Monocytes # (Auto) 0.05, Eosinophils # (Auto) 

0.00, Basophils # (Auto) 0.01





3/7/18 05:40

















Test


  3/7/18


05:40


 


White Blood Count


  5.55 K/uL


(4.8-10.8)


 


Red Blood Count


  3.30 M/uL


(4.7-6.1)


 


Hemoglobin


  9.0 g/dL


(14.0-18.0)


 


Hematocrit 28.2 % (42-52) 


 


Mean Corpuscular Volume


  85.5 fL


()


 


Mean Corpuscular Hemoglobin


  27.3 pg


(25-34)


 


Mean Corpuscular Hemoglobin


Concent 31.9 g/dl


(32-36)


 


Platelet Count


  202 K/uL


(130-400)


 


Mean Platelet Volume


  9.8 fL


(7.4-10.4)


 


Neutrophils (%) (Auto) 90.3 % 


 


Lymphocytes (%) (Auto) 7.9 % 


 


Monocytes (%) (Auto) 0.9 % 


 


Eosinophils (%) (Auto) 0.0 % 


 


Basophils (%) (Auto) 0.2 % 


 


Neutrophils # (Auto)


  5.01 K/uL


(1.4-6.5)


 


Lymphocytes # (Auto)


  0.44 K/uL


(1.2-3.4)


 


Monocytes # (Auto)


  0.05 K/uL


(0.11-0.59)


 


Eosinophils # (Auto)


  0.00 K/uL


(0-0.5)


 


Basophils # (Auto)


  0.01 K/uL


(0-0.2)


 


RDW Standard Deviation


  69.8 fL


(36.4-46.3)


 


RDW Coefficient of Variation


  22.1 %


(11.5-14.5)


 


Immature Granulocyte % (Auto) 0.7 % 


 


Immature Granulocyte # (Auto)


  0.04 K/uL


(0.00-0.02)


 


Anisocytosis PRESENT 


 


Anion Gap


  9.0 mmol/L


(3-11)


 


Est Creatinine Clear Calc


Drug Dose 17.1 ml/min 


 


 


Estimated GFR (


American) 20.9 


 


 


Estimated GFR (Non-


American 18.1 


 


 


BUN/Creatinine Ratio 11.5 (10-20) 


 


Calcium Level


  8.8 mg/dl


(8.5-10.1)








Vital Signs Past 12 Hours








  Date Time  Temp Pulse Resp B/P (MAP) Pulse Ox O2 Delivery O2 Flow Rate FiO2


 


3/7/18 11:43    174/76 (108)    


 


3/7/18 07:40 36.2 72 16 186/90 (122) 95 Room Air  





NAD


aaox 3


no resp distress


abd soft


- urine clear





A./P: Renal failure, unilateral hydro, urinary retention


- improvement in renal function overnight


- reassured him and encouraged him to be patient


- hold on any  intervention until acute issues are resolved

## 2018-03-07 NOTE — FAMILY MEDICINE PROGRESS NOTE
Progress Note


Date of Service


Mar 7, 2018.





Subjective


Pt evaluation today including:  conversation w/ patient


Found patient sitting in bedside chair.  He notes unchanged mild suprapubic 

discomfort but otherwise no new physical concerns.  He continues to express 

improved understanding of his overall medical condition and treatment plan.  No 

immediate questions about that this early morning.





   Constitutional:  No fever, No chills


   Respiratory:  No cough, No shortness of breath


   Cardiovascular:  No chest pain, No edema


   Abdomen:  No pain, No nausea, No vomiting





Medications





Current Inpatient Medications








 Medications


  (Trade)  Dose


 Ordered  Sig/Juaquin


 Route  Start Time


 Stop Time Status Last Admin


Dose Admin


 


 Acetaminophen


  (Tylenol Tab)  650 mg  Q4H  PRN


 PO  2/25/18 15:45


 3/27/18 15:44   


 


 


 Al Hydrox/Mg


 Hydrox/Simethicone


  (Maalox Max Susp)  15 ml  Q4H  PRN


 PO  2/25/18 15:45


 3/27/18 15:44   


 


 


 Magnesium


 Hydroxide


  (Milk Of


 Magnesia Susp)  30 ml  Q6H  PRN


 PO  2/25/18 15:45


 3/27/18 15:44  3/5/18 16:23


30 ML


 


 Polyethylene


  (Miralax Powder


 Packet)  17 gm  DAILY  PRN


 PO  2/25/18 15:45


 3/27/18 15:44  3/5/18 05:41


17 GM


 


 Ondansetron HCl


  (Zofran Inj)  4 mg  Q6H  PRN


 IV  2/25/18 15:45


 3/27/18 15:44   


 


 


 Aspirin


  (Ecotrin Tab)  81 mg  BID


 PO  2/25/18 20:00


 3/27/18 20:59 Future Hold 2/28/18 08:09


81 MG


 


 Finasteride


  (Proscar Tab)  5 mg  HS


 PO  2/25/18 21:00


 3/27/18 20:59  3/6/18 19:43


5 MG


 


 Folic Acid


  (Folvite Tab)  1 mg  QAM


 PO  2/26/18 08:00


 3/28/18 08:59  3/7/18 07:52


1 MG


 


 Multivitamins


  (Multivitamin


 Tab)  1 tab  QAM


 PO  2/26/18 08:00


 3/28/18 08:59  3/7/18 07:53


1 TAB


 


 Potassium Chloride


  (Klor-Con Tab)  20 meq  BID


 PO  2/25/18 20:00


 3/27/18 20:59  3/7/18 07:53


20 MEQ


 


 Ranitidine HCl


  (zANTac TAB)  150 mg  BID


 PO  2/25/18 20:00


 3/27/18 20:59  3/7/18 07:54


150 MG


 


 Tamsulosin HCl


  (Flomax Cap)  0.4 mg  DAILY


 PO  2/26/18 08:00


 3/28/18 08:59  3/7/18 07:52


0.4 MG


 


 Ferrous Sulfate


  (Feosol Tab)  325 mg  BIDM


 PO  2/25/18 17:00


 3/27/18 17:59  3/7/18 07:51


325 MG


 


 Oxycodone/


 Acetaminophen


  (Percocet


 5-325mg Tab)  1 tab  Q4H  PRN


 PO  2/25/18 15:45


 3/11/18 15:44  3/6/18 07:51


1 TAB


 


 Oxybutynin


 Chloride


  (Ditropan Tab)  5 mg  TID


 PO  2/27/18 14:00


 3/29/18 13:59  3/7/18 07:50


5 MG


 


 Metoprolol


 Tartrate


  (Lopressor Tab)  25 mg  DAILY


 PO  3/4/18 08:00


 3/28/18 08:59  3/7/18 07:53


25 MG


 


 Docusate Sodium


  (coLACE CAP)  100 mg  DAILY  PRN


 PO  3/4/18 16:30


 4/3/18 16:29   


 


 


 Diltiazem HCl


  (Cardizem Sr)  60 mg  DAILY


 PO  3/5/18 08:00


 4/4/18 07:59  3/7/18 07:49


60 MG


 


 Cefazolin Sodium


 1000 mg/Syringe  7.5 ml @ 


 100 mls/hr  Q12H


 IV  3/6/18 17:00


 3/20/18 16:59  3/7/18 05:02


100 MLS/HR


 


 Prednisone


  (PredniSONE TAB)  60 mg  DAILY


 PO  3/7/18 08:00


 4/6/18 07:59  3/7/18 07:54


60 MG











Objective


Vital Signs











  Date Time  Temp Pulse Resp B/P (MAP) Pulse Ox O2 Delivery O2 Flow Rate FiO2


 


3/7/18 07:40 36.2 72 16 186/90 (122) 95   


 


3/7/18 00:00 36.6 88 18 168/76 (106) 94   


 


3/7/18 00:00     94 Room Air  


 


3/6/18 15:45      Room Air  


 


3/6/18 08:00      Room Air  











Physical Exam


Notes:


General Appearance:  Awake, alert & oriented, sitting in chair, comfortable in 

general, NAD.  


CV: +S1S2 RRR.  No peripheral edema.  


Pulm: Clear to auscultation throughout.  


Abdomen:  +BS, soft, non-distended.  Mild suprapubic/LLQ tenderness, unchanged 

from prior two days.  No guarding.  


Extremities:  No pedal edema or calf tenderness.  


Neuro: No gross neuro deficits.  


Lines: Left arm PIV.  Lutz cath.





Laboratory Results


3/7/18 05:40








Red Blood Count 3.30, Mean Corpuscular Volume 85.5, Mean Corpuscular Hemoglobin 

27.3, Mean Corpuscular Hemoglobin Concent 31.9, Mean Platelet Volume 9.8, 

Neutrophils (%) (Auto) 90.3, Lymphocytes (%) (Auto) 7.9, Monocytes (%) (Auto) 

0.9, Eosinophils (%) (Auto) 0.0, Basophils (%) (Auto) 0.2, Neutrophils # (Auto) 

5.01, Lymphocytes # (Auto) 0.44, Monocytes # (Auto) 0.05, Eosinophils # (Auto) 

0.00, Basophils # (Auto) 0.01





3/7/18 05:40

















Test


  3/7/18


05:40


 


White Blood Count


  5.55 K/uL


(4.8-10.8)


 


Red Blood Count


  3.30 M/uL


(4.7-6.1)


 


Hemoglobin


  9.0 g/dL


(14.0-18.0)


 


Hematocrit 28.2 % (42-52) 


 


Mean Corpuscular Volume


  85.5 fL


()


 


Mean Corpuscular Hemoglobin


  27.3 pg


(25-34)


 


Mean Corpuscular Hemoglobin


Concent 31.9 g/dl


(32-36)


 


Platelet Count


  202 K/uL


(130-400)


 


Mean Platelet Volume


  9.8 fL


(7.4-10.4)


 


Neutrophils (%) (Auto) 90.3 % 


 


Lymphocytes (%) (Auto) 7.9 % 


 


Monocytes (%) (Auto) 0.9 % 


 


Eosinophils (%) (Auto) 0.0 % 


 


Basophils (%) (Auto) 0.2 % 


 


Neutrophils # (Auto)


  5.01 K/uL


(1.4-6.5)


 


Lymphocytes # (Auto)


  0.44 K/uL


(1.2-3.4)


 


Monocytes # (Auto)


  0.05 K/uL


(0.11-0.59)


 


Eosinophils # (Auto)


  0.00 K/uL


(0-0.5)


 


Basophils # (Auto)


  0.01 K/uL


(0-0.2)


 


RDW Standard Deviation


  69.8 fL


(36.4-46.3)


 


RDW Coefficient of Variation


  22.1 %


(11.5-14.5)


 


Immature Granulocyte % (Auto) 0.7 % 


 


Immature Granulocyte # (Auto)


  0.04 K/uL


(0.00-0.02)


 


Anisocytosis PRESENT 


 


Anion Gap


  9.0 mmol/L


(3-11)


 


Est Creatinine Clear Calc


Drug Dose 17.1 ml/min 


 


 


Estimated GFR (


American) 20.9 


 


 


Estimated GFR (Non-


American 18.1 


 


 


BUN/Creatinine Ratio 11.5 (10-20) 


 


Calcium Level


  8.8 mg/dl


(8.5-10.1)











Assessment and Plan


76 yo male admitted on 25 feb 2018 for bacteremia secondary to pyelonephritis, 

left VUJ obstruction, and left hydroureteronephrosis.  


PMH: RA on prednisone, BPH, anemia of chronic disease, HTN, HLD, primary OA.  

Recent admit to [facility] from 02-14Feb2018 for LINDA & UTI in setting of urinary 

retention from BPH.  





Bacteremia secondary to to pyelonephritis, left VUJ obstruction, and left 

hydroureteronephrosis:  01Mar BCx positive for MSSA.  Follow on 03Mar BCx 

negative to date and 04Mar BCx negative to date.  25Feb UCx positive for staph, 

follow on 03Mar UCx no growth.  02Mar Echo not suggestive of endocarditis.  ID 

onboard.  Had been on daptomycin for nine days.  06Mar switched to cefazolin 1 

gram IV q12h (renal dosing) per prior ID recommendations.  


- Plan for peripheral ultrasound-guided IV for outpatient antibiotics.  Goal 

placement today.  





Left hydronephrosis: S/p stent placement by urology on 26Feb.  05Mar renal u/s 

notes some mild/moderate residual hydronephrosis which has not progressed.  

05Mar KUB notes appropriate stent position.  Percocet for pain management.  

Urology spoke with patient, provided reassurance and recommended he follow 

nephrology team recommendations.  Still planning future TURP and possible stent 

removal.  





Acute kidney injury:  Cr 3.1 today (max 3.4), improved.  Nephrology and urology 

onboard.  Urology does not believe this is due to urinary retention (see their 

full note).  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  

See their notes for thorough discussion of recent events.  Was placed back on 

steroids 06Mar evening with planned prednisone 60 mg daily for near future (and 

eventual goal of taper).  


- Holding amlodipine.  





Left complex kidney cyst: As noted on 25Feb CT a/p.  Recommended follow-up for 

same.  





Anemia: Likely of chronic disease related to RA, worsened by hematuria.  S/p 

transfusion on 28Feb and 04Mar (two units PRBCs total).  Presently stable at Hb 

9.0.  FOBT negative.  On iron supplementation.  





BPH: Chronic x 15 years, worsening.  On flomax, proscar, ditropan TID.  





Rheumatoid arthritis: On folic acid.  Off his home leflunomide (secondary to 

recent weight loss) and baseline prednisone 5 mg.  





HTN: Holding ASA 81 mg given low Hb & hematuria.  Increased his metoprolol to 

50 mg daily and his diltiazem ER to 120 mg daily to help with blood pressure 

control.  





Code status: Full code.  


Diet: Regular diet.


DVT prophy: SCD's.  


PT/OT: 27Feb PT initial eval, "He appears to be a good candidate for discharge 

to home from a mobility standpoint."  


Disbo: Admitted on med/surg.  Patient agreed to remain in the hospital for 24 

more hours to see if Cr continues to improve on steroids. 





Resident Physician Supervision Note:





I interviewed and examined the patient. Discussed with Dr. Prieto and agree 

with findings and plan as documented in the note. Any exceptions or 

clarifications are listed here: None


After a second opinion from nephrology at this concur with interstitial 

nephritis the patient was instituted on steroid therapy last evening he did 

have a slight improvement in his creatinine patient is encouraged by this he 

otherwise has no complaints or problems he also did change his medications 

stopping his PPI and changing his antibiotics to cefazolin


Vital signs are stable today cardiac exam is regular lungs clear abdomen 

normoactive bowel sounds





Acute renal failure initially thought to be obstructive uropathy but now may 

also be involving interstitial nephritis with concurrent urinary tract 

infection continuing cefazolin plus steroids at 1 mg/kg being 60 mg of 

prednisone daily if his creatinine continues to improve he may be discharged 

home with careful outpatient surveillance in the next day or 2


Documented By:  Anthony Raza





Resident Tracking


Resident Involvement:  Resident Care Provided


Care Provided:  Adult Hospital Medicine (inpatient)

## 2018-03-07 NOTE — NEPHROLOGY PROGRESS NOTE
Nephrology Progress Note


Date of Service


Mar 7, 2018.





Chief Complaint


Follow-up for acute kidney injury.





Subjective


Rickie was seen and examined in his room this morning.  He otherwise feels well, 

denies any chest pain or shortness of Breath, appetite has been decent.  Blood 

pressure running high.  Urine output decent, net negative.  Creatinine slightly 

improved to 3.2 today, electrolyte acceptable.





Review of Systems


A complete review of systems was performed.  Pertinent positives are noted 

above. All other systems are negative.





Vital Signs





Last 8 Hrs








  Date Time  Temp Pulse Resp B/P (MAP) Pulse Ox O2 Delivery O2 Flow Rate FiO2


 


3/7/18 07:40 36.2 72 16 186/90 (122) 95 Room Air  











Last Recorded Weight


Weight (Kilograms):  65.700





Physical Exam


GENERAL: Elderly male, AAA x 3, not in any distress.


NECK: Supple, no JVD.


RESPIRATORY: Normal breathing efforts, no accessory muscle use, clear to 

auscultation bilaterally, no wheezes or rales.


CARDIOVASCULAR: S1, S2 normal, rate rhythm regular.


EXTREMITY: No lower extremity edema


NEURO: speech fluent.


PSYCHIATRY: Normal mood and judgment





Family History





FH: CAD (coronary artery disease)


  BROTHER





Negative for CKD / ESRD





Social History


Smoking Status:  Former smoker


Marital Status:  


Housing Status:  lives with family


Occupation:  retired





.  Retired.  Formerly worked at [employer], later owned a 

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





Laboratory Results


Past 24 Hours


3/7/18 05:40








Red Blood Count 3.30, Mean Corpuscular Volume 85.5, Mean Corpuscular Hemoglobin 

27.3, Mean Corpuscular Hemoglobin Concent 31.9, Mean Platelet Volume 9.8, 

Neutrophils (%) (Auto) 90.3, Lymphocytes (%) (Auto) 7.9, Monocytes (%) (Auto) 

0.9, Eosinophils (%) (Auto) 0.0, Basophils (%) (Auto) 0.2, Neutrophils # (Auto) 

5.01, Lymphocytes # (Auto) 0.44, Monocytes # (Auto) 0.05, Eosinophils # (Auto) 

0.00, Basophils # (Auto) 0.01





3/7/18 05:40

















Test


  3/7/18


05:40


 


White Blood Count


  5.55 K/uL


(4.8-10.8)


 


Red Blood Count


  3.30 M/uL


(4.7-6.1)


 


Hemoglobin


  9.0 g/dL


(14.0-18.0)


 


Hematocrit 28.2 % (42-52) 


 


Mean Corpuscular Volume


  85.5 fL


()


 


Mean Corpuscular Hemoglobin


  27.3 pg


(25-34)


 


Mean Corpuscular Hemoglobin


Concent 31.9 g/dl


(32-36)


 


Platelet Count


  202 K/uL


(130-400)


 


Mean Platelet Volume


  9.8 fL


(7.4-10.4)


 


Neutrophils (%) (Auto) 90.3 % 


 


Lymphocytes (%) (Auto) 7.9 % 


 


Monocytes (%) (Auto) 0.9 % 


 


Eosinophils (%) (Auto) 0.0 % 


 


Basophils (%) (Auto) 0.2 % 


 


Neutrophils # (Auto)


  5.01 K/uL


(1.4-6.5)


 


Lymphocytes # (Auto)


  0.44 K/uL


(1.2-3.4)


 


Monocytes # (Auto)


  0.05 K/uL


(0.11-0.59)


 


Eosinophils # (Auto)


  0.00 K/uL


(0-0.5)


 


Basophils # (Auto)


  0.01 K/uL


(0-0.2)


 


RDW Standard Deviation


  69.8 fL


(36.4-46.3)


 


RDW Coefficient of Variation


  22.1 %


(11.5-14.5)


 


Immature Granulocyte % (Auto) 0.7 % 


 


Immature Granulocyte # (Auto)


  0.04 K/uL


(0.00-0.02)


 


Anisocytosis PRESENT 


 


Anion Gap


  9.0 mmol/L


(3-11)


 


Est Creatinine Clear Calc


Drug Dose 17.1 ml/min 


 


 


Estimated GFR (


American) 20.9 


 


 


Estimated GFR (Non-


American 18.1 


 


 


BUN/Creatinine Ratio 11.5 (10-20) 


 


Calcium Level


  8.8 mg/dl


(8.5-10.1)











Allergies


Coded Allergies:  


     Lisinopril (Verified  Adverse Reaction, Mild, COUGH, 12/13/17)





Medications





Current Inpatient Medications








 Medications


  (Trade)  Dose


 Ordered  Sig/Juaquin


 Route  Start Time


 Stop Time Status Last Admin


Dose Admin


 


 Acetaminophen


  (Tylenol Tab)  650 mg  Q4H  PRN


 PO  2/25/18 15:45


 3/27/18 15:44   


 


 


 Al Hydrox/Mg


 Hydrox/Simethicone


  (Maalox Max Susp)  15 ml  Q4H  PRN


 PO  2/25/18 15:45


 3/27/18 15:44   


 


 


 Magnesium


 Hydroxide


  (Milk Of


 Magnesia Susp)  30 ml  Q6H  PRN


 PO  2/25/18 15:45


 3/27/18 15:44  3/5/18 16:23


30 ML


 


 Polyethylene


  (Miralax Powder


 Packet)  17 gm  DAILY  PRN


 PO  2/25/18 15:45


 3/27/18 15:44  3/5/18 05:41


17 GM


 


 Ondansetron HCl


  (Zofran Inj)  4 mg  Q6H  PRN


 IV  2/25/18 15:45


 3/27/18 15:44   


 


 


 Aspirin


  (Ecotrin Tab)  81 mg  BID


 PO  2/25/18 20:00


 3/27/18 20:59 Future Hold 2/28/18 08:09


81 MG


 


 Finasteride


  (Proscar Tab)  5 mg  HS


 PO  2/25/18 21:00


 3/27/18 20:59  3/6/18 19:43


5 MG


 


 Folic Acid


  (Folvite Tab)  1 mg  QAM


 PO  2/26/18 08:00


 3/28/18 08:59  3/7/18 07:52


1 MG


 


 Multivitamins


  (Multivitamin


 Tab)  1 tab  QAM


 PO  2/26/18 08:00


 3/28/18 08:59  3/7/18 07:53


1 TAB


 


 Potassium Chloride


  (Klor-Con Tab)  20 meq  BID


 PO  2/25/18 20:00


 3/27/18 20:59  3/7/18 07:53


20 MEQ


 


 Ranitidine HCl


  (zANTac TAB)  150 mg  BID


 PO  2/25/18 20:00


 3/27/18 20:59  3/7/18 07:54


150 MG


 


 Tamsulosin HCl


  (Flomax Cap)  0.4 mg  DAILY


 PO  2/26/18 08:00


 3/28/18 08:59  3/7/18 07:52


0.4 MG


 


 Ferrous Sulfate


  (Feosol Tab)  325 mg  BIDM


 PO  2/25/18 17:00


 3/27/18 17:59  3/7/18 07:51


325 MG


 


 Oxycodone/


 Acetaminophen


  (Percocet


 5-325mg Tab)  1 tab  Q4H  PRN


 PO  2/25/18 15:45


 3/11/18 15:44  3/6/18 07:51


1 TAB


 


 Oxybutynin


 Chloride


  (Ditropan Tab)  5 mg  TID


 PO  2/27/18 14:00


 3/29/18 13:59  3/7/18 07:50


5 MG


 


 Docusate Sodium


  (coLACE CAP)  100 mg  DAILY  PRN


 PO  3/4/18 16:30


 4/3/18 16:29   


 


 


 Cefazolin Sodium


 1000 mg/Syringe  7.5 ml @ 


 100 mls/hr  Q12H


 IV  3/6/18 17:00


 3/20/18 16:59  3/7/18 05:02


100 MLS/HR


 


 Prednisone


  (PredniSONE TAB)  60 mg  DAILY


 PO  3/7/18 08:00


 4/6/18 07:59  3/7/18 07:54


60 MG


 


 Diltiazem HCl


  (Cardizem Cd Cap)  120 mg  DAILY


 PO  3/8/18 08:00


 4/7/18 07:59   


 


 


 Metoprolol


 Tartrate


  (Lopressor Tab)  50 mg  DAILY


 PO  3/8/18 08:00


 4/7/18 07:59   


 











Impression





(1) Acute renal failure


(2) Hydronephrosis


(3) Kidney stones


(4) Pyelonephritis


(5) BPH (benign prostatic hyperplasia)


(6) Renal cyst


(7) Chronic anemia


77-year-old gentlemen with acute kidney injury with left-sided hydronephrosis 

status post  ureteral stent.  Creatinine initially improved from 4.1-2.6 and had

, had been stable for several days however started to worsen again, creatinine 

3.1 this morning.  Electrolyte acceptable.  Blood pressure well controlled.


Differentials for acute kidney injury include volume depletion with diuresis 

with net negative more than 1 liter daily, acute interstitial nephritis with 

different antibiotics including daptomycin which has been over last few days, 

possible stent dislodgement or dysfunction and recurrent hydronephrosis on the 

left side.





Recommendations





--he was seen by Dr. Estella Liao yesterday, who signed off as patient 

eventually wanted to continue with [facility].


--was started on prednisone 60 milligram p.o. daily


--renal function slightly improved, otherwise electrolyte, volume status 

acceptable remain non-oliguric


--continue to monitor renal function closely will hold off on discharge until 

creatinine below 3


--dose medications for GFR less than 30


--avoid nephrotoxic medications


--avoid IV fluid


--increase metoprolol and Cardizem





Will follow.

## 2018-03-08 VITALS
HEART RATE: 70 BPM | OXYGEN SATURATION: 97 % | TEMPERATURE: 97.88 F | DIASTOLIC BLOOD PRESSURE: 89 MMHG | SYSTOLIC BLOOD PRESSURE: 176 MMHG

## 2018-03-08 VITALS
DIASTOLIC BLOOD PRESSURE: 80 MMHG | TEMPERATURE: 97.34 F | OXYGEN SATURATION: 98 % | HEART RATE: 52 BPM | SYSTOLIC BLOOD PRESSURE: 182 MMHG

## 2018-03-08 VITALS
DIASTOLIC BLOOD PRESSURE: 99 MMHG | HEART RATE: 61 BPM | TEMPERATURE: 97.52 F | OXYGEN SATURATION: 96 % | SYSTOLIC BLOOD PRESSURE: 198 MMHG

## 2018-03-08 VITALS
DIASTOLIC BLOOD PRESSURE: 80 MMHG | OXYGEN SATURATION: 98 % | SYSTOLIC BLOOD PRESSURE: 182 MMHG | HEART RATE: 52 BPM | TEMPERATURE: 97.34 F

## 2018-03-08 VITALS
HEART RATE: 106 BPM | SYSTOLIC BLOOD PRESSURE: 181 MMHG | DIASTOLIC BLOOD PRESSURE: 91 MMHG | TEMPERATURE: 97.7 F | OXYGEN SATURATION: 96 %

## 2018-03-08 LAB
BASOPHILS # BLD: 0 K/UL (ref 0–0.2)
BASOPHILS NFR BLD: 0 %
BUN SERPL-MCNC: 36 MG/DL (ref 7–18)
CALCIUM SERPL-MCNC: 9.2 MG/DL (ref 8.5–10.1)
CO2 SERPL-SCNC: 22 MMOL/L (ref 21–32)
CREAT SERPL-MCNC: 2.26 MG/DL (ref 0.6–1.4)
EOS ABS #: 0.01 K/UL (ref 0–0.5)
EOSINOPHIL NFR BLD AUTO: 222 K/UL (ref 130–400)
GLUCOSE SERPL-MCNC: 124 MG/DL (ref 70–99)
HCT VFR BLD CALC: 29.1 % (ref 42–52)
HGB BLD-MCNC: 9.3 G/DL (ref 14–18)
IG#: 0.05 K/UL (ref 0–0.02)
IMM GRANULOCYTES NFR BLD AUTO: 9.1 %
LYMPHOCYTES # BLD: 1.02 K/UL (ref 1.2–3.4)
MCH RBC QN AUTO: 27 PG (ref 25–34)
MCHC RBC AUTO-ENTMCNC: 32 G/DL (ref 32–36)
MCV RBC AUTO: 84.6 FL (ref 80–100)
MONO ABS #: 0.79 K/UL (ref 0.11–0.59)
MONOCYTES NFR BLD: 7.1 %
NEUT ABS #: 9.32 K/UL (ref 1.4–6.5)
NEUTROPHILS # BLD AUTO: 0.1 %
NEUTROPHILS NFR BLD AUTO: 83.3 %
PMV BLD AUTO: 9.6 FL (ref 7.4–10.4)
POTASSIUM SERPL-SCNC: 4 MMOL/L (ref 3.5–5.1)
RED CELL DISTRIBUTION WIDTH CV: 21.8 % (ref 11.5–14.5)
RED CELL DISTRIBUTION WIDTH SD: 67.9 FL (ref 36.4–46.3)
SODIUM SERPL-SCNC: 138 MMOL/L (ref 136–145)
WBC # BLD AUTO: 11.19 K/UL (ref 4.8–10.8)

## 2018-03-08 RX ADMIN — CEFAZOLIN SCH MLS/HR: 10 INJECTION, POWDER, FOR SOLUTION INTRAVENOUS at 05:20

## 2018-03-08 RX ADMIN — FERROUS SULFATE TAB EC 325 MG (65 MG FE EQUIVALENT) SCH MG: 325 (65 FE) TABLET DELAYED RESPONSE at 16:13

## 2018-03-08 RX ADMIN — OXYBUTYNIN CHLORIDE SCH MG: 5 TABLET ORAL at 09:03

## 2018-03-08 RX ADMIN — TAMSULOSIN HYDROCHLORIDE SCH MG: 0.4 CAPSULE ORAL at 09:04

## 2018-03-08 RX ADMIN — Medication SCH TAB: at 09:02

## 2018-03-08 RX ADMIN — OXYBUTYNIN CHLORIDE SCH MG: 5 TABLET ORAL at 13:32

## 2018-03-08 RX ADMIN — RANITIDINE SCH MG: 150 TABLET ORAL at 09:03

## 2018-03-08 RX ADMIN — FERROUS SULFATE TAB EC 325 MG (65 MG FE EQUIVALENT) SCH MG: 325 (65 FE) TABLET DELAYED RESPONSE at 09:02

## 2018-03-08 RX ADMIN — POTASSIUM CHLORIDE SCH MEQ: 1500 TABLET, EXTENDED RELEASE ORAL at 09:03

## 2018-03-08 NOTE — DISCHARGE SUMMARY
Discharge Summary


Date of Service


Mar 6, 2018.





Discharge Summary


Admission Date:


Feb 25, 2018 at 15:33


Discharge Date:  Mar 8, 2018


Discharge Disposition:  Home


Principal Diagnosis:  Bacteremia secondary to pyelonephritis and left UVJ 

obstruction


Problems/Secondary Diagnoses:


- Left hydroureteronephrosis


- Acute kidney injury


- Left complex kidney cyst


- Anemia


- Benign prostatic hyperplasia 


- Hypertension


Procedures:


25Feb2018 - CT OF THE ABDOMEN AND PELVIS WITHOUT CONTRAST, STONE PROTOCOL


IMPRESSION:  


1. Moderate left hydroureteronephrosis with dilatation of the left ureter to the


level the distal ureter. No ureteral calculi. Moderate left perinephric


infiltration. Differential considerations for the dilatation include a recently


passed calculus, occult distal left ureteral/bladder urothelial lesion or


infectious process. Urologic consultation might be considered. A short-term


follow-up CT could be obtained to evaluate for resolution of the left


hydroureteronephrosis. No right hydronephrosis. Marked enlargement of the


prostate.


2. Punctate bilateral nephrolithiasis.


3. Numerous bilateral renal lesions are suboptimally assessed on this exam but


likely reflect cysts. 4 cm cystic left renal lesion contains thin septations


within calcification. This is probably benign but a follow-up nonemergent renal


protocol CT or MRI is recommended. 1.5 cm hypodense right renal lesion which


favors a hyperdense cyst which can be assessed on subsequent study.





26Feb2018 - Cystoscopy and left ureteral stent placement (Dr. Cm) 





26Feb2018 - RETROGRADE INCLUDES KUB (Pyelogram) 


IMPRESSION:  Intraprocedural radiographs demonstrating left-sided hydronephrosis


and placement of a left-sided nephroureteral stent 





26Feb2018 - CHEST 2 VIEWS ROUTINE


IMPRESSION:


1.  No acute cardiopulmonary disease.





02Mar2018 - Transthoracic echocardiogram


- Left ventricular systolic function is normal.


- Diastolic dysfunction, Grade II, consistent with elevated left atrial 

pressure.


- Right ventricular systolic pressure is elevated at 30-40mmHg.


- The inferior vena cava is mildly dilated.





05Mar2018 - (RENAL)RETROPERITON COMP (Renal ultrasound) 


IMPRESSION:


Mild/moderate left renal hydronephrosis. No definite nephrocalcinosis. Proximal


aspect of the renal stent is within the lower renal pelvis





05Mar2018 - KUB


IMPRESSION:  


1. Left ureteral stent appears to be in satisfactory positioning. No definite


nephrolithiasis or ureteral calculi identified.


2. Moderate stool volume suggests constipation.


3. Nonobstructive bowel gas pattern.





****


Consultations:


Nephrology note on 06Mar2018 


History of Present Illness


77 year old male who followed recently w/ my partner Dr Garza in CKD clinic 

in [place].  PMH includes rheumatoid arthritis, "massive" (per urology notes) 

prostatic hypertrophy, multiple complex renal cysts and chronic L sided 

hydronephrosis w/o obstructing stone or lesion (as of 8/2017 and 1/2018).





He as admitted to [facility] from 1/25-1/29 and again from 2/2-2/14.  

He had had a baseline creatinine of 0.9 - 1.2 as recently as 11/2017.  He 

presented in late January w/ creatinine 4.1 and volume depletion and was 

treated for this in [facility] from 1/25-1/29/18;  CT of his abdomen/

pelvis during that admission showed chronic L hydronephrosis which urology felt 

did not need urgent intervention; he had a alex catheter during January 

admission and PVR of 170 mL; he went home w/o a alex.  His creatinine improved 

to 2.6 by 1/29 discharge.  





On January 31, repeat labs showed increasing creatinine at 3.3, for which he 

was readmitted to [facility] from 2/2-2/14.   By admission his 

creatinine was 4.1.  The alex was replaced and he had slow improvement w/ this 

and IV fluids until 2/5-2/9 when his creatinine plateau'd at about 3.  Over 

time his serologic work up came back >> complement was negative, as was spep/bao

/ hep panel/ ANCA; cryoglobulins were very mildly but nonspecifically positive 

with negative cryocrit.  Urine eosinophils came back 2 of 3 specimens positive 

however.  He was not a renal biopsy candidate d/t L hydronephrosis on CT scan 

and sever R kidney lesions, including a hyperdense lower pole lesion, all of 

which were stable on imaging.  On 2/9, he began 60 mg daily prednisone w/ plan 

to taper over 2-3 mos to 5 mg dose.  Possible culprits for interstitial 

nephritis include leflunomide versus antibiotics given at January admission 

versus proton pump inhibitor.  At no time to my knowledge did he have F or 

rash.  He did however improve on prednisone; by 2/19 d/c, creatinine was 2.0.  

His PPI was changed to H2 blocker at d/c.  





Urology did a nuclear lasix scan suggestive of partial L upper tract 

obstruction during the February admission >> a L ureteral stent was planned for 

late that admission but then when creatinine improved, this was deferred.  Plan 

was made to f/u as outpatient.  Urology did note at inpatient eval that 

prostatectomy would likely need to be open procedure and recommended eval for 

this at tertiary care center after d/c.  The pt appears to have decided to 

transfer care to [facility] and records were released on 2/20 to this end.





The overall renal diagnosis for this pt as of 2/19/18 CKD clinic follow up was 

combined prerenal and postobstructive processes, but also acute interstitial 

nephritis which was initially responsive to steroids.  At this CKD clinic visit

, Dr. Garza lowered prednisone to 40 mg daily d/t pt restlessness/poor sleep.

  The last outpatient creatinine for this pt in this system is 2.0 on 2/22. 





He follows w/ Dr. Orlando for RA > was to see him w/in 2-4 wks of recent [facility] d

/c:  he has 4/9/18 appt.  had failed methotrexate in the past; leflunomide had 

been stopped d/t renal function.  Had been on lower dose prednisone (5 mg daily

) for RA as recently as 11/2017.





The patient was admitted here on 2/25 w/ pyelonephritis after presenting with L 

flank pain and pus in his catheter and found to have MSSA bacteremia.  He 

specifically requested care from [facility] on presentation and [facility]  His creatinine was 2.1 on presentation and trended up to 3.1 on 2/26

, then dropped as low as 2.3 3/1-3/3.  Nephrology had signed off but then 

creatinine trended up again to 3.4 today.  They have been seeing pt for the 

past 2 days as well; there is concern per their notes for interstitial 

nephritis and mulitple med changes suggested in today's note. 





His prednisone was rapidly lowered to 5 mg daily on admission > he has been on 

this dose since 2/28.  Inf s recommended treatment with 4 wks of ancef after 

initial treatment w/ daptomycin.  The patient remains on daptomycin currently 

though.  His 3/4/18 blood cultures are so far negative.  He had a L ureteral 

stent placed on 2/26.  Urology continues to plan a prostatectomy once his renal 

function is stabilized but believes that the obstructive concerns are for now 

relieved.





Assessment & Plan


78 y/o M w/ recently diagnosed interstitial nephritis, RA, chronic L 

hydronephrosis, marked prostatism, multiple BL renal lesions including complex 

R lower pole renal cyst admitted 2/25 w/ MSSA bacteremia from L pyelonephritis s

/p 2/26 stent with initial improvement in renal function but past 48 hrs 

worsening





Relapsing acute renal failure, challenging and protracted clinical situation





After 2 prolonged [facility] admissions, the impression was that he had multifactorial 

renal failure from prerenal and obstructive processes as well as acute 

interstitial nephritis, for which high dose prednisone was given with good 

response as detailed above.  Dx was empiric as well as based on 2 urine 

specimens positive for eosinophils, again as detailed above





-he had been responding to high dose steroids intensified from RA therapy to 

treat interstitial nephritis at previous hospital stay; these were abruptly 

tapered here after admission w/ potentially life threatening infection >> defer 

to primary service, to inf dzs, to nephrology whether to resume these; would 

have to be balanced w/ infection risk <> he is 48 hrs out from last blood 

culture which remains negative


-agree w/ Dr. Goodman's recommendations regarding  IV fluids as tolerated


>>>would definitely change from PPI > H2 blocker


>>>would prefer cefazolin to bactrim however (see today's renal note from [facility]) 

unless I have missed a reason for avoiding the former when/if changing from 

daptomycin 


-given his renal anatomy/lesions do not believe he is a renal biopsy candidate





****





Nephrology recommendations on 08Mar2018


--continue on prednisone 60 milligram p.o. daily


--renal function improving, electrolyte, volume status acceptable, remain non-

oliguric


--dose medications for GFR less than 30


--avoid nephrotoxic medications


-- OK to be discharge when antibiotic can be switched to orally


--repeat renal function 2/3 days after discharge and result send to Dr. Mason. Please schedule out pt visit within a week with Dr. Mason. Plan to 

continue prednisone 60 mg /d for 1 week and then decrease to 50 mg/d and 

further dose adjustment  by Dr. Mason during out pt visit. 





****





Urology recommendations on 08Mar2018


A./P: Renal failure, unilateral hydro, urinary retention


- improvement in renal function overnight


- reassured him and encouraged him to be patient


- hold on any  intervention until acute issues are resolved





****





Medication Reconciliation


New Medications:  


Cephalexin (Cephalexin) 500 Mg Tab


2 TAB PO Q6 for 3 Days, #24 TAB





Diltiazem HCl (Diltiazem Cd) 120 Mg Capcr


120 MG PO DAILY for 30 Days, #30 UNIT 0 Refills





Metoprolol Tartrate (Lopressor) (Lopressor) 50 Mg Tab


50 MG PO DAILY for 30 Days, #30 TAB 0 Refills





Oxybutynin Chloride (Oxybutynin Chloride) 5 Mg Tab


5 MG PO TID for 30 Days, #90 TAB 0 Refills





Prednisone Tab (Prednisone) 10 Mg Tab


10 MG PO DAILY for 45 Days, #129 TAB


Each dose is daily.  Taper: 60mg x 4 days, 50 mg x7d, 40mg x7d, 30mg


 x7d, 20mg x7d, 10mg x7d.


 


Continued Medications:  


Acetaminophen (Tylenol) 500 Mg Tab


1000 MG PO UD PRN for Pain, TAB





Ascorbic Acid (Ascorbic Acid) 500 Mg Tab


Unknown Dose PO BID, TAB





Aspirin (Aspirin EC Low Dose) 81 Mg Ectab


81 MG PO BID for 30 Days, #60





Docusate Sodium (Colace) 100 Mg Cap


1 CAP PO HS for 15 Days, CAP





Ferrous Sulfate (Kp Ferrous Sulfate) 325 Mg Tab


1 TAB PO BID for 30 Days, #60 TAB 3 Refills





Finasteride (Proscar) 5 Mg Tab


5 MG PO HS, TAB





Folic Acid (Folic Acid) 1 Mg Tab


1 TAB PO QAM





Multivitamin (Multivitamin)  Tab


1 TABLET PO QAM, TAB





Potassium Chloride (Micro-K Ext Rel) 10 Meq Capcr


20 MEQ PO BID, CAP





Ranitidine (Zantac) 150 Mg Tab


150 MG PO BID, TAB





Tamsulosin Hcl (Flomax) 0.4 Mg Cap


0.4 MG PO DAILY, CAP





 


Discontinued Medications:  


Metoprolol Tartrate (Lopressor) (Lopressor) 25 Mg Tab


25 MG PO DAILY, TAB





Omeprazole (Prilosec) 20 Mg Capcr


20 MG PO QAM, CAP





Prednisone (Prednisone) 20 Mg Tab


40 MG PO DAILY, TAB











Discharge Exam


General Appearance:  Awake, alert & oriented, sitting in chair, comfortable in 

general, NAD.  


CV: +S1S2 RRR.  No peripheral edema.  


Pulm: Clear to auscultation throughout.  


Abdomen:  +BS, soft, non-distended.  Mild suprapubic/LLQ tenderness, unchanged 

from prior two days.  No guarding.  


Extremities:  No pedal edema or calf tenderness.  


Neuro: No gross neuro deficits.  


Lines: Left arm PIV.  Alex cath.





****


Review of Systems:  


   Constitutional:  No fever, No chills


   Respiratory:  No cough, No shortness of breath


   Cardiovascular:  No chest pain, No edema


   Abdomen:  No nausea, No vomiting, No diarrhea





Hospital Course


HPI at time of admission on Feb 25, 2018 at 15:27





76 yo male PMH: Rheumatoid arthritis on prednisone, BPH, Anemia of chronic 

disease, HTN, HLD, primary OA, presents to ED with 12 hour hx of Left-sided CVA 

pain and suprapubic pain on top of a 3 day hx of increasing sediment, pus, and 

orange color of urine in his indwelling catheter. He was recently admitted to 

[facility] from 2/2-14/18 for LINDA in setting of urinary retention 2/2 

worsening BPH.  During that admission he was found to have left sided 

hydroureteronephrosis. Was treated for complicated UTI with rocephin x 5 days. 

He had been seen by urology who had discussed possibility of inserting a stent 

but as he clinically improved, they decided to opt for outpatient follow up, 

which has not occurred yet. He has been afebrile, no chills/nausea/vomiting/

chest pain/dyspnea





************





Discharge summary on 08Mar2018 





76 yo male admitted on 25 feb 2018 for bacteremia secondary to pyelonephritis, 

left VUJ obstruction, and left hydroureteronephrosis.  


PMH: RA on prednisone, BPH, anemia of chronic disease, HTN, HLD, primary OA.  

Recent admit to [facility] from 02-14Feb2018 for LINDA & UTI in setting of urinary 

retention from BPH.  





Bacteremia secondary to to pyelonephritis, left VUJ obstruction, and left 

hydroureteronephrosis:  01Mar BCx positive for MSSA.  Follow on 03Mar BCx 

negative to date and 04Mar BCx negative to date.  25Feb UCx positive for staph, 

follow on 03Mar UCx no growth.  02Mar Echo not suggestive of endocarditis.  ID 

onboard.  Had been on daptomycin for nine days.  06Mar switched to cefazolin 1 

gram IV q12h (renal dosing) per prior ID recommendations.  On day of discharge, 

spoke with ID, and they agreed since only three more days of antibiotics needed 

to stop IV form and convert to keflex.  Prescription for same given.  





Left hydronephrosis: S/p stent placement by urology on 26Feb.  05Mar renal u/s 

notes some mild/moderate residual hydronephrosis which has not progressed.  

05Mar KUB notes appropriate stent position.  Percocet prn as inpatient for pain 

management.  Urology spoke with patient, provided reassurance and recommended 

he follow nephrology team recommendations.  Still planning future TURP and 

possible stent removal.  Home with alex and planned urology f/u per their 

instructions.  





Acute kidney injury:  Cr max 3.4, improved.  Nephrology and urology onboard.  

Urology did not believe this is due to urinary retention.  Nephrology believes 

is related to acute interstitial nephritis.  See their notes for thorough 

discussion of recent events.  Was placed back on steroids 06Mar evening with 

planned prednisone 60 mg daily with 10 mg per week taper.  Stopped his PPI.  


- Wrote paper prescription so patient can get BMP prior to planned nephrology 

follow up with Dr. Mason early the week after discharge.  





Left complex kidney cyst: As noted on 25Feb CT a/p.  Recommended follow-up for 

same.  





Anemia: Likely of chronic disease related to RA, worsened by hematuria.  S/p 

transfusion on 28Feb and 04Mar (two units PRBCs total).  Presently stable at Hb 

9.3.  FOBT negative.  On iron supplementation.  





BPH: Chronic x 15 years, worsening.  On flomax, proscar, ditropan TID.  





Rheumatoid arthritis: On folic acid.  Off his home leflunomide (secondary to 

recent weight loss) and baseline prednisone 5 mg.  





HTN: Holding ASA 81 mg given low Hb & hematuria.  Increased his metoprolol to 

50 mg daily and his diltiazem ER to 120 mg daily to help with blood pressure 

control.  





Resident Physician Supervision Note:





I interviewed and examined the patient. Discussed with Dr. Prieto and agree 

with findings and plan as documented in the note. Any exceptions or 

clarifications are listed here: None








This patient is improved the addition of steroids his creatinine is come down 

now into the 2 range he feels markedly improved also we discussed his long-term 

antibiotic use with infectious disease in the of agreed to go to with oral 

antibiotics which will negate the need for any type of outpatient infusion this 

will be for additional 3 days complete a 14 day regimen of antibiotic care 

patient did voice a understanding by repeating back is the need for his 

steroids continued higher doses and taper as listed in his discharge summary he 

also is willing to follow-up with Dr. Mamadou Mason in about any physician 

group nephrology for his likely interstitial nephritis we have also listed PPIs 

on his allergy list of this may be implicated in his disease


Vital signs show temperature 36 5 pulse 106 respiration rate 20 BP slightly 

elevated 181/91


His current exam is regular lungs are clear extremities without edema





This patient initially presented with obstructive uropathy status post stenting 

a concurrent pyelonephritis and then considered interstitial nephritis likely 

due to medications all of improved he has had stenting of his left ureter he 

will be discharged with a Alex catheter and leg bag for follow-up with urology 

for possible TURP and he will follow up with nephrology for his likely 

interstitial nephritis with a long-term tapering dose of prednisone therapy


Documented By:  Anthony Raza








***


Total Time Spent:  Greater than 30 minutes


This includes examination of the patient, discharge planning, medication 

reconciliation, and communication with other providers.





Discharge Instructions


Please refer to the electronic Patient Visit Report (Discharge Instructions) 

for additional information.





Additional Copies To


Mamadou Mason M.D.; Arnaldo Trinidad M.D.; Sukhjinder Cm M.D.





Resident Tracking


Resident Involvement:  Resident Care Provided


Care Provided:  Adult Hospital Medicine (inpatient)

## 2018-03-08 NOTE — DISCHARGE INSTRUCTIONS
Discharge Instructions


Date of Service


Mar 8, 2018.





Admission


Reason for Admission:  Cva Tenderness, Suprapubic Pain, Acute





Discharge


Discharge Diagnosis / Problem:  Bacteremia secondary to pyelonephritis, left 

UVJ obstruction





Discharge Goals


Goal(s):  Increase independence, Improve disease control, Learn about illness





Activity Recommendations


Activity Limitations:  resume your previous activity





.





Instructions / Follow-Up


Instructions / Follow-Up


You were admitted to the hospital for bacteremia (a bacterial infection in your 

blood) caused by a kidney infection linked to an obstruction of the ability of 

your urine to properly drain.  This has been treated by a course of antibiotics 

as well as by placement of a left ureteral stent (with plans for an outpatient 

TURP by the urologists in the future).  You were also seen by nephrology (the 

kidney doctors) due to a rising level of creatinine in your blood.  This 

improved once you were started on a higher dose of steroids.  





- You will go home with your alex catheter.  Please follow up with urology (

Dr. Cm) per their recommendations.





- You need to get repeat blood work on Saturday, March 10th (if available from 

your local clinic/hospital), or Monday at the latest.  A physical prescription 

for this lab work was provided to you.  Please discuss the results of the lab 

work with Dr. Mason (nephrologist) in about a week.  Please call their office 

to schedule this appointment.  





- You have a known history of anemia which was stable in the hospital.  Part of 

your lab work will check to make sure it remains stable in the initial couple 

of days after hospital discharge.  Please discuss this with either the 

nephrologist or your primary care provider.  





- You are going home on the following new medications: 


1) Increased dose of metoprolol (blood pressure). 


2) Increased dose of diltiazem (blood pressure). 


3) Prednisone taper: All pills are 10 mg.  You only take a single dose per day.

  You were started on a 60 mg dose in the hospital, so starting on Friday 08Mar 

take 60 mg daily for the next four days.  Then decrease by 10 mg every week, 

such that you take 50 mg daily for 7 days, 40 mg daily for 7 days, etc.  Your 

nephrologist may modify this regimen at your appointment with them. 


4) Keflex every six hours for the next three days, to complete a total 14 day 

antibiotic course. 


5) Oxybutynin, a new medication that may help with bladder symptoms. 


**) Please stop taking the prilosec.  





- Finally, please follow up with your primary care provider asap for close post-

hospital continuity of care and ongoing management of your health.  





- Return to the emergency department if you have any fevers, uncontrolled pain, 

notice blood in your alex bag, or for any other acute concerns.





Current Hospital Diet


Patient's current hospital diet: Regular Diet





Discharge Diet


Recommended Diet:  Regular Diet





Procedures


Procedures Performed:  


- Cystoscopy, Left Ureteral Stent Insertion ([id number]).


- CT scan of your abdomen and pelvis


- Retrograde Pyelogram (after stent placement)


- Renal ultrasound





Pending Studies


Studies pending at discharge:  yes


List of pending studies:  


Final results of blood cultures on March 3rd and 4th.





Medical Emergencies








.


Who to Call and When:





Medical Emergencies:  If at any time you feel your situation is an emergency, 

please call 911 immediately.





.





Non-Emergent Contact


Non-Emergency issues call your:  Primary Care Provider





.

## 2018-03-08 NOTE — FAMILY MEDICINE PROGRESS NOTE
Progress Note


Date of Service


Mar 8, 2018.





Medications





Current Inpatient Medications








 Medications


  (Trade)  Dose


 Ordered  Sig/Juaquin


 Route  Start Time


 Stop Time Status Last Admin


Dose Admin


 


 Acetaminophen


  (Tylenol Tab)  650 mg  Q4H  PRN


 PO  2/25/18 15:45


 3/27/18 15:44  3/8/18 06:34


650 MG


 


 Al Hydrox/Mg


 Hydrox/Simethicone


  (Maalox Max Susp)  15 ml  Q4H  PRN


 PO  2/25/18 15:45


 3/27/18 15:44   


 


 


 Magnesium


 Hydroxide


  (Milk Of


 Magnesia Susp)  30 ml  Q6H  PRN


 PO  2/25/18 15:45


 3/27/18 15:44  3/5/18 16:23


30 ML


 


 Polyethylene


  (Miralax Powder


 Packet)  17 gm  DAILY  PRN


 PO  2/25/18 15:45


 3/27/18 15:44  3/5/18 05:41


17 GM


 


 Ondansetron HCl


  (Zofran Inj)  4 mg  Q6H  PRN


 IV  2/25/18 15:45


 3/27/18 15:44   


 


 


 Aspirin


  (Ecotrin Tab)  81 mg  BID


 PO  2/25/18 20:00


 3/27/18 20:59 Future Hold 2/28/18 08:09


81 MG


 


 Finasteride


  (Proscar Tab)  5 mg  HS


 PO  2/25/18 21:00


 3/27/18 20:59  3/7/18 20:53


5 MG


 


 Folic Acid


  (Folvite Tab)  1 mg  QAM


 PO  2/26/18 08:00


 3/28/18 08:59  3/7/18 07:52


1 MG


 


 Multivitamins


  (Multivitamin


 Tab)  1 tab  QAM


 PO  2/26/18 08:00


 3/28/18 08:59  3/7/18 07:53


1 TAB


 


 Potassium Chloride


  (Klor-Con Tab)  20 meq  BID


 PO  2/25/18 20:00


 3/27/18 20:59  3/7/18 20:54


20 MEQ


 


 Ranitidine HCl


  (zANTac TAB)  150 mg  BID


 PO  2/25/18 20:00


 3/27/18 20:59  3/7/18 20:54


150 MG


 


 Tamsulosin HCl


  (Flomax Cap)  0.4 mg  DAILY


 PO  2/26/18 08:00


 3/28/18 08:59  3/7/18 07:52


0.4 MG


 


 Ferrous Sulfate


  (Feosol Tab)  325 mg  BIDM


 PO  2/25/18 17:00


 3/27/18 17:59  3/7/18 16:37


325 MG


 


 Oxycodone/


 Acetaminophen


  (Percocet


 5-325mg Tab)  1 tab  Q4H  PRN


 PO  2/25/18 15:45


 3/11/18 15:44  3/6/18 07:51


1 TAB


 


 Oxybutynin


 Chloride


  (Ditropan Tab)  5 mg  TID


 PO  2/27/18 14:00


 3/29/18 13:59  3/7/18 20:53


5 MG


 


 Docusate Sodium


  (coLACE CAP)  100 mg  DAILY  PRN


 PO  3/4/18 16:30


 4/3/18 16:29   


 


 


 Cefazolin Sodium


 1000 mg/Syringe  7.5 ml @ 


 100 mls/hr  Q12H


 IV  3/6/18 17:00


 3/20/18 16:59  3/8/18 05:20


100 MLS/HR


 


 Prednisone


  (PredniSONE TAB)  60 mg  DAILY


 PO  3/7/18 08:00


 4/6/18 07:59  3/7/18 07:54


60 MG


 


 Diltiazem HCl


  (Cardizem Cd Cap)  120 mg  DAILY


 PO  3/8/18 08:00


 4/7/18 07:59   


 


 


 Metoprolol


 Tartrate


  (Lopressor Tab)  50 mg  DAILY


 PO  3/8/18 08:00


 4/7/18 07:59   


 











Objective


Vital Signs











  Date Time  Temp Pulse Resp B/P (MAP) Pulse Ox O2 Delivery O2 Flow Rate FiO2


 


3/8/18 07:53 36.5 106 20 181/91 (121) 96 Room Air  


 


3/8/18 04:18 36.6 70 20 176/89 (118) 97 Room Air  


 


3/8/18 00:15      Room Air  


 


3/8/18 00:00 36.4 61 20 198/99 (132) 96 Room Air  


 


3/7/18 16:20      Room Air  


 


3/7/18 14:28 36.6 64 17 193/89 (123) 97   


 


3/7/18 11:43    174/76 (108)    











Laboratory Results


3/8/18 05:16








Red Blood Count 3.44, Mean Corpuscular Volume 84.6, Mean Corpuscular Hemoglobin 

27.0, Mean Corpuscular Hemoglobin Concent 32.0, Mean Platelet Volume 9.6, 

Neutrophils (%) (Auto) 83.3, Lymphocytes (%) (Auto) 9.1, Monocytes (%) (Auto) 

7.1, Eosinophils (%) (Auto) 0.1, Basophils (%) (Auto) 0.0, Neutrophils # (Auto) 

9.32, Lymphocytes # (Auto) 1.02, Monocytes # (Auto) 0.79, Eosinophils # (Auto) 

0.01, Basophils # (Auto) 0.00





3/8/18 05:16

















Test


  3/7/18


20:20 3/8/18


05:16


 


Bedside Glucose


  209 mg/dl


(70-99) 


 


 


White Blood Count


  


  11.19 K/uL


(4.8-10.8)


 


Red Blood Count


  


  3.44 M/uL


(4.7-6.1)


 


Hemoglobin


  


  9.3 g/dL


(14.0-18.0)


 


Hematocrit  29.1 % (42-52) 


 


Mean Corpuscular Volume


  


  84.6 fL


()


 


Mean Corpuscular Hemoglobin


  


  27.0 pg


(25-34)


 


Mean Corpuscular Hemoglobin


Concent 


  32.0 g/dl


(32-36)


 


Platelet Count


  


  222 K/uL


(130-400)


 


Mean Platelet Volume


  


  9.6 fL


(7.4-10.4)


 


Neutrophils (%) (Auto)  83.3 % 


 


Lymphocytes (%) (Auto)  9.1 % 


 


Monocytes (%) (Auto)  7.1 % 


 


Eosinophils (%) (Auto)  0.1 % 


 


Basophils (%) (Auto)  0.0 % 


 


Neutrophils # (Auto)


  


  9.32 K/uL


(1.4-6.5)


 


Lymphocytes # (Auto)


  


  1.02 K/uL


(1.2-3.4)


 


Monocytes # (Auto)


  


  0.79 K/uL


(0.11-0.59)


 


Eosinophils # (Auto)


  


  0.01 K/uL


(0-0.5)


 


Basophils # (Auto)


  


  0.00 K/uL


(0-0.2)


 


RDW Standard Deviation


  


  67.9 fL


(36.4-46.3)


 


RDW Coefficient of Variation


  


  21.8 %


(11.5-14.5)


 


Immature Granulocyte % (Auto)  0.4 % 


 


Immature Granulocyte # (Auto)


  


  0.05 K/uL


(0.00-0.02)


 


Anisocytosis  PRESENT 


 


Anion Gap


  


  9.0 mmol/L


(3-11)


 


Est Creatinine Clear Calc


Drug Dose 


  23.8 ml/min 


 


 


Estimated GFR (


American) 


  31.3 


 


 


Estimated GFR (Non-


American 


  27.0 


 


 


BUN/Creatinine Ratio  16.1 (10-20) 


 


Calcium Level


  


  9.2 mg/dl


(8.5-10.1)











Resident Tracking


Resident Involvement:  Resident Care Provided


Care Provided:  Adult Hospital Medicine (inpatient)

## 2018-03-08 NOTE — NEPHROLOGY PROGRESS NOTE
Nephrology Progress Note


Date of Service


Mar 8, 2018.





Chief Complaint


Follow-up for acute kidney injury.





Subjective


Rickie was seen and examined in his room this morning.  He otherwise feels well, 

denies any chest pain or shortness of Breath, appetite has been decent.  Blood 

pressure running high.  Urine output decent, net negative.  Creatinine  

improved to 2.3 today, electrolyte acceptable.





Review of Systems


A complete review of systems was performed.  Pertinent positives are noted 

above. All other systems are negative.





Vital Signs





Last 8 Hrs








  Date Time  Temp Pulse Resp B/P (MAP) Pulse Ox O2 Delivery O2 Flow Rate FiO2


 


3/8/18 07:53 36.5 106 20 181/91 (121) 96 Room Air  


 


3/8/18 04:18 36.6 70 20 176/89 (118) 97 Room Air  











Last Recorded Weight


Weight (Kilograms):  65.700





Physical Exam


GENERAL: Elderly male, AAA x 3, not in any distress.


NECK: Supple, no JVD.


RESPIRATORY: Normal breathing efforts, no accessory muscle use, clear to 

auscultation bilaterally, no wheezes or rales.


CARDIOVASCULAR: S1, S2 normal, rate rhythm regular.


EXTREMITY: No lower extremity edema


NEURO: speech fluent.


PSYCHIATRY: Normal mood and judgment





Family History





FH: CAD (coronary artery disease)


  BROTHER





Negative for CKD / ESRD





Social History


Smoking Status:  Former smoker


Marital Status:  


Housing Status:  lives with family


Occupation:  retired





.  Retired.  Formerly worked at [employer], later owned a 

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





Laboratory Results


Past 24 Hours


3/8/18 05:16








Red Blood Count 3.44, Mean Corpuscular Volume 84.6, Mean Corpuscular Hemoglobin 

27.0, Mean Corpuscular Hemoglobin Concent 32.0, Mean Platelet Volume 9.6, 

Neutrophils (%) (Auto) 83.3, Lymphocytes (%) (Auto) 9.1, Monocytes (%) (Auto) 

7.1, Eosinophils (%) (Auto) 0.1, Basophils (%) (Auto) 0.0, Neutrophils # (Auto) 

9.32, Lymphocytes # (Auto) 1.02, Monocytes # (Auto) 0.79, Eosinophils # (Auto) 

0.01, Basophils # (Auto) 0.00





3/8/18 05:16

















Test


  3/7/18


17:01 3/7/18


20:20 3/8/18


05:16 3/8/18


08:02


 


Bedside Glucose


  298 mg/dl


(70-99) 209 mg/dl


(70-99) 


  139 mg/dl


(70-99)


 


White Blood Count


  


  


  11.19 K/uL


(4.8-10.8) 


 


 


Red Blood Count


  


  


  3.44 M/uL


(4.7-6.1) 


 


 


Hemoglobin


  


  


  9.3 g/dL


(14.0-18.0) 


 


 


Hematocrit   29.1 % (42-52)  


 


Mean Corpuscular Volume


  


  


  84.6 fL


() 


 


 


Mean Corpuscular Hemoglobin


  


  


  27.0 pg


(25-34) 


 


 


Mean Corpuscular Hemoglobin


Concent 


  


  32.0 g/dl


(32-36) 


 


 


Platelet Count


  


  


  222 K/uL


(130-400) 


 


 


Mean Platelet Volume


  


  


  9.6 fL


(7.4-10.4) 


 


 


Neutrophils (%) (Auto)   83.3 %  


 


Lymphocytes (%) (Auto)   9.1 %  


 


Monocytes (%) (Auto)   7.1 %  


 


Eosinophils (%) (Auto)   0.1 %  


 


Basophils (%) (Auto)   0.0 %  


 


Neutrophils # (Auto)


  


  


  9.32 K/uL


(1.4-6.5) 


 


 


Lymphocytes # (Auto)


  


  


  1.02 K/uL


(1.2-3.4) 


 


 


Monocytes # (Auto)


  


  


  0.79 K/uL


(0.11-0.59) 


 


 


Eosinophils # (Auto)


  


  


  0.01 K/uL


(0-0.5) 


 


 


Basophils # (Auto)


  


  


  0.00 K/uL


(0-0.2) 


 


 


RDW Standard Deviation


  


  


  67.9 fL


(36.4-46.3) 


 


 


RDW Coefficient of Variation


  


  


  21.8 %


(11.5-14.5) 


 


 


Immature Granulocyte % (Auto)   0.4 %  


 


Immature Granulocyte # (Auto)


  


  


  0.05 K/uL


(0.00-0.02) 


 


 


Anisocytosis   PRESENT  


 


Anion Gap


  


  


  9.0 mmol/L


(3-11) 


 


 


Est Creatinine Clear Calc


Drug Dose 


  


  23.8 ml/min 


  


 


 


Estimated GFR (


American) 


  


  31.3 


  


 


 


Estimated GFR (Non-


American 


  


  27.0 


  


 


 


BUN/Creatinine Ratio   16.1 (10-20)  


 


Calcium Level


  


  


  9.2 mg/dl


(8.5-10.1) 


 











Allergies


Coded Allergies:  


     Lisinopril (Verified  Adverse Reaction, Mild, COUGH, 12/13/17)


Uncoded Allergies:  


     ppi (Adverse Reaction, Severe, worsened Cr, 3/8/18)


 may be associated with interstitial neprhitis





Medications





Current Inpatient Medications








 Medications


  (Trade)  Dose


 Ordered  Sig/Juaquin


 Route  Start Time


 Stop Time Status Last Admin


Dose Admin


 


 Acetaminophen


  (Tylenol Tab)  650 mg  Q4H  PRN


 PO  2/25/18 15:45


 3/27/18 15:44  3/8/18 06:34


650 MG


 


 Al Hydrox/Mg


 Hydrox/Simethicone


  (Maalox Max Susp)  15 ml  Q4H  PRN


 PO  2/25/18 15:45


 3/27/18 15:44   


 


 


 Magnesium


 Hydroxide


  (Milk Of


 Magnesia Susp)  30 ml  Q6H  PRN


 PO  2/25/18 15:45


 3/27/18 15:44  3/5/18 16:23


30 ML


 


 Polyethylene


  (Miralax Powder


 Packet)  17 gm  DAILY  PRN


 PO  2/25/18 15:45


 3/27/18 15:44  3/5/18 05:41


17 GM


 


 Ondansetron HCl


  (Zofran Inj)  4 mg  Q6H  PRN


 IV  2/25/18 15:45


 3/27/18 15:44   


 


 


 Aspirin


  (Ecotrin Tab)  81 mg  BID


 PO  2/25/18 20:00


 3/27/18 20:59 Future Hold 2/28/18 08:09


81 MG


 


 Finasteride


  (Proscar Tab)  5 mg  HS


 PO  2/25/18 21:00


 3/27/18 20:59  3/7/18 20:53


5 MG


 


 Folic Acid


  (Folvite Tab)  1 mg  QAM


 PO  2/26/18 08:00


 3/28/18 08:59  3/8/18 09:03


1 MG


 


 Multivitamins


  (Multivitamin


 Tab)  1 tab  QAM


 PO  2/26/18 08:00


 3/28/18 08:59  3/8/18 09:02


1 TAB


 


 Potassium Chloride


  (Klor-Con Tab)  20 meq  BID


 PO  2/25/18 20:00


 3/27/18 20:59  3/8/18 09:03


20 MEQ


 


 Ranitidine HCl


  (zANTac TAB)  150 mg  BID


 PO  2/25/18 20:00


 3/27/18 20:59  3/8/18 09:03


150 MG


 


 Tamsulosin HCl


  (Flomax Cap)  0.4 mg  DAILY


 PO  2/26/18 08:00


 3/28/18 08:59  3/8/18 09:04


0.4 MG


 


 Ferrous Sulfate


  (Feosol Tab)  325 mg  BIDM


 PO  2/25/18 17:00


 3/27/18 17:59  3/8/18 09:02


325 MG


 


 Oxycodone/


 Acetaminophen


  (Percocet


 5-325mg Tab)  1 tab  Q4H  PRN


 PO  2/25/18 15:45


 3/11/18 15:44  3/6/18 07:51


1 TAB


 


 Oxybutynin


 Chloride


  (Ditropan Tab)  5 mg  TID


 PO  2/27/18 14:00


 3/29/18 13:59  3/8/18 09:03


5 MG


 


 Docusate Sodium


  (coLACE CAP)  100 mg  DAILY  PRN


 PO  3/4/18 16:30


 4/3/18 16:29   


 


 


 Cefazolin Sodium


 1000 mg/Syringe  7.5 ml @ 


 100 mls/hr  Q12H


 IV  3/6/18 17:00


 3/20/18 16:59  3/8/18 05:20


100 MLS/HR


 


 Prednisone


  (PredniSONE TAB)  60 mg  DAILY


 PO  3/7/18 08:00


 4/6/18 07:59  3/8/18 09:02


60 MG


 


 Diltiazem HCl


  (Cardizem Cd Cap)  120 mg  DAILY


 PO  3/8/18 08:00


 4/7/18 07:59  3/8/18 09:04


120 MG


 


 Metoprolol


 Tartrate


  (Lopressor Tab)  50 mg  DAILY


 PO  3/8/18 08:00


 4/7/18 07:59  3/8/18 09:03


50 MG











Impression





(1) Acute renal failure


(2) Hydronephrosis


(3) Kidney stones


(4) Pyelonephritis


(5) BPH (benign prostatic hyperplasia)


(6) Renal cyst


(7) Chronic anemia


77-year-old gentlemen with acute kidney injury with left-sided hydronephrosis 

status post  ureteral stent.  Creatinine initially improved from 4.1-2.6 and had

, had been stable for several days however started to worsen again, creatinine 

3.1 this morning.  Electrolyte acceptable.  Blood pressure well controlled.


Differentials for acute kidney injury include volume depletion with diuresis 

with net negative more than 1 liter daily, acute interstitial nephritis with 

different antibiotics including daptomycin which has been over last few days, 

possible stent dislodgement or dysfunction and recurrent hydronephrosis on the 

left side.





Recommendations





--continue on prednisone 60 milligram p.o. daily


--renal function improving, electrolyte, volume status acceptable, remain non-

oliguric


--dose medications for GFR less than 30


--avoid nephrotoxic medications


-- OK to be discharge when antibiotic can be switched to orally


--repeat renal function 2/3 days after discharge and result send to Dr. Mason. Please schedule out pt visit within a week with Dr. Mason. Plan to 

continue prednisone 60 mg /d for 1 week and then decrease to 50 mg/d and 

further dose adjustment  by Dr. Mason during out pt visit. 





Will follow while in patient.

## 2018-03-12 NOTE — EDITING REQUIRED CODING QUERY
SEPSIS



To promote full compliance with coding requirements relating to patient care, physician 
participation is requested in all cases of  uncertainty.  Please assist us with the 
question(s) below:



In responding to this query, please exercise your independent professional judgement.  The 
fact that a question is asked does not imply that any particular answer is desired or 
expected.  We appreciate your clarification on this issue.



Throughout the medical record, you have clearly documented a localized infection and your 
patient has clinical evidence of a generalized sepsis or severe sepsis.  The term 
urosepsis is a nonspecific entity and is coded as an UTI.  If the patient has sepsis, 
severe sepsis, from an urinary source or some other source, please clarify in your 
response below.



The medical record reflects the following clinical findings:  Patient admitted with 
bacteremia/MSSA in the setting of pyelonephritis and acute renal failure. Progress notes 
document sepsis- D/S - documents bacteremia. Please select below the diagnosis that was 
treated during this inpatient encounter.  Thank you! David Patterson [facility]  



____________________________________________________________________________________



( )Bacteremia (Nonspecific laboratory finding of bacteria in the blood)

    Specify Organism      

    ( ) Present on Admission    ( ) Not present on admission    ( ) Unable to clinically 
determine



( x) Septicemia (Systemic disease associated with the presence of pathogenic 
microorganisms in the blood):    Specify Organism       

    (x ) Present on Admission    ( ) Not present on admission    ( ) Unable to clinically 
determine



( ) Sepsis 

    Specify Organism      

    Specify Associated Condition/Diagnosis       

    ( ) Present on Admission    ( ) Not present on admission    ( ) Unable to clinically 
determine



( ) Severe Sepsis (Sepsis associated with acute organ dysfunction)

    Specify Organism      

    Specify Associated Condition/Diagnosis      

    ( ) Present on Admission    ( ) Not present on admission    ( ) Unable to clinically 
determine



( ) Septic Shock (Severe sepsis with acute circulatory failure, unexplained by other 
causes)

    ( ) Present on Admission    ( ) Not present on admission    ( ) Unable to clinically 
determine



( ) Other, patient has:

## 2018-03-14 ENCOUNTER — HOSPITAL ENCOUNTER (OUTPATIENT)
Dept: HOSPITAL 45 - C.LABMFLN | Age: 78
Discharge: HOME | End: 2018-03-14
Attending: FAMILY MEDICINE
Payer: COMMERCIAL

## 2018-03-14 DIAGNOSIS — I10: Primary | ICD-10-CM

## 2018-03-14 DIAGNOSIS — N17.9: ICD-10-CM

## 2018-03-14 LAB
ALBUMIN SERPL-MCNC: 3.3 GM/DL (ref 3.4–5)
ALP SERPL-CCNC: 69 U/L (ref 45–117)
ALT SERPL-CCNC: 27 U/L (ref 12–78)
AST SERPL-CCNC: 16 U/L (ref 15–37)
BASOPHILS # BLD: 0.02 K/UL (ref 0–0.2)
BASOPHILS NFR BLD: 0.2 %
BUN SERPL-MCNC: 48 MG/DL (ref 7–18)
CALCIUM SERPL-MCNC: 9.2 MG/DL (ref 8.5–10.1)
CO2 SERPL-SCNC: 25 MMOL/L (ref 21–32)
CREAT SERPL-MCNC: 1.95 MG/DL (ref 0.6–1.4)
EOS ABS #: 0.01 K/UL (ref 0–0.5)
EOSINOPHIL NFR BLD AUTO: 302 K/UL (ref 130–400)
GLUCOSE SERPL-MCNC: 166 MG/DL (ref 70–99)
HCT VFR BLD CALC: 33.6 % (ref 42–52)
HGB BLD-MCNC: 10.7 G/DL (ref 14–18)
IG#: 0.5 K/UL (ref 0–0.02)
IMM GRANULOCYTES NFR BLD AUTO: 7.2 %
LYMPHOCYTES # BLD: 0.95 K/UL (ref 1.2–3.4)
MCH RBC QN AUTO: 27.5 PG (ref 25–34)
MCHC RBC AUTO-ENTMCNC: 31.8 G/DL (ref 32–36)
MCV RBC AUTO: 86.4 FL (ref 80–100)
MONO ABS #: 0.33 K/UL (ref 0.11–0.59)
MONOCYTES NFR BLD: 2.5 %
NEUT ABS #: 11.42 K/UL (ref 1.4–6.5)
NEUTROPHILS # BLD AUTO: 0.1 %
NEUTROPHILS NFR BLD AUTO: 86.2 %
PMV BLD AUTO: 10 FL (ref 7.4–10.4)
POTASSIUM SERPL-SCNC: 4.2 MMOL/L (ref 3.5–5.1)
PROT SERPL-MCNC: 7.5 GM/DL (ref 6.4–8.2)
RED CELL DISTRIBUTION WIDTH CV: 22 % (ref 11.5–14.5)
RED CELL DISTRIBUTION WIDTH SD: 70.3 FL (ref 36.4–46.3)
SODIUM SERPL-SCNC: 137 MMOL/L (ref 136–145)
TRANSFERRIN SERPL-MCNC: 152 MG/DL (ref 200–360)
WBC # BLD AUTO: 13.23 K/UL (ref 4.8–10.8)

## 2018-03-15 ENCOUNTER — HOSPITAL ENCOUNTER (OUTPATIENT)
Dept: HOSPITAL 45 - C.LABMFLN | Age: 78
Discharge: HOME | End: 2018-03-15
Attending: INTERNAL MEDICINE
Payer: COMMERCIAL

## 2018-03-15 DIAGNOSIS — N17.9: ICD-10-CM

## 2018-03-15 DIAGNOSIS — I10: Primary | ICD-10-CM

## 2018-04-04 ENCOUNTER — HOSPITAL ENCOUNTER (EMERGENCY)
Dept: HOSPITAL 45 - C.EDB | Age: 78
Discharge: HOME | End: 2018-04-04
Payer: COMMERCIAL

## 2018-04-04 VITALS
WEIGHT: 139.11 LBS | HEIGHT: 65 IN | BODY MASS INDEX: 23.18 KG/M2 | BODY MASS INDEX: 23.18 KG/M2 | WEIGHT: 139.11 LBS | HEIGHT: 65 IN

## 2018-04-04 VITALS — OXYGEN SATURATION: 99 % | HEART RATE: 59 BPM | DIASTOLIC BLOOD PRESSURE: 89 MMHG | SYSTOLIC BLOOD PRESSURE: 137 MMHG

## 2018-04-04 DIAGNOSIS — Z79.82: ICD-10-CM

## 2018-04-04 DIAGNOSIS — N17.9: ICD-10-CM

## 2018-04-04 DIAGNOSIS — A41.01: ICD-10-CM

## 2018-04-04 DIAGNOSIS — Z96.652: ICD-10-CM
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
## 2018-04-04 DIAGNOSIS — N40.0: ICD-10-CM

## 2018-04-04 DIAGNOSIS — Z88.8: ICD-10-CM

## 2018-04-04 DIAGNOSIS — T85.9XXA: Primary | ICD-10-CM

## 2018-04-04 DIAGNOSIS — Z87.891: ICD-10-CM

## 2018-04-04 DIAGNOSIS — I63.9: ICD-10-CM

## 2018-04-04 NOTE — EMERGENCY ROOM VISIT NOTE
History


Report prepared by Dima:  Peterson Ledezma


Under the Supervision of:  Dr. Vincent Dolan M.D.


First contact with patient:  21:06


Chief Complaint:  CATHETER REPLACEMENT


Stated Complaint:  APPEARS IF CATHERTER IS NOT DRAINING





History of Present Illness


The patient is a 77 year old  male with a past medical history of an 

enlarged prostate, hydronephrosis, pyelonephritis, and kidney stones who 

presents to the ED with a cc of constant burning abdominal pain beginning five 

hours ago. Negative for nausea, vomiting, fevers, and chills. The patient 

states that he has a catheter placed for his enlarged prostate and is feeling a 

pressure in his bladder. He notes that his symptoms feel as though he needs to 

urinate. He reports that the last time his bag was changed was five hours ago, 

and that he has not been able to drain a significant amount of urine since. The 

patient states that he has had a catheter that has been blocked before and 

needed to be replaced. He notes that he had his previous catheter changed 5 

days ago.





   Source of History:  patient


   Onset:  five hours ago


   Position:  abdomen


   Quality:  pressure, burning


   Associated Symptoms:  No fevers, No chills, No nausea, No vomiting


Note:


The patient states that his catheter is not draining.





Review of Systems


See HPI for pertinent positives and negatives.  A total of ten systems were 

reviewed and were otherwise negative.





Past Medical & Surgical


Medical Problems:


(1) Acute renal failure


(2) BPH (benign prostatic hyperplasia)


(3) Chronic anemia


(4) CVA tenderness


(5) Deviated nasal septum


(6) Enlarged prostate


(7) Hernia


(8) Hydronephrosis


(9) Kidney stones


(10) MSSA (methicillin susceptible Staphylococcus aureus) septicemia


(11) Pyelonephritis


(12) Renal cyst


(13) Rheumatoid arthritis


(14) Suprapubic pain, acute


Surgical Problems:


(1) H/O inguinal hernia repair


(2) History of hip replacement


(3) History of knee replacement


(4) History of left knee replacement


(5) History of right hip replacement


(6) Hx of nasal septoplasty


(7) S/P carpal tunnel release


(8) S/P rotator cuff repair








Family History





FH: CAD (coronary artery disease)


  BROTHER


FH: cancer





Social History


Smoking Status:  Former Smoker


Alcohol Use:  none


Marital Status:  


Housing Status:  lives with family


Occupation Status:  retired





Current/Historical Medications


Scheduled


Ascorbic Acid (Ascorbic Acid), 500 MG PO BID


Aspirin (Aspirin EC Low Dose), 81 MG PO BID


Diltiazem Hcl Ext Rel (Tiazac), 180 MG PO DAILY


Docusate Sodium (Colace), 100 MG PO HS


Ferrous Sulfate (Kp Ferrous Sulfate), 325 MG PO BID


Finasteride (Proscar), 5 MG PO HS


Folic Acid (Folic Acid), 1 MG PO QAM


Metoprolol Tartrate (Lopressor) (Lopressor), 25 MG PO BID


Multivitamin (Multivitamin), 1 TABLET PO QAM


Oxybutynin Chloride (Oxybutynin Chloride), 5 MG PO TID


Potassium Chloride (Micro-K Ext Rel), 20 MEQ PO BID


Prednisone Tab (Prednisone), 10 MG PO DAILY


Ranitidine (Zantac), 150 MG PO BID


Tamsulosin Hcl (Flomax), 0.4 MG PO DAILY





Scheduled PRN


Acetaminophen (Tylenol), 1,000 MG PO UD PRN for Pain





Allergies


Coded Allergies:  


     Lisinopril (Verified  Adverse Reaction, Mild, COUGH, 4/4/18)


Uncoded Allergies:  


     ppi (Adverse Reaction, Severe, worsened Cr, 3/8/18)


 may be associated with interstitial neprhitis





Physical Exam


Vital Signs











  Date Time  Temp Pulse Resp B/P (MAP) Pulse Ox O2 Delivery O2 Flow Rate FiO2


 


4/4/18 22:48  59 18 137/89 99   


 


4/4/18 21:03 36.5 66 18 156/97 95 Room Air  











Physical Exam


GENERAL: Awake, alert, well-appearing, NAD, wearing glasses.


HENT: Normocephalic, atraumatic.


EYES: Normal conjunctiva. Sclera non-icteric.


NECK: Supple. No nuchal rigidity. FROM.


RESPIRATORY: CTAB, no rhonchi, wheezing, crackles


CARDIAC: RRR, no MRG


ABDOMEN: Soft, BS+, suprapubic discomfort, fullness.


MSK: No chest wall TTP, no LE edema


NEURO: GCS 15, CN 2-12 intact, moves all 4s on command


SKIN: No rash or jaundice noted.





Medical Decision & Procedures


ED Course


2114: The patient was evaluated in room B12. A complete history and physical 

exam was performed.





2230: I reevaluated the patient. Discussed results and discharge instructions: 

He verbalized understanding and agreement. The patient is ready for discharge.





Medical Decision


Nursing notes reviewed. Ancillary studies and prior records reviewed. 





The patient is a 77 year old  male with a past medical history of an 

enlarged prostate, hydronephrosis, pyelonephritis, kidney stones and who 

presents to the ED with a cc of constant burning abdominal pain beginning five 

hours ago. Negative for nausea, vomiting, fevers, and chills. 





Differential diagnoses include: catheter malfunction, worsening BPH, urethral 

calculi, and stricture. 





Patient was seen and evaluated the bedside.  Patient had had some worsening 

output from his Lutz catheter.  The patient does use this for BPH.  This was 

recently changed an outpatient urology visit on Friday.  Patient does complain 

of some mild suprapubic discomfort and fullness.  Patient denies any trauma.





The patient was unsuccessfully irrigated at the bedside.  The patient did have 

a daily that was placed.  The patient did have resolution of his discomfort at 

approximately 6 50 cc of straw-colored urine in the bag.  Given that the 

patient has normal vital signs, is not febrile, and his decreased urine output 

was transient I do not believe that he requires any further blood work or 

treatment at this time.  The patient again has resolution of his symptoms.  

Patient was told to follow-up with urology as needed and to return if he had 

any worsening symptoms.  Patient was agreeable to this plan of care.  Patient 

was given strict follow-up, discharge, and return precautions.  All questions 

were answered.  Patient was deemed suitable for outpatient follow-up at this 

time.  Patient agreed with the plan of care and was safely discharged home.





Blood Pressure Screening


Patient's blood pressure:  Elevated blood pressure


Blood pressure disposition:  Elevated BP felt to be situational





Impression





 Primary Impression:  


 Complication of catheter


 Additional Impression:  


 History of left knee replacement





Scribe Attestation


The scribe's documentation has been prepared under my direction and personally 

reviewed by me in its entirety. I confirm that the note above accurately 

reflects all work, treatment, procedures, and medical decision making performed 

by me.





Departure Information


Dispostion


Home / Self-Care





Referrals


No Doctor, Assigned (PCP)





Forms


HOME CARE DOCUMENTATION FORM,                                                 

               IMPORTANT VISIT INFORMATION





Patient Instructions


Benign Prostatic Hyperplasia, ED Catheter Care Rosa Isela Lutz [employer]





Additional Instructions





Please return to the emergency department if you have worsening or recurrent 

symptoms not amenable to at-home treatment.  Please call for a follow-up 

appointment with her primary care physician.  Please take your medications as 

prescribed.  If you have other concerns and/or complaints please feel free to 

also call your primary care physician's office or return the ED for further 

evaluation, management, and treatment.





Take your medications as prescribed.  Please follow-up with your urologist as 

scheduled.  Return if you do not have good flow of urine.





You have been examined and treated today on an emergency basis only. This is 

not a substitute for, or an effort to provide, complete comprehensive medical 

care. It is impossible to recognize and treat all injuries or illnesses in a 

single emergency department visit. It is therefore important that you follow up 

closely with [facility], your PCP, and/or your specialist(s). 

Call as soon as possible for an appointment.





Thank you for your time and consideration. I look forward to speaking with you 

again soon. Please don't hesitate to call us if you have any questions.





Problem Qualifiers








 Primary Impression:  


 Complication of catheter


 Encounter type:  initial encounter  Qualified Codes:  T85.9XXA - Unspecified 

complication of internal prosthetic device, implant and graft, initial encounter

## 2018-04-09 ENCOUNTER — HOSPITAL ENCOUNTER (OUTPATIENT)
Dept: HOSPITAL 45 - C.LABMFLN | Age: 78
Discharge: HOME | End: 2018-04-09
Attending: UROLOGY
Payer: COMMERCIAL

## 2018-04-09 DIAGNOSIS — N17.9: Primary | ICD-10-CM

## 2018-04-09 DIAGNOSIS — M85.80: ICD-10-CM

## 2018-04-09 DIAGNOSIS — N40.1: ICD-10-CM

## 2018-04-09 DIAGNOSIS — D64.9: ICD-10-CM

## 2018-04-09 LAB
ALBUMIN SERPL-MCNC: 3.1 GM/DL (ref 3.4–5)
ALP SERPL-CCNC: 72 U/L (ref 45–117)
ALT SERPL-CCNC: 16 U/L (ref 12–78)
AST SERPL-CCNC: 9 U/L (ref 15–37)
BASOPHILS # BLD: 0.01 K/UL (ref 0–0.2)
BASOPHILS NFR BLD: 0.1 %
BUN SERPL-MCNC: 47 MG/DL (ref 7–18)
CALCIUM SERPL-MCNC: 9.8 MG/DL (ref 8.5–10.1)
CO2 SERPL-SCNC: 27 MMOL/L (ref 21–32)
CREAT SERPL-MCNC: 2.36 MG/DL (ref 0.6–1.4)
EOS ABS #: 0 K/UL (ref 0–0.5)
EOSINOPHIL NFR BLD AUTO: 194 K/UL (ref 130–400)
GLUCOSE SERPL-MCNC: 162 MG/DL (ref 70–99)
HCT VFR BLD CALC: 32.2 % (ref 42–52)
HGB BLD-MCNC: 10 G/DL (ref 14–18)
IG#: 0.14 K/UL (ref 0–0.02)
IMM GRANULOCYTES NFR BLD AUTO: 8.8 %
INR PPP: 1 (ref 0.9–1.1)
LYMPHOCYTES # BLD: 0.83 K/UL (ref 1.2–3.4)
MCH RBC QN AUTO: 28.2 PG (ref 25–34)
MCHC RBC AUTO-ENTMCNC: 31.1 G/DL (ref 32–36)
MCV RBC AUTO: 90.7 FL (ref 80–100)
MONO ABS #: 0.3 K/UL (ref 0.11–0.59)
MONOCYTES NFR BLD: 3.2 %
NEUT ABS #: 8.15 K/UL (ref 1.4–6.5)
NEUTROPHILS # BLD AUTO: 0 %
NEUTROPHILS NFR BLD AUTO: 86.4 %
PMV BLD AUTO: 10.4 FL (ref 7.4–10.4)
POTASSIUM SERPL-SCNC: 4.8 MMOL/L (ref 3.5–5.1)
PROT SERPL-MCNC: 7.3 GM/DL (ref 6.4–8.2)
PTT PATIENT: 27.7 SECONDS (ref 21–31)
RED CELL DISTRIBUTION WIDTH CV: 20.5 % (ref 11.5–14.5)
RED CELL DISTRIBUTION WIDTH SD: 68.7 FL (ref 36.4–46.3)
SODIUM SERPL-SCNC: 137 MMOL/L (ref 136–145)
TRANSFERRIN SERPL-MCNC: 182 MG/DL (ref 200–360)
WBC # BLD AUTO: 9.43 K/UL (ref 4.8–10.8)

## 2018-04-10 LAB
BUN SERPL-MCNC: 48 MG/DL (ref 7–18)
CALCIUM SERPL-MCNC: 9.3 MG/DL (ref 8.5–10.1)
CO2 SERPL-SCNC: 25 MMOL/L (ref 21–32)
CREAT SERPL-MCNC: 2.18 MG/DL (ref 0.6–1.4)
GLUCOSE SERPL-MCNC: 121 MG/DL (ref 70–99)
POTASSIUM SERPL-SCNC: 4.9 MMOL/L (ref 3.5–5.1)
SODIUM SERPL-SCNC: 137 MMOL/L (ref 136–145)

## 2018-04-10 NOTE — PAT MEDICATION INSTRUCTIONS
Service Date


Apr 10, 2018.





Current Home Medication List


Acetaminophen (Tylenol), 1,000 MG PO UD PRN for Pain


Ascorbic Acid (Ascorbic Acid), 500 MG PO BID


Aspirin (Aspirin Ec), 81 MG PO QAM


Cholecalciferol (Vitamin D3), 1 TAB PO QAM


Diltiazem Hcl Ext Rel (Tiazac), 180 MG PO QAM


Docusate Sodium (Colace), 100 MG PO HS


Ferrous Sulfate (Kp Ferrous Sulfate), 325 MG PO BID


Finasteride (Proscar), 5 MG PO HS


Folic Acid (Folic Acid), 1 MG PO QAM


Metoprolol Tartrate (Lopressor) (Lopressor), 25 MG PO BID


Multivitamin (Multivitamin), 1 TABLET PO QAM


Nystatin (Nystatin Suspension), 1 DOSE PO UD


Potassium Chloride (Micro-K Ext Rel), 20 MEQ PO BID


Prednisone Tab (Prednisone), 10 MG PO UD


Ranitidine (Zantac), 150 MG PO BID


Tamsulosin Hcl (Flomax), 0.4 MG PO QPM


[Oxybutynin], 5 MG PO TID





Medication Instructions


For Your Scheduled Surgery 





- Hold the following medications starting 04/18 per your surgeon's instructions:


Aspirin (Aspirin Ec), 81 MG PO QAM








- Continue as directed (OK to take on day of surgery):


Prednisone Tab (Prednisone), 10 MG PO UD








- Hold the following medications the morning of surgery:


Ascorbic Acid (Ascorbic Acid), 500 MG PO BID


Cholecalciferol (Vitamin D3), 1 TAB PO QAM


Ferrous Sulfate (Kp Ferrous Sulfate), 325 MG PO BID


Folic Acid (Folic Acid), 1 MG PO QAM


Multivitamin (Multivitamin), 1 TABLET PO QAM


Nystatin (Nystatin Suspension), 1 DOSE PO UD


Potassium Chloride (Micro-K Ext Rel), 20 MEQ PO BID


[Oxybutynin], 5 MG PO TID








- Take the following medications the morning of surgery with a sip of water:


Acetaminophen (Tylenol), 1,000 MG PO UD PRN for Pain (if needed, can be taken 

up to four hours before surgery)


Diltiazem Hcl Ext Rel (Tiazac), 180 MG PO QAM


Metoprolol Tartrate (Lopressor) (Lopressor), 25 MG PO BID


Ranitidine (Zantac), 150 MG PO BID











- Take the following medications as scheduled the night before surgery:


Acetaminophen (Tylenol), 1,000 MG PO UD PRN for Pain (if needed)


Ascorbic Acid (Ascorbic Acid), 500 MG PO BID


Docusate Sodium (Colace), 100 MG PO HS


Ferrous Sulfate (Kp Ferrous Sulfate), 325 MG PO BID


Finasteride (Proscar), 5 MG PO HS


Tamsulosin Hcl (Flomax), 0.4 MG PO QPM


[Oxybutynin], 5 MG PO TID





If you have any questions please call us at [phone] or [phone] or 

[phone]

## 2018-04-16 ENCOUNTER — HOSPITAL ENCOUNTER (EMERGENCY)
Dept: HOSPITAL 45 - C.EDB | Age: 78
Discharge: HOME | End: 2018-04-16
Payer: COMMERCIAL

## 2018-04-16 VITALS
WEIGHT: 141.98 LBS | HEIGHT: 65 IN | BODY MASS INDEX: 23.65 KG/M2 | WEIGHT: 141.98 LBS | HEIGHT: 65 IN | BODY MASS INDEX: 23.65 KG/M2

## 2018-04-16 VITALS — SYSTOLIC BLOOD PRESSURE: 156 MMHG | DIASTOLIC BLOOD PRESSURE: 84 MMHG | OXYGEN SATURATION: 95 % | HEART RATE: 59 BPM

## 2018-04-16 VITALS — TEMPERATURE: 97.88 F

## 2018-04-16 DIAGNOSIS — Z46.6: Primary | ICD-10-CM

## 2018-04-16 DIAGNOSIS — Z96.652: ICD-10-CM

## 2018-04-16 DIAGNOSIS — Z86.19: ICD-10-CM

## 2018-04-16 DIAGNOSIS — Z88.8: ICD-10-CM

## 2018-04-16 DIAGNOSIS — Z88.0: ICD-10-CM

## 2018-04-16 DIAGNOSIS — D64.9: ICD-10-CM

## 2018-04-16 DIAGNOSIS — N40.0: ICD-10-CM

## 2018-04-16 DIAGNOSIS — Z87.442: ICD-10-CM

## 2018-04-16 DIAGNOSIS — Z79.899: ICD-10-CM

## 2018-04-16 DIAGNOSIS — Z79.82: ICD-10-CM

## 2018-04-16 DIAGNOSIS — Z80.9: ICD-10-CM

## 2018-04-16 DIAGNOSIS — Z82.49: ICD-10-CM

## 2018-04-16 DIAGNOSIS — Z96.641: ICD-10-CM

## 2018-04-16 DIAGNOSIS — M06.9: ICD-10-CM

## 2018-04-16 NOTE — EMERGENCY ROOM VISIT NOTE
History


Report prepared by Dima:  Carlita Saldana


Under the Supervision of:  Dr. Scott Miranda D.O.


First contact with patient:  21:34


Chief Complaint:  CATHETER REPLACEMENT


Stated Complaint:  CATHETER DISLODGED





History of Present Illness


The patient is a 77 year old male who presents to the Emergency Room with 

complaints of an episode of catheter dislodgement 1 hour ago. The patient has 

had a catheter for several months now because of an enlarged prostate. The 

current catheter has been in for 2 weeks now. It fell out around 1 hour ago. He 

denies any abdominal pain, nausea, or vomiting. He currently does not have any 

other concerns.





   Source of History:  patient, spouse/significant other


   Onset:  1 hour ago


   Position:  other (catheter)


   Quality:  other (dislodged)


   Timing:  other (episodic)


   Associated Symptoms:  No nausea, No vomiting, No abdominal pain





Review of Systems


See HPI for pertinent positives & negatives. A total of 10 systems reviewed and 

were otherwise negative.





Past Medical & Surgical


Medical Problems:


(1) Acute renal failure


(2) BPH (benign prostatic hyperplasia)


(3) Chronic anemia


(4) CVA tenderness


(5) Deviated nasal septum


(6) Enlarged prostate


(7) Hernia


(8) Hydronephrosis


(9) Kidney stones


(10) MSSA (methicillin susceptible Staphylococcus aureus) septicemia


(11) Pyelonephritis


(12) Renal cyst


(13) Rheumatoid arthritis


(14) Suprapubic pain, acute


Surgical Problems:


(1) H/O inguinal hernia repair


(2) History of hip replacement


(3) History of knee replacement


(4) History of left knee replacement


(5) History of right hip replacement


(6) Hx of nasal septoplasty


(7) S/P carpal tunnel release


(8) S/P rotator cuff repair








Family History





FH: CAD (coronary artery disease)


  BROTHER


FH: cancer





Social History


Smoking Status:  Former Smoker


Alcohol Use:  none


Marital Status:  


Housing Status:  lives with family


Occupation Status:  retired





Current/Historical Medications


Scheduled


Ascorbic Acid (Ascorbic Acid), 500 MG PO BID


Aspirin (Aspirin Ec), 81 MG PO QAM


Cholecalciferol (Vitamin D3), 1 TAB PO QAM


Diltiazem Hcl Ext Rel (Tiazac), 180 MG PO QAM


Docusate Sodium (Colace), 100 MG PO HS


Ferrous Sulfate (Kp Ferrous Sulfate), 325 MG PO BID


Finasteride (Proscar), 5 MG PO HS


Folic Acid (Folic Acid), 1 MG PO QAM


Metoprolol Tartrate (Lopressor) (Lopressor), 25 MG PO BID


Multivitamin (Multivitamin), 1 TABLET PO QAM


Nystatin (Nystatin Suspension), 1 DOSE PO UD


Oxybutynin Chloride (Ditropan), 5 MG PO TID


Potassium Chloride (Micro-K Ext Rel), 20 MEQ PO BID


Prednisone Tab (Prednisone), 10 MG PO UD


Ranitidine (Zantac), 150 MG PO BID


Tamsulosin Hcl (Flomax), 0.4 MG PO QPM





Scheduled PRN


Acetaminophen (Tylenol), 1,000 MG PO UD PRN for Pain





Allergies


Coded Allergies:  


     Amoxicillin (Verified  Allergy, Unknown, AFFECTED KIDNEY, 4/16/18)


     Clavulanic Acid (Verified  Allergy, Unknown, AFFECTED KIDNEY, 4/16/18)


     Unclassified Drugs (Verified  Allergy, Unknown, PPI'S AFFFECTED KIDNEYS, 4/ 16/18)


     Lisinopril (Verified  Adverse Reaction, Mild, COUGH, 4/16/18)





Physical Exam


Vital Signs











  Date Time  Temp Pulse Resp B/P (MAP) Pulse Ox O2 Delivery O2 Flow Rate FiO2


 


4/16/18 22:35  59 18 156/84 95   


 


4/16/18 21:31 36.6 72 18 141/86 97 Room Air  











Physical Exam


GENERAL:  Patient is awake, alert, and in no acute distress. Patient is resting 

comfortably and showing no signs of anxiety


EYES: The conjunctivae are clear.  The pupils are round and reactive. 


EARS, NOSE, MOUTH AND THROAT: The nose is without any evidence of any 

deformity. Mucous membranes are moist tongue is midline 


NECK: The neck is nontender and supple.


RESPIRATORY: Normal respiratory effort is noted there is no evidence of 

wheezing rhonchi or rales


CARDIOVASCULAR:  Regular rate and rhythm noted to auscultation. Systolic murmur 

noted. 


GASTROINTESTINAL: The abdomen is soft. Bowel sounds are present in all 

quadrants. Abdomen is nontender


MUSCULOSKELETAL/EXTREMITIES: There is no evidence of gross deformity full range 

of motion is noted in the hips and shoulders


SKIN: There is no obvious evidence of any rash. There are no petechiae, pallor 

or cyanosis noted. 


NEUROLOGIC:  Gait was steady. Patient is awake alert and oriented x3.





Medical Decision & Procedures


ED Course


2135: The patient was evaluated in room C10. A complete history and physical 

examination were performed. I discussed the results and treatment plan with 

him. He verbalized agreement of the treatment plan. He was discharged home 

after Lutz catheter replacement.





Medical Decision


Nursing notes reviewed.





Patient's previous electronic medical records reviewed.





The patient is a 77-year-old male who presented to the emergency department 

because his Lutz catheter dislodged.  He has had trouble with Lutz catheter 

and enlarged prostate issues.  The Lutz catheter was replaced without 

difficulty by the nursing staff.  The patient was encouraged to follow-up with 

his primary urologist as scheduled.





Medication Reconcilliation


Current Medication List:  was personally reviewed by me





Impression





 Primary Impression:  


 Encounter for Lutz catheter replacement





Scribe Attestation


The scribe's documentation has been prepared under my direction and personally 

reviewed by me in its entirety. I confirm that the note above accurately 

reflects all work, treatment, procedures, and medical decision making performed 

by me.





Departure Information


Dispostion


Home / Self-Care





Referrals


No Doctor, Assigned (PCP)








Sukhjinder Cm M.D.





Forms


HOME CARE DOCUMENTATION FORM,                                                 

               IMPORTANT VISIT INFORMATION





Patient Instructions


ED Catheter Care Nelda, Rosa Isela [facility]





Additional Instructions





Continue to use the Lutz catheter as before.  Follow-up with your primary 

urologist for further evaluation.  Return to the emergency department 

immediately if symptoms change worsening the need arises.

## 2018-04-23 ENCOUNTER — HOSPITAL ENCOUNTER (OUTPATIENT)
Dept: HOSPITAL 45 - C.ACU | Age: 78
Setting detail: OBSERVATION
LOS: 1 days | Discharge: HOME | End: 2018-04-24
Attending: UROLOGY | Admitting: UROLOGY
Payer: COMMERCIAL

## 2018-04-23 VITALS
TEMPERATURE: 97.34 F | OXYGEN SATURATION: 98 % | HEART RATE: 67 BPM | DIASTOLIC BLOOD PRESSURE: 77 MMHG | SYSTOLIC BLOOD PRESSURE: 170 MMHG

## 2018-04-23 VITALS
SYSTOLIC BLOOD PRESSURE: 125 MMHG | DIASTOLIC BLOOD PRESSURE: 75 MMHG | TEMPERATURE: 97.88 F | OXYGEN SATURATION: 96 % | HEART RATE: 56 BPM

## 2018-04-23 VITALS
BODY MASS INDEX: 23.4 KG/M2 | HEIGHT: 65 IN | WEIGHT: 140.43 LBS | WEIGHT: 140.43 LBS | BODY MASS INDEX: 23.4 KG/M2 | HEIGHT: 65 IN

## 2018-04-23 VITALS
SYSTOLIC BLOOD PRESSURE: 157 MMHG | TEMPERATURE: 97.88 F | HEART RATE: 64 BPM | DIASTOLIC BLOOD PRESSURE: 62 MMHG | OXYGEN SATURATION: 96 %

## 2018-04-23 VITALS — OXYGEN SATURATION: 97 %

## 2018-04-23 VITALS
DIASTOLIC BLOOD PRESSURE: 84 MMHG | SYSTOLIC BLOOD PRESSURE: 159 MMHG | TEMPERATURE: 97.7 F | OXYGEN SATURATION: 97 % | HEART RATE: 82 BPM

## 2018-04-23 VITALS
OXYGEN SATURATION: 96 % | SYSTOLIC BLOOD PRESSURE: 152 MMHG | DIASTOLIC BLOOD PRESSURE: 85 MMHG | TEMPERATURE: 97.52 F | HEART RATE: 73 BPM

## 2018-04-23 VITALS — SYSTOLIC BLOOD PRESSURE: 175 MMHG | DIASTOLIC BLOOD PRESSURE: 89 MMHG

## 2018-04-23 VITALS
SYSTOLIC BLOOD PRESSURE: 149 MMHG | DIASTOLIC BLOOD PRESSURE: 78 MMHG | OXYGEN SATURATION: 96 % | TEMPERATURE: 97.52 F | HEART RATE: 68 BPM

## 2018-04-23 VITALS
HEART RATE: 75 BPM | DIASTOLIC BLOOD PRESSURE: 81 MMHG | OXYGEN SATURATION: 97 % | TEMPERATURE: 97.88 F | SYSTOLIC BLOOD PRESSURE: 151 MMHG

## 2018-04-23 VITALS — DIASTOLIC BLOOD PRESSURE: 77 MMHG | SYSTOLIC BLOOD PRESSURE: 171 MMHG | HEART RATE: 83 BPM

## 2018-04-23 VITALS
SYSTOLIC BLOOD PRESSURE: 162 MMHG | HEART RATE: 68 BPM | OXYGEN SATURATION: 97 % | TEMPERATURE: 97.52 F | DIASTOLIC BLOOD PRESSURE: 83 MMHG

## 2018-04-23 DIAGNOSIS — N13.8: ICD-10-CM

## 2018-04-23 DIAGNOSIS — Z81.8: ICD-10-CM

## 2018-04-23 DIAGNOSIS — Z96.652: ICD-10-CM

## 2018-04-23 DIAGNOSIS — Z80.3: ICD-10-CM

## 2018-04-23 DIAGNOSIS — Z79.82: ICD-10-CM

## 2018-04-23 DIAGNOSIS — E78.5: ICD-10-CM

## 2018-04-23 DIAGNOSIS — I10: ICD-10-CM

## 2018-04-23 DIAGNOSIS — Z87.891: ICD-10-CM

## 2018-04-23 DIAGNOSIS — N40.1: Primary | ICD-10-CM

## 2018-04-23 DIAGNOSIS — M19.90: ICD-10-CM

## 2018-04-23 DIAGNOSIS — K21.9: ICD-10-CM

## 2018-04-23 DIAGNOSIS — M06.9: ICD-10-CM

## 2018-04-23 DIAGNOSIS — Z92.241: ICD-10-CM

## 2018-04-23 DIAGNOSIS — Z82.49: ICD-10-CM

## 2018-04-23 DIAGNOSIS — N17.9: ICD-10-CM

## 2018-04-23 DIAGNOSIS — D64.9: ICD-10-CM

## 2018-04-23 DIAGNOSIS — Z96.641: ICD-10-CM

## 2018-04-23 DIAGNOSIS — N13.30: ICD-10-CM

## 2018-04-23 LAB
BASOPHILS # BLD: 0.05 K/UL (ref 0–0.2)
BASOPHILS NFR BLD: 0.5 %
BUN SERPL-MCNC: 41 MG/DL (ref 7–18)
BUN SERPL-MCNC: 42 MG/DL (ref 7–18)
CALCIUM SERPL-MCNC: 8.9 MG/DL (ref 8.5–10.1)
CALCIUM SERPL-MCNC: 9.1 MG/DL (ref 8.5–10.1)
CO2 SERPL-SCNC: 23 MMOL/L (ref 21–32)
CO2 SERPL-SCNC: 24 MMOL/L (ref 21–32)
CREAT SERPL-MCNC: 2.92 MG/DL (ref 0.6–1.4)
CREAT SERPL-MCNC: 3.03 MG/DL (ref 0.6–1.4)
EOS ABS #: 0.3 K/UL (ref 0–0.5)
EOSINOPHIL NFR BLD AUTO: 228 K/UL (ref 130–400)
GLUCOSE SERPL-MCNC: 151 MG/DL (ref 70–99)
GLUCOSE SERPL-MCNC: 91 MG/DL (ref 70–99)
HCT VFR BLD CALC: 30.6 % (ref 42–52)
HGB BLD-MCNC: 9.8 G/DL (ref 14–18)
IG#: 0.51 K/UL (ref 0–0.02)
IMM GRANULOCYTES NFR BLD AUTO: 18.1 %
LYMPHOCYTES # BLD: 1.94 K/UL (ref 1.2–3.4)
MCH RBC QN AUTO: 28.7 PG (ref 25–34)
MCHC RBC AUTO-ENTMCNC: 32 G/DL (ref 32–36)
MCV RBC AUTO: 89.5 FL (ref 80–100)
MONO ABS #: 0.25 K/UL (ref 0.11–0.59)
MONOCYTES NFR BLD: 2.3 %
NEUT ABS #: 7.65 K/UL (ref 1.4–6.5)
NEUTROPHILS # BLD AUTO: 2.8 %
NEUTROPHILS NFR BLD AUTO: 71.5 %
PMV BLD AUTO: 9 FL (ref 7.4–10.4)
POTASSIUM SERPL-SCNC: 5.2 MMOL/L (ref 3.5–5.1)
POTASSIUM SERPL-SCNC: 5.7 MMOL/L (ref 3.5–5.1)
RED CELL DISTRIBUTION WIDTH CV: 18.9 % (ref 11.5–14.5)
RED CELL DISTRIBUTION WIDTH SD: 62.5 FL (ref 36.4–46.3)
SODIUM SERPL-SCNC: 133 MMOL/L (ref 136–145)
SODIUM SERPL-SCNC: 136 MMOL/L (ref 136–145)
WBC # BLD AUTO: 10.7 K/UL (ref 4.8–10.8)

## 2018-04-23 RX ADMIN — SODIUM CHLORIDE SCH MLS/HR: 0.45 INJECTION, SOLUTION INTRAVENOUS at 14:40

## 2018-04-23 RX ADMIN — METOPROLOL TARTRATE SCH MG: 25 TABLET, FILM COATED ORAL at 21:10

## 2018-04-23 RX ADMIN — FENTANYL CITRATE PRN MCG: 50 INJECTION, SOLUTION INTRAMUSCULAR; INTRAVENOUS at 12:00

## 2018-04-23 RX ADMIN — CEFUROXIME AXETIL SCH MG: 250 TABLET ORAL at 21:09

## 2018-04-23 RX ADMIN — FENTANYL CITRATE PRN MCG: 50 INJECTION, SOLUTION INTRAMUSCULAR; INTRAVENOUS at 12:05

## 2018-04-23 RX ADMIN — FENTANYL CITRATE PRN MCG: 50 INJECTION, SOLUTION INTRAMUSCULAR; INTRAVENOUS at 12:16

## 2018-04-23 NOTE — PROGRESS NOTE
Progress Note


Date of Service


Apr 23, 2018.





Progress Note


Pt with know CKD - post op labs revealed hyperkalemia in addition to known 

renal dysfunction


- stop LR


- start 1/2 NSS


- add a single dose of kayexalate


- no pot supplements


- spoke with pharmacy - renal dose zantac

## 2018-04-23 NOTE — [facility] OPERATIVE REPORT
Operative Report


Operative Date


Apr 23, 2018.





Pre-Operative Diagnosis





benign prostatic hyperplasia with urinary obstruction





Post-Operative Diagnosis





benign prostatic hyperplasia with urinary obstruction





Procedure(s) Performed





Cystoscopy; TURP; left ureteral stent exchange6 Barbadian by 2232 cm





Surgeon


Toi





Estimated Blood Loss


20 cc





Specimens





Prostate chips





Drains


Lutz catheter





Anesthesia Type


General





Complication(s)


none





Disposition


yes


Recovery Room / PACU





Indications


Urinary retention; renal failure; hydronephrosis





Description of Procedure


The patient was identified in the preoperative holding area, appropriate 

informed consents reviewed and completed and the patient was transported to the 

operating suite.  Upon arrival he received appropriate preoperative antibiotics 

in the form of ciprofloxacin.  Adequate general anesthesia was achieved and he 

was placed in dorsal lithotomy position.  Of note he has been in retention and 

had his Lutz catheter removed prior to sterile prep and drape.  I began the 

case by passing a 27 Barbadian resectoscope with visual obturator and 30 lens.  

Inspection revealed no evidence of stricture disease.  His prostate was 

extremely large lateral lobe hypertrophy as well as a significant intravesical 

component.  He had a previously placed left ureteral stent which was identified 

protruding from the left ureteral orifice.  Full evaluation of the bladder was 

carried out to the best of my ability identifying no mucosal abnormalities.  

After my inspection, exchanged the visual obturator for a button electrode and 

resecting element.  I incised the bladder neck at 5 and 7:00 with care to avoid 

encroachment upon the ureteral orifices.  I then stented this incision down to 

the verumontanum.  I exchanged the button for a loop element and resected the 

tissue between the 2 incisions.  After completing this resection of 

intravesical median lobe, I had a much improved field-of-view as well as 

significantly decreased obstruction from the prostate.  I proceeded to use a 

combination of the button and loop to resect the left lateral lobe and the 

right lateral lobe.  I completed my dissection by using a button electrode to 

trim the apical tissue adjacent to the verumontanum.  I ensured excellent 

hemostasis before concluding the resecting portion of this case.  I then 

withdrew my scope and reentered with a standard 22 Barbadian cystoscope and 30 

lens.  I grasped the distal end of the left ureteral stent withdrew to the 

urethral meatus.  I intubated with a sensor wire and advanced a new stent over 

the wire.  Of note, he previously had severe J hooking of the distal ureter and 

a tortuous proximal ureter.  This appears to straighten appropriately with 

relief of the obstruction from the stent.  The new stent showed a good curl in 

the bladder as well as the kidney.  I reconfirmed hemostasis before concluding 

the case entirely.  I withdrew my scope and placed a 22 Barbadian Lutz catheter 

without difficulty.  He was subsequently extubated and taken to the PACU in 

stable condition.


I attest to the content of the Intraoperative Record and any orders documented 

therein.  Any exceptions are noted below.

## 2018-04-23 NOTE — ANESTHESIOLOGY PROGRESS NOTE
Anesthesia Post Op Note


Date & Time


Apr 23, 2018 at 12:50





Vital Signs


Pain Intensity:  3





Vital Signs Past 12 Hours








  Date Time  Temp Pulse Resp B/P (MAP) Pulse Ox O2 Delivery O2 Flow Rate FiO2


 


4/23/18 12:40 36.8 67 15 153/81 97 Nasal Cannula 2 


 


4/23/18 12:30  80 18 131/85 97 Nasal Cannula 2 


 


4/23/18 12:20  72 18 139/79 96 Nasal Cannula 2 


 


4/23/18 12:10  59 17 168/97 99 Oxymask 10 


 


4/23/18 12:00  66 14 158/93 98 Oxymask 10 


 


4/23/18 11:52 36.9 80 16 127/90 99 Oxymask 10 


 


4/23/18 08:21 36.6 56 20 125/75 (92) 96 Room Air  











Notes


Mental Status:  alert / awake / arousable, participated in evaluation


Pt Amnestic to Procedure:  Yes


Nausea / Vomiting:  adequately controlled


Pain:  adequately controlled


Airway Patency, RR, SpO2:  stable & adequate


BP & HR:  stable & adequate


Hydration State:  stable & adequate


Anesthetic Complications:  no major complications apparent

## 2018-04-23 NOTE — DIAGNOSTIC IMAGING REPORT
KUB'S DURING STENT EXCHANGE



CLINICAL HISTORY: STENT EXCHANGE    



COMPARISON STUDY:  KUB dated 3/5/2018



FINDINGS: 4 fluoroscopic spot images were provided for interpretation. 14

seconds of fluoroscopic time was utilized. Image #2 demonstrates the proximal

aspect of a nephroureteral stent. Images #3 and 4 demonstrate the introduction

of a guidewire into the left-sided nephroureteral stent. Image #5 demonstrates

the proximal pigtail of a left-sided nephroureteral stent.



IMPRESSION:  Intraoperative KUBs obtained during left-sided stent exchange. 









Electronically signed by:  Edgar Coleman M.D.

4/23/2018 12:47 PM



Dictated Date/Time:  4/23/2018 12:45 PM

## 2018-04-24 VITALS — OXYGEN SATURATION: 97 % | HEART RATE: 68 BPM | SYSTOLIC BLOOD PRESSURE: 145 MMHG | DIASTOLIC BLOOD PRESSURE: 90 MMHG

## 2018-04-24 VITALS
SYSTOLIC BLOOD PRESSURE: 174 MMHG | OXYGEN SATURATION: 98 % | TEMPERATURE: 97.88 F | HEART RATE: 73 BPM | DIASTOLIC BLOOD PRESSURE: 94 MMHG

## 2018-04-24 VITALS
OXYGEN SATURATION: 98 % | TEMPERATURE: 97.88 F | HEART RATE: 73 BPM | SYSTOLIC BLOOD PRESSURE: 174 MMHG | DIASTOLIC BLOOD PRESSURE: 94 MMHG

## 2018-04-24 VITALS
HEART RATE: 75 BPM | TEMPERATURE: 98.06 F | OXYGEN SATURATION: 97 % | DIASTOLIC BLOOD PRESSURE: 84 MMHG | SYSTOLIC BLOOD PRESSURE: 156 MMHG

## 2018-04-24 LAB
BASOPHILS # BLD: 0.03 K/UL (ref 0–0.2)
BASOPHILS NFR BLD: 0.2 %
BUN SERPL-MCNC: 37 MG/DL (ref 7–18)
CALCIUM SERPL-MCNC: 8.7 MG/DL (ref 8.5–10.1)
CO2 SERPL-SCNC: 25 MMOL/L (ref 21–32)
CREAT SERPL-MCNC: 2.83 MG/DL (ref 0.6–1.4)
EOS ABS #: 0.03 K/UL (ref 0–0.5)
EOSINOPHIL NFR BLD AUTO: 250 K/UL (ref 130–400)
GLUCOSE SERPL-MCNC: 149 MG/DL (ref 70–99)
HCT VFR BLD CALC: 30.9 % (ref 42–52)
HGB BLD-MCNC: 9.9 G/DL (ref 14–18)
IG#: 0.3 K/UL (ref 0–0.02)
IMM GRANULOCYTES NFR BLD AUTO: 12 %
LYMPHOCYTES # BLD: 1.49 K/UL (ref 1.2–3.4)
MCH RBC QN AUTO: 28.3 PG (ref 25–34)
MCHC RBC AUTO-ENTMCNC: 32 G/DL (ref 32–36)
MCV RBC AUTO: 88.3 FL (ref 80–100)
MONO ABS #: 0.74 K/UL (ref 0.11–0.59)
MONOCYTES NFR BLD: 6 %
NEUT ABS #: 9.78 K/UL (ref 1.4–6.5)
NEUTROPHILS # BLD AUTO: 0.2 %
NEUTROPHILS NFR BLD AUTO: 79.2 %
PMV BLD AUTO: 9.1 FL (ref 7.4–10.4)
POTASSIUM SERPL-SCNC: 5 MMOL/L (ref 3.5–5.1)
RED CELL DISTRIBUTION WIDTH CV: 18.6 % (ref 11.5–14.5)
RED CELL DISTRIBUTION WIDTH SD: 60.5 FL (ref 36.4–46.3)
SODIUM SERPL-SCNC: 135 MMOL/L (ref 136–145)
WBC # BLD AUTO: 12.37 K/UL (ref 4.8–10.8)

## 2018-04-24 RX ADMIN — CEFUROXIME AXETIL SCH MG: 250 TABLET ORAL at 08:30

## 2018-04-24 RX ADMIN — SODIUM CHLORIDE SCH MLS/HR: 0.45 INJECTION, SOLUTION INTRAVENOUS at 04:12

## 2018-04-24 RX ADMIN — METOPROLOL TARTRATE SCH MG: 25 TABLET, FILM COATED ORAL at 08:30

## 2018-04-24 NOTE — DISCHARGE INSTRUCTIONS
Discharge Instructions


Date of Service


Apr 24, 2018.





Admission


Reason for Admission:  Benign Prostatic Hyperplasia W/Urinary Obstruction





Discharge


Discharge Diagnosis / Problem:  Benign prostatic hyperplasia with urinary 

obstruction





Discharge Goals


Goal(s):  Decrease discomfort, Increase independence, Improve disease control, 

Therapeutic intervention





Activity Recommendations


Activity Limitations:  as noted below


Lifting Limitations:  no more than 25 pounds (x 2 weeks )


Exercise/Sports Limitations:  rest today, gradually increase as tolerated (

light activity x 2 weeks; No heavy lifting, yard work, or riding lawn mowers or 

farm equipment x 2 weeks )


May Resume Sexual Activity:  after follow-up appointment


Shower/Bathe:  no limitations


Driving or Machine Use:  resume 3 days after discharge (Do not drive while 

taking narcotics )





.





Instructions / Follow-Up


Instructions / Follow-Up


4-26-18 Dr. Mason 2:45pm- Follow-up for kidney function. Please discuss 

Zantac dosing with Dr. Mason at this time. Discontinue until follow-up with 

Dr. Mason. The medication needs to be dosed based on kidney function. 





5-7-18 Dr. Cm 3:20pm- Follow-up for BPH. 





5-25-18 Dr. Trinidad 10:45am- Follow-up with primary care physician. Recommend 

you call Dr. Trinidad's office to move up appointment for recent abdominal 

symptoms and high potassium levels while in the hospital. 





You may resume taking Aspirin in 3 days. 





You have been prescribed the antibiotic cefuroxime. You have been prescribed 2 

doses. Please finish all as directed.





Current Hospital Diet


Patient's current hospital diet: Regular Diet





Discharge Diet


Recommended Diet:  Regular Diet





Procedures


Procedures Performed:  


Transurethral Resection of the Prostate and Stent Exchange





Pending Studies


Studies pending at discharge:  yes (prostate tissue )


List of pending studies:  


prostate tissue





Medical Emergencies








.


Who to Call and When:





Medical Emergencies:  If at any time you feel your situation is an emergency, 

please call 911 immediately.





.





Non-Emergent Contact


Non-Emergency issues call your:  Urologist


Call Non-Emergent contact if:  temperature is above 101.5, your pain is not 

controlled, your pain is worsening, your pain is unusual for you, your pain is 

concerning you, you have any medication questions





.


.








"Provider Documentation" section prepared by Jackie Mack.








.





[place] Drug Monitoring Program


Search Results:  patient reviewed within database, see additional documentation 

(history of multiple tramadol prescriptions in 2017; nothing since December 2017

)

## 2018-04-24 NOTE — ANESTHESIOLOGY PROGRESS NOTE
Anesthesia Post Op Note


Date & Time


Apr 24, 2018 at 08:06





Vital Signs


Pain Intensity:  0.0





Vital Signs Past 12 Hours








  Date Time  Temp Pulse Resp B/P (MAP) Pulse Ox O2 Delivery O2 Flow Rate FiO2


 


4/24/18 07:03 36.6 73 18 174/94 (120) 98 Room Air  


 


4/24/18 02:40 36.7 75 14 156/84 (108) 97 Room Air  


 


4/23/18 23:30     97 Room Air 2.0 


 


4/23/18 23:08 36.5 82 14 159/84 (109) 97 Room Air  


 


4/23/18 22:45    175/89 (117)    


 


4/23/18 21:34  83  171/77 (108)    











Notes


Mental Status:  alert / awake / arousable, participated in evaluation


Pt Amnestic to Procedure:  Yes


Nausea / Vomiting:  adequately controlled


Pain:  adequately controlled


Airway Patency, RR, SpO2:  stable & adequate


BP & HR:  stable & adequate


Hydration State:  stable & adequate


Anesthetic Complications:  no major complications apparent


pt states he was told he was combative coming out of anesthesia/PACU.

## 2018-04-24 NOTE — PROGRESS NOTE
Progress Note


Date of Service


Apr 24, 2018.





Progress Note


Pt subjectively feeling very well this AM


- denies pain


- slept well


- tolerating his diet





O: 


4/24/18 06:55








Red Blood Count 3.50, Mean Corpuscular Volume 88.3, Mean Corpuscular Hemoglobin 

28.3, Mean Corpuscular Hemoglobin Concent 32.0, Mean Platelet Volume 9.1, 

Neutrophils (%) (Auto) 79.2, Lymphocytes (%) (Auto) 12.0, Monocytes (%) (Auto) 

6.0, Eosinophils (%) (Auto) 0.2, Basophils (%) (Auto) 0.2, Neutrophils # (Auto) 

9.78, Lymphocytes # (Auto) 1.49, Monocytes # (Auto) 0.74, Eosinophils # (Auto) 

0.03, Basophils # (Auto) 0.03





4/24/18 06:55

















Test


  4/24/18


06:55


 


White Blood Count


  12.37 K/uL


(4.8-10.8)


 


Red Blood Count


  3.50 M/uL


(4.7-6.1)


 


Hemoglobin


  9.9 g/dL


(14.0-18.0)


 


Hematocrit 30.9 % (42-52) 


 


Mean Corpuscular Volume


  88.3 fL


()


 


Mean Corpuscular Hemoglobin


  28.3 pg


(25-34)


 


Mean Corpuscular Hemoglobin


Concent 32.0 g/dl


(32-36)


 


Platelet Count


  250 K/uL


(130-400)


 


Mean Platelet Volume


  9.1 fL


(7.4-10.4)


 


Neutrophils (%) (Auto) 79.2 % 


 


Lymphocytes (%) (Auto) 12.0 % 


 


Monocytes (%) (Auto) 6.0 % 


 


Eosinophils (%) (Auto) 0.2 % 


 


Basophils (%) (Auto) 0.2 % 


 


Neutrophils # (Auto)


  9.78 K/uL


(1.4-6.5)


 


Lymphocytes # (Auto)


  1.49 K/uL


(1.2-3.4)


 


Monocytes # (Auto)


  0.74 K/uL


(0.11-0.59)


 


Eosinophils # (Auto)


  0.03 K/uL


(0-0.5)


 


Basophils # (Auto)


  0.03 K/uL


(0-0.2)


 


RDW Standard Deviation


  60.5 fL


(36.4-46.3)


 


RDW Coefficient of Variation


  18.6 %


(11.5-14.5)


 


Immature Granulocyte % (Auto) 2.4 % 


 


Immature Granulocyte # (Auto)


  0.30 K/uL


(0.00-0.02)


 


Anion Gap


  7.0 mmol/L


(3-11)


 


Est Creatinine Clear Calc


Drug Dose 19.0 ml/min 


 


 


Estimated GFR (


American) 23.8 


 


 


Estimated GFR (Non-


American 20.5 


 


 


BUN/Creatinine Ratio 13.2 (10-20) 


 


Calcium Level


  8.7 mg/dl


(8.5-10.1)








Vital Signs Past 12 Hours








  Date Time  Temp Pulse Resp B/P (MAP) Pulse Ox O2 Delivery O2 Flow Rate FiO2


 


4/24/18 07:40      Room Air  


 


4/24/18 07:03 36.6 73 18 174/94 (120) 98 Room Air  


 


4/24/18 02:40 36.7 75 14 156/84 (108) 97 Room Air  


 


4/23/18 23:30     97 Room Air 2.0 


 


4/23/18 23:08 36.5 82 14 159/84 (109) 97 Room Air  


 


4/23/18 22:45    175/89 (117)    


 


4/23/18 21:34  83  171/77 (108)    





NAD


- slight trouble maintaining his thoughts this AM


no resp distress


moving all extremities


urine relatively clear





A/P: POD #1 s/p TURP and left ureteral stent exchange


- very large prostate


- retention for several months


- CKD - no significant change from pre-op to present


- adjusted meds, etc yesterday secondary to hyperkalemia


- hope that there is some improvement overall from relief of obstruction


- attempt a voiding trial this AM


- if he does well, plan for d/c home and f/u next week as an outpt

## 2018-05-03 ENCOUNTER — HOSPITAL ENCOUNTER (OUTPATIENT)
Dept: HOSPITAL 45 - C.LABMFLN | Age: 78
Discharge: HOME | End: 2018-05-03
Attending: INTERNAL MEDICINE
Payer: COMMERCIAL

## 2018-05-03 DIAGNOSIS — N17.9: Primary | ICD-10-CM

## 2018-05-03 LAB
ALBUMIN SERPL-MCNC: 2.7 GM/DL (ref 3.4–5)
BUN SERPL-MCNC: 38 MG/DL (ref 7–18)
CALCIUM SERPL-MCNC: 8.8 MG/DL (ref 8.5–10.1)
CO2 SERPL-SCNC: 25 MMOL/L (ref 21–32)
CREAT SERPL-MCNC: 1.95 MG/DL (ref 0.6–1.4)
EOSINOPHIL NFR BLD AUTO: 267 K/UL (ref 130–400)
GLUCOSE SERPL-MCNC: 186 MG/DL (ref 70–99)
HCT VFR BLD CALC: 27.4 % (ref 42–52)
HGB BLD-MCNC: 8.6 G/DL (ref 14–18)
MCH RBC QN AUTO: 28.7 PG (ref 25–34)
MCHC RBC AUTO-ENTMCNC: 31.4 G/DL (ref 32–36)
MCV RBC AUTO: 91.3 FL (ref 80–100)
PHOSPHATE SERPL-MCNC: 3 MG/DL (ref 2.5–4.9)
PMV BLD AUTO: 9.8 FL (ref 7.4–10.4)
POTASSIUM SERPL-SCNC: 4.1 MMOL/L (ref 3.5–5.1)
RED CELL DISTRIBUTION WIDTH CV: 18 % (ref 11.5–14.5)
RED CELL DISTRIBUTION WIDTH SD: 60.3 FL (ref 36.4–46.3)
SODIUM SERPL-SCNC: 140 MMOL/L (ref 136–145)
WBC # BLD AUTO: 11.87 K/UL (ref 4.8–10.8)

## 2018-05-07 ENCOUNTER — HOSPITAL ENCOUNTER (OUTPATIENT)
Dept: HOSPITAL 45 - C.LAB | Age: 78
Discharge: HOME | End: 2018-05-07
Attending: FAMILY MEDICINE
Payer: COMMERCIAL

## 2018-05-07 DIAGNOSIS — D50.0: Primary | ICD-10-CM

## 2018-05-07 DIAGNOSIS — R31.0: ICD-10-CM

## 2018-05-07 LAB
BASOPHILS # BLD: 0.02 K/UL (ref 0–0.2)
BASOPHILS NFR BLD: 0.2 %
EOS ABS #: 0.25 K/UL (ref 0–0.5)
EOSINOPHIL NFR BLD AUTO: 275 K/UL (ref 130–400)
HCT VFR BLD CALC: 26.6 % (ref 42–52)
HGB BLD-MCNC: 8.5 G/DL (ref 14–18)
IG#: 0.21 K/UL (ref 0–0.02)
IMM GRANULOCYTES NFR BLD AUTO: 18 %
LYMPHOCYTES # BLD: 1.92 K/UL (ref 1.2–3.4)
MCH RBC QN AUTO: 29 PG (ref 25–34)
MCHC RBC AUTO-ENTMCNC: 32 G/DL (ref 32–36)
MCV RBC AUTO: 90.8 FL (ref 80–100)
MONO ABS #: 0.59 K/UL (ref 0.11–0.59)
MONOCYTES NFR BLD: 5.5 %
NEUT ABS #: 7.68 K/UL (ref 1.4–6.5)
NEUTROPHILS # BLD AUTO: 2.3 %
NEUTROPHILS NFR BLD AUTO: 72 %
PMV BLD AUTO: 9.8 FL (ref 7.4–10.4)
RED CELL DISTRIBUTION WIDTH CV: 17.8 % (ref 11.5–14.5)
RED CELL DISTRIBUTION WIDTH SD: 59.1 FL (ref 36.4–46.3)
WBC # BLD AUTO: 10.67 K/UL (ref 4.8–10.8)

## 2018-07-17 ENCOUNTER — HOSPITAL ENCOUNTER (OUTPATIENT)
Dept: HOSPITAL 45 - C.LAB | Age: 78
Discharge: HOME | End: 2018-07-17
Attending: ORTHOPAEDIC SURGERY
Payer: COMMERCIAL

## 2018-07-17 VITALS
WEIGHT: 145.31 LBS | BODY MASS INDEX: 24.21 KG/M2 | WEIGHT: 145.31 LBS | BODY MASS INDEX: 24.21 KG/M2 | BODY MASS INDEX: 24.21 KG/M2 | HEIGHT: 65 IN | HEIGHT: 65 IN

## 2018-07-17 DIAGNOSIS — Z01.818: Primary | ICD-10-CM

## 2018-07-27 ENCOUNTER — HOSPITAL ENCOUNTER (OUTPATIENT)
Dept: HOSPITAL 45 - C.ULTRBC | Age: 78
Discharge: HOME | End: 2018-07-27
Attending: UROLOGY
Payer: COMMERCIAL

## 2018-07-27 DIAGNOSIS — N13.30: Primary | ICD-10-CM

## 2018-07-27 NOTE — DIAGNOSTIC IMAGING REPORT
EXAMINATION: RENAL ULTRASOUND



CLINICAL HISTORY: HYDRONEPHROSIS    



COMPARISON STUDY:  7/2/2018



FINDINGS: The lower pole the right kidney was poorly visualized. There is a 4 mm

mid pole right renal cyst. There is no right-sided hydronephrosis.



There is moderate left-sided hydronephrosis. This remain similar to the

preceding examination. There is a 3.3 cm complex midpole left renal septated

lesion.



There is bladder wall thickening and irregularity. A right ureteral jet was

visualized. The left ureteral jet was not identified.

                 

IMPRESSION : 

1. Persistent moderate left-sided hydronephrosis

2. Indeterminate 3.3 cm complex mid pole left renal septated lesion

3. Nonvisualization the left ureteral jet

4. Mild lobular bladder wall thickening







Electronically signed by:  Edgar Coleman M.D.

7/27/2018 3:29 PM



Dictated Date/Time:  7/27/2018 3:25 PM

## 2018-07-27 NOTE — CODING QUERY NO DIAGNOSIS
TREATMENT RENDERED WITHOUT A DIAGNOSIS                                                  

 40



To promote full compliance with coding requirements relating to patient care, physician 
participation is requested in all cases of  uncertainty.  Please assist us with 
providing a diagnosis/symptom for the test(s) below:



A diagnosis/symptom was not documented on your Order.  A valid diagnosis/symptom is 
required to bill all insurances.



**Please remember that we are unable to code a diagnosis of rule out, probable, possible, 
questionable, or suspected.  





DOS 18

Tests that require a diagnosis:

* TYPE/SCREEN PROFILE             DIAGNOSIS:







Provider Signature: _____________________________ Date: _________



Thank you  

Michaela Ackerman

Health Information Management

Phone:  [phone]

Fax:  [phone]



Once completed, please kindly fax back to [phone]



For questions please call [phone]

## 2018-07-30 ENCOUNTER — HOSPITAL ENCOUNTER (OUTPATIENT)
Dept: HOSPITAL 45 - C.LABMFLN | Age: 78
Discharge: HOME | End: 2018-07-30
Attending: INTERNAL MEDICINE
Payer: COMMERCIAL

## 2018-07-30 DIAGNOSIS — E55.9: Primary | ICD-10-CM

## 2018-07-30 LAB
ALBUMIN SERPL-MCNC: 3.3 GM/DL (ref 3.4–5)
BUN SERPL-MCNC: 38 MG/DL (ref 7–18)
CALCIUM SERPL-MCNC: 8.9 MG/DL (ref 8.5–10.1)
CO2 SERPL-SCNC: 23 MMOL/L (ref 21–32)
CREAT SERPL-MCNC: 2.15 MG/DL (ref 0.6–1.4)
EOSINOPHIL NFR BLD AUTO: 216 K/UL (ref 130–400)
GLUCOSE SERPL-MCNC: 90 MG/DL (ref 70–99)
HCT VFR BLD CALC: 33.4 % (ref 42–52)
HGB BLD-MCNC: 10.5 G/DL (ref 14–18)
MCH RBC QN AUTO: 28.8 PG (ref 25–34)
MCHC RBC AUTO-ENTMCNC: 31.4 G/DL (ref 32–36)
MCV RBC AUTO: 91.5 FL (ref 80–100)
PHOSPHATE SERPL-MCNC: 2.8 MG/DL (ref 2.5–4.9)
PMV BLD AUTO: 10.6 FL (ref 7.4–10.4)
POTASSIUM SERPL-SCNC: 4.1 MMOL/L (ref 3.5–5.1)
RED CELL DISTRIBUTION WIDTH CV: 15.5 % (ref 11.5–14.5)
RED CELL DISTRIBUTION WIDTH SD: 52.8 FL (ref 36.4–46.3)
SODIUM SERPL-SCNC: 139 MMOL/L (ref 136–145)
WBC # BLD AUTO: 7.69 K/UL (ref 4.8–10.8)

## 2018-08-14 NOTE — HISTORY & PHYSICAL EXAMINATION
DATE OF ADMISSION:  08/15/2018

 

CHIEF COMPLAINT:  Chronic right knee pain.

 

HISTORY OF PRESENT ILLNESS:  This is a 78-year-old male patient of Dr. Cooper's complaining of chronic right knee pain, longstanding, now

progressively getting worse.  The patient has failed conservative treatment

including intraarticular injections and the use of a cane or walker as well

as the use of over-the-counter Tylenol.  The patient has increased pain with

weightbearing activities and his pain does interfere with his activities of

daily living.

 

PAST MEDICAL HISTORY:  Heart murmur, anemia, rheumatoid arthritis,

osteoarthritis, BPH, kidney failure, kidney stones.

 

SOCIAL HISTORY:  Nonsmoker, nondrinker.

 

PAST SURGICAL HISTORY:  Carpal tunnel, vocal cord nodule, right total hip and

hernia, both shoulders and a left knee replacement.

 

REVIEW OF SYSTEMS:  Chronic right knee pain, otherwise denies any shortness

of breath, chest pain, nausea, vomiting, or any other joint complaints.

 

FAMILY HISTORY:  Noncontributory.

 

MEDICATIONS:  Folic acid 1 mg daily, vitamin C 500 mg daily, a multivitamin

daily, Tylenol 325 mg as needed, vitamin D3 1000 units twice daily,

prednisone 5 mg daily, Colace 100 mg at bedtime, ferrous sulfate 325 mg

daily, ranitidine 150 mg twice daily, Cardizem 180 mg daily, metoprolol 25 mg

daily.

 

ALLERGIES:  No known drug allergies.

 

PHYSICAL EXAMINATION:

GENERAL:  Well-developed, well-nourished 78-year-old male in no acute

distress.  He is alert and oriented x3 and pleasant.

HEENT:  Normocephalic, atraumatic.  Extraocular motions are intact.  Pupils

are equal and reactive to light.

HEART:  Regular rate and rhythm.  No murmurs appreciated.

LUNGS:  Clear.

ABDOMEN:  Soft and nontender.  Bowel sounds are present.

EXTREMITIES:  Right knee:  He has a mild effusion with negative 

degrees of motion.  There is a varus deformity with medial joint line

tenderness.  He has 5/5 strength.

NEUROLOGICAL:  Neurovascularly, he is intact in his right lower extremity.

 

DIAGNOSES:  Right knee end-stage osteoarthritis, history of a heart murmur,

anemia, rheumatoid arthritis, osteoarthritis, kidney stones, kidney failure,

and benign prostatic hypertrophy.

 

PLAN:  The patient was advised of his diagnosis.  Indications, risks,

benefits, postop course have all been reviewed.  The patient wished to

proceed with a right total knee arthroplasty.  Necessary consent forms,

preoperative testing, and clearances will be obtained.

## 2018-08-15 ENCOUNTER — HOSPITAL ENCOUNTER (INPATIENT)
Dept: HOSPITAL 45 - C.ACU | Age: 78
LOS: 2 days | Discharge: HOME HEALTH | DRG: 470 | End: 2018-08-17
Attending: ORTHOPAEDIC SURGERY | Admitting: ORTHOPAEDIC SURGERY
Payer: COMMERCIAL

## 2018-08-15 VITALS
TEMPERATURE: 97.52 F | OXYGEN SATURATION: 97 % | SYSTOLIC BLOOD PRESSURE: 161 MMHG | HEART RATE: 63 BPM | DIASTOLIC BLOOD PRESSURE: 89 MMHG

## 2018-08-15 VITALS
BODY MASS INDEX: 23.4 KG/M2 | BODY MASS INDEX: 23.4 KG/M2 | HEIGHT: 65 IN | WEIGHT: 140.43 LBS | HEIGHT: 65 IN | BODY MASS INDEX: 23.4 KG/M2 | WEIGHT: 140.43 LBS

## 2018-08-15 VITALS
HEART RATE: 73 BPM | SYSTOLIC BLOOD PRESSURE: 165 MMHG | DIASTOLIC BLOOD PRESSURE: 88 MMHG | OXYGEN SATURATION: 98 % | TEMPERATURE: 97.34 F

## 2018-08-15 VITALS
DIASTOLIC BLOOD PRESSURE: 93 MMHG | SYSTOLIC BLOOD PRESSURE: 148 MMHG | OXYGEN SATURATION: 99 % | HEART RATE: 63 BPM | TEMPERATURE: 97.88 F

## 2018-08-15 VITALS
TEMPERATURE: 97.7 F | OXYGEN SATURATION: 99 % | SYSTOLIC BLOOD PRESSURE: 152 MMHG | HEART RATE: 66 BPM | DIASTOLIC BLOOD PRESSURE: 75 MMHG

## 2018-08-15 VITALS
SYSTOLIC BLOOD PRESSURE: 139 MMHG | TEMPERATURE: 97.7 F | DIASTOLIC BLOOD PRESSURE: 72 MMHG | HEART RATE: 74 BPM | OXYGEN SATURATION: 96 %

## 2018-08-15 VITALS
SYSTOLIC BLOOD PRESSURE: 161 MMHG | HEART RATE: 59 BPM | DIASTOLIC BLOOD PRESSURE: 85 MMHG | OXYGEN SATURATION: 95 % | TEMPERATURE: 97.52 F

## 2018-08-15 VITALS
SYSTOLIC BLOOD PRESSURE: 149 MMHG | DIASTOLIC BLOOD PRESSURE: 83 MMHG | HEART RATE: 72 BPM | OXYGEN SATURATION: 98 % | TEMPERATURE: 97.52 F

## 2018-08-15 VITALS
SYSTOLIC BLOOD PRESSURE: 138 MMHG | HEART RATE: 72 BPM | TEMPERATURE: 97.7 F | DIASTOLIC BLOOD PRESSURE: 69 MMHG | OXYGEN SATURATION: 97 %

## 2018-08-15 DIAGNOSIS — F05: ICD-10-CM

## 2018-08-15 DIAGNOSIS — Z86.79: ICD-10-CM

## 2018-08-15 DIAGNOSIS — K21.9: ICD-10-CM

## 2018-08-15 DIAGNOSIS — M85.80: ICD-10-CM

## 2018-08-15 DIAGNOSIS — Z87.442: ICD-10-CM

## 2018-08-15 DIAGNOSIS — Z79.899: ICD-10-CM

## 2018-08-15 DIAGNOSIS — Z96.641: ICD-10-CM

## 2018-08-15 DIAGNOSIS — I12.9: ICD-10-CM

## 2018-08-15 DIAGNOSIS — D50.9: ICD-10-CM

## 2018-08-15 DIAGNOSIS — Z96.652: ICD-10-CM

## 2018-08-15 DIAGNOSIS — R00.1: ICD-10-CM

## 2018-08-15 DIAGNOSIS — Z79.52: ICD-10-CM

## 2018-08-15 DIAGNOSIS — Z96.611: ICD-10-CM

## 2018-08-15 DIAGNOSIS — M06.9: ICD-10-CM

## 2018-08-15 DIAGNOSIS — Z87.891: ICD-10-CM

## 2018-08-15 DIAGNOSIS — Z96.612: ICD-10-CM

## 2018-08-15 DIAGNOSIS — D63.8: ICD-10-CM

## 2018-08-15 DIAGNOSIS — Z88.8: ICD-10-CM

## 2018-08-15 DIAGNOSIS — N40.0: ICD-10-CM

## 2018-08-15 DIAGNOSIS — M17.11: Primary | ICD-10-CM

## 2018-08-15 DIAGNOSIS — N18.4: ICD-10-CM

## 2018-08-15 DIAGNOSIS — Z87.440: ICD-10-CM

## 2018-08-15 PROCEDURE — 0SRC0J9 REPLACEMENT OF RIGHT KNEE JOINT WITH SYNTHETIC SUBSTITUTE, CEMENTED, OPEN APPROACH: ICD-10-PCS | Performed by: ORTHOPAEDIC SURGERY

## 2018-08-15 RX ADMIN — CEFAZOLIN SCH MLS/MIN: 10 INJECTION, POWDER, FOR SOLUTION INTRAVENOUS at 15:46

## 2018-08-15 RX ADMIN — HYDROCORTISONE SODIUM SUCCINATE SCH MLS/MIN: 100 INJECTION, POWDER, FOR SOLUTION INTRAMUSCULAR; INTRAVENOUS at 15:46

## 2018-08-15 RX ADMIN — Medication SCH MG: at 22:07

## 2018-08-15 RX ADMIN — FERROUS SULFATE TAB EC 325 MG (65 MG FE EQUIVALENT) SCH MG: 325 (65 FE) TABLET DELAYED RESPONSE at 22:03

## 2018-08-15 RX ADMIN — HYDROCORTISONE SODIUM SUCCINATE SCH MLS/MIN: 100 INJECTION, POWDER, FOR SOLUTION INTRAMUSCULAR; INTRAVENOUS at 23:36

## 2018-08-15 RX ADMIN — DOCUSATE SODIUM SCH MG: 100 CAPSULE, LIQUID FILLED ORAL at 22:05

## 2018-08-15 RX ADMIN — TRANEXAMIC ACID SCH MLS/HR: 100 INJECTION, SOLUTION INTRAVENOUS at 10:10

## 2018-08-15 RX ADMIN — TRANEXAMIC ACID SCH MLS/HR: 100 INJECTION, SOLUTION INTRAVENOUS at 14:22

## 2018-08-15 RX ADMIN — CEFAZOLIN SCH MLS/MIN: 10 INJECTION, POWDER, FOR SOLUTION INTRAVENOUS at 23:36

## 2018-08-15 RX ADMIN — STANDARDIZED SENNA CONCENTRATE SCH MG: 8.6 TABLET ORAL at 22:03

## 2018-08-15 RX ADMIN — ACETAMINOPHEN SCH MG: 500 TABLET, COATED ORAL at 22:05

## 2018-08-15 RX ADMIN — DEXTROSE MONOHYDRATE, SODIUM CHLORIDE, AND POTASSIUM CHLORIDE SCH MLS/HR: 50; 4.5; 1.49 INJECTION, SOLUTION INTRAVENOUS at 20:00

## 2018-08-15 RX ADMIN — ACETAMINOPHEN SCH MG: 500 TABLET, COATED ORAL at 14:59

## 2018-08-15 NOTE — DIAGNOSTIC IMAGING REPORT
TWO VIEWS RIGHT KNEE



CLINICAL HISTORY:  Postoperative examination.



FINDINGS: AP and crosstable lateral portable views of the right knee are

obtained. A right knee arthroplasty is in near anatomic alignment. There has

been undersurface remodeling of the patella. No acute fracture is seen. There

are expected postoperative changes around the knee including skin clips, a

surgical drain, soft tissue edema, and subcutaneous gas.



IMPRESSION: Expected postoperative changes status post right knee arthroplasty.

No acute fracture is seen.







Electronically signed by:  Arnaldo Handy M.D.

8/15/2018 1:27 PM



Dictated Date/Time:  8/15/2018 1:27 PM

## 2018-08-15 NOTE — [facility] OPERATIVE REPORT
Operative Report


Operative Date


Aug 15, 2018.





Pre-Operative Diagnosis





Right Knee End-Stage Osteoarthritis and rheumatoid arthritis





Post-Operative Diagnosis


Same





Procedure(s) Performed


Right total knee arthroplasty





Surgeon


Dr Cooper





Assistant Surgeon(s)


Joaquin Smith Pa-C





Estimated Blood Loss


5cc





Findings


Tricompartmental DJD grade 4 bone-on-bone medial compartment grade 4 

patellofemoral joint evidence of osteo-and rheumatoid arthritis





Specimens





A: Right Knee Bone and Tissue





Drains


2 hemovac





Anesthesia


Spinal sedation adductor nerve block and orthomix





Complication(s)


None





Disposition


Recovery Room / PACU





Indications


End-stage right knee osteoarthritis and rheumatoid arthritis bone-on-bone 

medial compartment and patellofemoral joint history of successful left knee 

replacement.





Description of Procedure


The patient was taken to the operating room and anesthetized under spinal 

sedation adductor nerve block.  Patient was placed supine on the the operating 

table.  A pneumatic tourniquet was placed about the right upper thigh.  The 

knee exam demonstrated good range of motion slight flexion contracture only, 

flexion to 145, varus knee bone-on-bone crepitation and moderately large 

effusion.  The involved leg was elevated exsanguinated with Esmarch bandage and 

the pneumatic tourniquet was raised to 300 millimeters mercury.  A longitudinal 

incision was made across the anterior knee.  Skin flaps were elevated.  An 

incision was made into the medial retinaculum and extended up into the mid 

third of the quadriceps tendon and extended down to the tibial tubercle.  Intra-

articular findings demonstrated tricompartmental DJD osteoarthritis and 

rheumatoid arthritis with grade 4 patellofemoral DJD lateral patellofemoral 

joint and grade 4 medial compartment DJD but tricompartmental osteoarthritis.  

The knee was exposed by excising cruciate ligaments and menisci.  The 

infrapatellar fat pad was resected.  The fat pad over the anterior femur at the 

upper aspect of the articular surface was resected for placement of the 

component in that area.  A subperiosteal peel lateral release was performed 

around the patella.  To balance the knee had to perform subperiosteal and 

posterior medial releases around the medial side and piecrust the MCL.





The [employer] journey 2.0 total knee arthroplasty system was utilized for 

the procedure.  The custom femoral cutting guide was pinned in position.  The 

distal femoral cut was made.  The size 6 femoral, 5 in 1 cutting block was 

placed.  The anterior posterior and chamfer cuts were made.  The knee was 

extended and a free hand cut technique was performed to the patella.  The 

patella with was measured and the width was reproduced using a 38 symmetrical 

patella component.  3 drill holes are made for the patella component pegs.  The 

tibia was then subluxed.  The custom tibial cutting block was pinned in 

position and the proximal tibial cut was made with the oscillating saw.  The 

size 5 tibial trial was externally rotated in line with the tibial tubercle and 

pinned in position.  The punch for the stem was used.  The femoral trial was 

inserted and centered the notch cutting devices were used and the collet was 

placed.  Tibial trials were used for the insert.  The size 15 trial gave 

balanced ligaments through full range of motion.


Patella tracking was assessed with range of motion.  The patella tracked 

centrally.  The trials were removed.  The Orthomix anesthetic cocktail was 

injected per protocol.  The cut bone surfaces and soft tissue were copiously 

irrigated with antibiotic solution with bacitracin.  The final components were 

cemented with Simplex cement.  The final components were [employer] journey 

2.0 size 6 right femur, 5 tibial baseplate, 15 high flex poly-posterior 

stabilized insert, 38 symmetrical patella.  While the cement cured the Betadine 

soak was used per protocol.  When the cement cured the knee was copiously 

irrigated with pulsatile lavage antibiotic solution with bacitracin.  2 drains 

were brought out laterally connected to Hemovac.  The quadriceps tendon and 

medial retinaculum were closed with interrupted figure-of-eight #1 Vicryl 

sutures.  The knee was taken through full range of motion and repair was 

secure.  The subcutaneous tissues were closed with 2-0 Vicryl sutures.  The 

skin was closed with staples.  A sterile Silverlon dressing was applied.  The 

tourniquet was let down and the patient had good capillary refill to the 

extremity.  The patient tolerated the procedure well.  My physician assistant 

Joaquin Smith assisted in the procedure including prepping draping leg 

positioning soft tissue retraction instrument management and assisted in the 

closure ,dressings application and will participate in postoperative care the 

patient.


I attest to the content of the Intraoperative Record and any orders documented 

therein.  Any exceptions are noted below.

## 2018-08-15 NOTE — [facility] POST OPERATIVE BRIEF NOTE
Immediate Operative Summary


Operative Date


Aug 15, 2018.





Pre-Operative Diagnosis





Right Knee End-Stage Osteoarthritis and rheumatoid arthritis





Post-Operative Diagnosis





Right Knee End-Stage Osteoarthritis and rheumatoid arthritis





Procedure(s) Performed





Right Total Knee Arthroplasty





Surgeon


Dr Cooper





Assistant Surgeon(s)


Joaquin Smith Pa-C





Estimated Blood Loss


5cc





Findings


Consistent with Post-Op Diagnosis





Specimens





A: Right Knee Bone and Tissue





Drains


2 hemovac





Anesthesia Type


MAC Spinal Regional





Complication(s)


none





Disposition


Accompanied Pt To Recover:  no


Disposition:  Recovery Room / PACU





Overlapping Procedure


I was immediately available:  during the entire case

## 2018-08-16 VITALS — HEART RATE: 71 BPM | DIASTOLIC BLOOD PRESSURE: 74 MMHG | SYSTOLIC BLOOD PRESSURE: 168 MMHG

## 2018-08-16 VITALS
SYSTOLIC BLOOD PRESSURE: 151 MMHG | OXYGEN SATURATION: 98 % | DIASTOLIC BLOOD PRESSURE: 76 MMHG | HEART RATE: 45 BPM | TEMPERATURE: 97.88 F

## 2018-08-16 VITALS
OXYGEN SATURATION: 96 % | TEMPERATURE: 97.88 F | HEART RATE: 81 BPM | SYSTOLIC BLOOD PRESSURE: 152 MMHG | DIASTOLIC BLOOD PRESSURE: 77 MMHG

## 2018-08-16 VITALS
TEMPERATURE: 97.88 F | HEART RATE: 70 BPM | OXYGEN SATURATION: 96 % | DIASTOLIC BLOOD PRESSURE: 88 MMHG | SYSTOLIC BLOOD PRESSURE: 153 MMHG

## 2018-08-16 VITALS
OXYGEN SATURATION: 97 % | HEART RATE: 60 BPM | TEMPERATURE: 98.06 F | DIASTOLIC BLOOD PRESSURE: 87 MMHG | SYSTOLIC BLOOD PRESSURE: 182 MMHG

## 2018-08-16 VITALS
HEART RATE: 46 BPM | TEMPERATURE: 97.52 F | DIASTOLIC BLOOD PRESSURE: 84 MMHG | SYSTOLIC BLOOD PRESSURE: 170 MMHG | OXYGEN SATURATION: 99 %

## 2018-08-16 VITALS — OXYGEN SATURATION: 100 % | HEART RATE: 70 BPM | SYSTOLIC BLOOD PRESSURE: 152 MMHG | DIASTOLIC BLOOD PRESSURE: 79 MMHG

## 2018-08-16 VITALS — HEART RATE: 48 BPM

## 2018-08-16 LAB
BUN SERPL-MCNC: 42 MG/DL (ref 7–18)
CALCIUM SERPL-MCNC: 8.8 MG/DL (ref 8.5–10.1)
CO2 SERPL-SCNC: 23 MMOL/L (ref 21–32)
CREAT SERPL-MCNC: 2.28 MG/DL (ref 0.6–1.4)
EOSINOPHIL NFR BLD AUTO: 246 K/UL (ref 130–400)
GLUCOSE SERPL-MCNC: 155 MG/DL (ref 70–99)
HCT VFR BLD CALC: 32 % (ref 42–52)
HGB BLD-MCNC: 10.1 G/DL (ref 14–18)
MCH RBC QN AUTO: 28.6 PG (ref 25–34)
MCHC RBC AUTO-ENTMCNC: 31.6 G/DL (ref 32–36)
MCV RBC AUTO: 90.7 FL (ref 80–100)
PMV BLD AUTO: 10.4 FL (ref 7.4–10.4)
POTASSIUM SERPL-SCNC: 4.8 MMOL/L (ref 3.5–5.1)
RED CELL DISTRIBUTION WIDTH CV: 15.1 % (ref 11.5–14.5)
RED CELL DISTRIBUTION WIDTH SD: 50.3 FL (ref 36.4–46.3)
SODIUM SERPL-SCNC: 137 MMOL/L (ref 136–145)
WBC # BLD AUTO: 16.01 K/UL (ref 4.8–10.8)

## 2018-08-16 RX ADMIN — OXYCODONE HYDROCHLORIDE AND ACETAMINOPHEN SCH MG: 500 TABLET ORAL at 09:27

## 2018-08-16 RX ADMIN — TRAMADOL HYDROCHLORIDE PRN MG: 50 TABLET, COATED ORAL at 16:26

## 2018-08-16 RX ADMIN — ACETAMINOPHEN SCH MG: 500 TABLET, COATED ORAL at 20:53

## 2018-08-16 RX ADMIN — Medication SCH MG: at 09:28

## 2018-08-16 RX ADMIN — DOCUSATE SODIUM SCH MG: 100 CAPSULE, LIQUID FILLED ORAL at 09:27

## 2018-08-16 RX ADMIN — HYDROCORTISONE SODIUM SUCCINATE SCH MLS/MIN: 100 INJECTION, POWDER, FOR SOLUTION INTRAMUSCULAR; INTRAVENOUS at 10:45

## 2018-08-16 RX ADMIN — FERROUS SULFATE TAB EC 325 MG (65 MG FE EQUIVALENT) SCH MG: 325 (65 FE) TABLET DELAYED RESPONSE at 20:52

## 2018-08-16 RX ADMIN — Medication SCH MG: at 20:52

## 2018-08-16 RX ADMIN — METOPROLOL SUCCINATE SCH MG: 25 TABLET, EXTENDED RELEASE ORAL at 09:00

## 2018-08-16 RX ADMIN — FERROUS SULFATE TAB EC 325 MG (65 MG FE EQUIVALENT) SCH MG: 325 (65 FE) TABLET DELAYED RESPONSE at 09:21

## 2018-08-16 RX ADMIN — DEXTROSE MONOHYDRATE, SODIUM CHLORIDE, AND POTASSIUM CHLORIDE SCH MLS/HR: 50; 4.5; 1.49 INJECTION, SOLUTION INTRAVENOUS at 05:38

## 2018-08-16 RX ADMIN — STANDARDIZED SENNA CONCENTRATE SCH MG: 8.6 TABLET ORAL at 20:52

## 2018-08-16 RX ADMIN — ACETAMINOPHEN SCH MG: 500 TABLET, COATED ORAL at 05:39

## 2018-08-16 RX ADMIN — DILTIAZEM HYDROCHLORIDE SCH MG: 180 CAPSULE, EXTENDED RELEASE ORAL at 09:00

## 2018-08-16 RX ADMIN — ACETAMINOPHEN SCH MG: 500 TABLET, COATED ORAL at 13:12

## 2018-08-16 RX ADMIN — Medication SCH TAB: at 09:26

## 2018-08-16 RX ADMIN — DOCUSATE SODIUM SCH MG: 100 CAPSULE, LIQUID FILLED ORAL at 20:52

## 2018-08-16 RX ADMIN — DILTIAZEM HYDROCHLORIDE SCH MG: 180 CAPSULE, EXTENDED RELEASE ORAL at 09:27

## 2018-08-16 NOTE — ORTHOPEDIC PROGRESS NOTE
Orthopedic Progress Note


Date of Service


Aug 16, 2018.





Subjective


Post OP Day:  1


Reports: feeling well, Denies: complaints


Additional Notes:


Pt had EKG this AM due to bradycardia (45).  Pt states that his heart rate is 

normally slow.  Denies LH, SOB, CP, vertigo.  Pain controlled presently.





Objective


calves soft nontender, N/V intact, dressing C/D/I, A&O x3, toes mobile, hemovac 

drainage (150ml latest shift)











  Date Time  Temp Pulse Resp B/P (MAP) Pulse Ox O2 Delivery O2 Flow Rate FiO2


 


8/16/18 07:41 36.6 45 16 151/76 (101) 98 Room Air  


 


8/16/18 02:53 36.6 81 16 152/77 (102) 96 Room Air  


 


8/15/18 23:40      Room Air  


 


8/15/18 23:01 36.4 63 18 161/89 (113) 97 Room Air  


 


8/15/18 19:30 36.4 72 17 149/83 (105) 98 Room Air  


 


8/15/18 16:50 36.5 74 17 139/72 (94) 96 Room Air  


 


8/15/18 15:54      Room Air  


 


8/15/18 15:50 36.5 72 17 138/69 (92) 97 Room Air  


 


8/15/18 14:57 36.3 73 17 165/88 (113) 98 Room Air  


 


8/15/18 14:25 36.4 59 18 161/85 (110) 95 Room Air  


 


8/15/18 13:50      Room Air  


 


8/15/18 13:50 36.5 66 15 152/75 (100) 99 Room Air  


 


8/15/18 13:50      Room Air  


 


8/15/18 13:35  64 16 144/80 100 Room Air  


 


8/15/18 13:25 36.2 67 16 145/84 100 Room Air  


 


8/15/18 13:15  59 16 145/77 100 Room Air  


 


8/15/18 13:05  60 16 143/81 99 Room Air  


 


8/15/18 12:57 36.0 60 16 141/74 98 Room Air  


 


8/15/18 07:49 36.6 63 18 148/93 99 Room Air  








Laboratory Results 24 Hours:











Test


  8/16/18


06:11


 


Hematocrit 32.0 % 


 


Hemoglobin 10.1 g/dL 











Assessment & Plan


Assessment:


POD 1 s/p Right TKA


asymptomatic bradycardia


Plan:


Medical management to assess bradycardia


PT/OT if continues to remain asymptomatic


Planning for HH PT








Inhouse Planning


Pain Management:  Morphine, PO Tylenol, Oxy IR


DVT Prophylaxis:  TEDs, SCDs, ASA





Discharge Planning


Discharge Planning:  home with home health

## 2018-08-16 NOTE — MEDICAL CONSULT
Consultation


Date of Consultation:


Aug 16, 2018.


Attending Physician:


Flo Cooper M.D.


Past Medical/Surgical History


Medical Problems:


Osteoarthritis


Anemia of chronic disease


Rheumatoid arthritis on Prednisone


BPH


CKD


Nephrolithiasis





Past Surgical History:


Inguinal hernia


Left knee


Left hip


Nasal septoplasty


CT release


Rotator cuff














Family History





FH: CAD (coronary artery disease)


  BROTHER


FH: cancer





Social History


Smoking Status:  Former Smoker


Marital Status:  


Housing Status:  lives with family


Occupation Status:  retired





Allergies


Coded Allergies:  


     Clavulanic Acid (Verified  Allergy, Unknown, AFFECTED KIDNEY, 8/15/18)


     Lisinopril (Verified  Adverse Reaction, Mild, COUGH, 8/15/18)


     Proton Pump Inhibitors (Unverified  Adverse Reaction, Unknown, 'affected 

kidneys' per chart, 8/15/18)





Current Inpatient Medications





Current Inpatient Medications








 Medications


  (Trade)  Dose


 Ordered  Sig/Juaquin


 Route  Start Time


 Stop Time Status Last Admin


Dose Admin


 


 Ascorbic Acid


  (Vitamin C Tab)  500 mg  QAM


 PO  8/16/18 09:00


 9/15/18 08:59   


 


 


 Diltiazem HCl


  (TIAzac CAP)  180 mg  QAM


 PO  8/16/18 09:00


 9/15/18 08:59   


 


 


 Folic Acid


  (Folvite Tab)  1 mg  QAM


 PO  8/16/18 09:00


 9/15/18 08:59   


 


 


 Metoprolol


 Succinate


  (Toprol Xl Tab)  25 mg  QAM


 PO  8/16/18 09:00


 9/15/18 08:59   


 


 


 Prednisone


  (PredniSONE TAB)  5 mg  DAILY


 PO  8/16/18 09:00


 9/15/18 08:59   


 


 


 Ferrous Sulfate


  (Feosol Tab)  325 mg  BID


 PO  8/15/18 21:00


 9/14/18 20:59  8/15/18 22:03


325 MG


 


 Hydrocortisone


 Sodium Succinate


 100 mg/Syringe  2 ml @ 4


 mls/min  Q8H


 IV  8/15/18 16:00


 8/16/18 08:01  8/15/18 23:36


4 MLS/MIN


 


 Potassium


 Chloride/Dextrose/


 Sod Cl  1,000 ml @ 


 100 mls/hr  Q10H


 IV  8/15/18 15:00


 8/16/18 12:55  8/15/18 20:00


100 MLS/HR


 


 Oxycodone HCl


  (Roxicodone


 Immediate Rel Tab)  1 TABLET


 FOR PAIN


 RATING...  Q4H  PRN


 PO  8/15/18 13:00


 8/29/18 12:59   


 


 


 Morphine Sulfate


  (MoRPHine


 SULFATE INJ)  2 mg  Q4H  PRN


 IV  8/15/18 13:00


 8/29/18 12:59   


 


 


 Acetaminophen


  (Tylenol Tab)  1,000 mg  Q8H


 PO  8/15/18 14:00


 9/14/18 12:59  8/15/18 22:05


1,000 MG


 


 Magnesium


 Hydroxide


  (Milk Of


 Magnesia Susp)  30 ml  Q6H  PRN


 PO  8/15/18 13:00


 9/14/18 12:59   


 


 


 Bisacodyl


  (Dulcolax Supp)  10 mg  DAILY  PRN


 ID  8/15/18 13:00


 9/14/18 12:59   


 


 


 Senna


  (Senokot Tab)  17.2 mg  HS


 PO  8/15/18 21:00


 9/14/18 20:59  8/15/18 22:03


17.2 MG


 


 Docusate Sodium


  (coLACE CAP)  100 mg  BID


 PO  8/15/18 21:00


 9/14/18 20:59  8/15/18 22:05


100 MG


 


 Multivitamins


  (Multivitamin


 Tab)  1 tab  QAM


 PO  8/16/18 09:00


 9/15/18 08:59   


 


 


 Ondansetron HCl


  (Zofran Inj)  4 mg  Q6H  PRN


 IV  8/15/18 13:00


 9/14/18 12:59   


 


 


 Silver


 Sulfadiazine


  (Silvadene 1%


 Crm 50GM Jar)  1 appln  BID  PRN


 EXT  8/15/18 13:00


 9/14/18 12:59   


 


 


 Tamsulosin HCl


  (Flomax Cap)  0.4 mg  QAM  PRN


 PO  8/15/18 13:00


 9/14/18 12:59   


 


 


 Aspirin


  (Ecotrin Tab)  81 mg  BID


 PO  8/15/18 21:00


 9/14/18 20:59  8/15/18 22:07


81 MG


 


 Haloperidol


 Lactate


  (Haldol Inj)  0.5 mg  HS  PRN


 IV  8/16/18 01:30


 9/15/18 01:29 UNV  


 











Physical Exam











  Date Time  Temp Pulse Resp B/P (MAP) Pulse Ox O2 Delivery O2 Flow Rate FiO2


 


8/15/18 23:01 36.4 63 18 161/89 (113) 97 Room Air  


 


8/15/18 19:30 36.4 72 17 149/83 (105) 98 Room Air  


 


8/15/18 16:50 36.5 74 17 139/72 (94) 96 Room Air  


 


8/15/18 15:54      Room Air  


 


8/15/18 15:50 36.5 72 17 138/69 (92) 97 Room Air  


 


8/15/18 14:57 36.3 73 17 165/88 (113) 98 Room Air  


 


8/15/18 14:25 36.4 59 18 161/85 (110) 95 Room Air  


 


8/15/18 13:50      Room Air  


 


8/15/18 13:50 36.5 66 15 152/75 (100) 99 Room Air  


 


8/15/18 13:50      Room Air  


 


8/15/18 13:35  64 16 144/80 100 Room Air  


 


8/15/18 13:25 36.2 67 16 145/84 100 Room Air  


 


8/15/18 13:15  59 16 145/77 100 Room Air  


 


8/15/18 13:05  60 16 143/81 99 Room Air  


 


8/15/18 12:57 36.0 60 16 141/74 98 Room Air  


 


8/15/18 07:49 36.6 63 18 148/93 99 Room Air

## 2018-08-16 NOTE — MEDICAL CONSULT
Consultation


Date of Consultation:


Aug 16, 2018.


Attending Physician:


Flo Cooper M.D.


Reason for Consultation:


Medical management


History of Present Illness


This is a 77 y/o male with a history of HTN, HLD, CKD stage IV, RA, BPH, h/o 

urinary retention and hydronephrosis s/p TURP, recurrent UTI, chronic anemia, 

osteopenia, and GERD who presents s/p right TKA with Dr. Cooper on 8/15 for 

medical management.  The patient reports feeling well postoperatively. He 

states his pain is very well controlled.  He denies any numbness or tingling.  

He was up walking with physical therapy and states that went well without any 

[place], dizziness, lightheadedness, chest pain, or unsteadiness.  He is tolerating 

a PO diet well.  He is voiding on his own but states that the stream is 

somewhat decreased compared to normal.  He has not yet passed gas or had a 

bowel movement.  The patient denies fevers, chills, sweats, chest pain, 

palpitations, claudication, cough, wheezing, shortness of breath, nausea, 

vomiting, abdominal pain, dysuria, hematuria, urinary retention, paralysis, 

weakness, numbness and tingling.





Per nursing, the patient did have delirium and agitation last night, but he has 

a history of this.  He is currently oriented, calm and cooperative.





Past Medical/Surgical History


Medical Problems:


(1) Acute renal failure


Status: Acute  





(2) Acute urinary retention


Status: Acute  





(3) Complication of catheter


Status: Acute  





(4) Encounter for Lutz catheter replacement


Status: Acute  





(5) Leukocytosis


Status: Acute  





(6) Urinary tract infection


Status: Acute  





(7) UTI (urinary tract infection)


Status: Acute  





HTN


HLD


CKD stage IV


RA


BPH


Urinary retention and hydronephrosis s/p TURP


Recurrent UTI


Chronic anemia


Osteopenia


GERD





Surgical history:


Hernia Repair


Nasal septoplasty


Rotator cuff repair 


Carpal tunnel release





Family History





FH: CAD (coronary artery disease)


  BROTHER


FH: cancer (prostate, breast)


Hypertension


Myocardial infarction





Social History


Smoking Status:  Former Smoker (quit 40 years ago)


Smokeless Tobacco Use:  No


Alcohol Use:  occasionally (wine)


Marital Status:  


Housing Status:  lives with significant other


Occupation Status:  retired





Allergies


Coded Allergies:  


     Clavulanic Acid (Verified  Allergy, Unknown, AFFECTED KIDNEY, 8/15/18)


     Lisinopril (Verified  Adverse Reaction, Mild, COUGH, 8/15/18)


     Proton Pump Inhibitors (Unverified  Adverse Reaction, Unknown, 'affected 

kidneys' per chart, 8/15/18)





Current Inpatient Medications





Current Inpatient Medications








 Medications


  (Trade)  Dose


 Ordered  Sig/Juaquin


 Route  Start Time


 Stop Time Status Last Admin


Dose Admin


 


 Ascorbic Acid


  (Vitamin C Tab)  500 mg  QAM


 PO  8/16/18 09:00


 9/15/18 08:59   


 


 


 Diltiazem HCl


  (TIAzac CAP)  180 mg  QAM


 PO  8/16/18 09:00


 9/15/18 08:59   


 


 


 Folic Acid


  (Folvite Tab)  1 mg  QAM


 PO  8/16/18 09:00


 9/15/18 08:59   


 


 


 Metoprolol


 Succinate


  (Toprol Xl Tab)  25 mg  QAM


 PO  8/16/18 09:00


 9/15/18 08:59   


 


 


 Prednisone


  (PredniSONE TAB)  5 mg  DAILY


 PO  8/16/18 09:00


 9/15/18 08:59   


 


 


 Ferrous Sulfate


  (Feosol Tab)  325 mg  BID


 PO  8/15/18 21:00


 9/14/18 20:59  8/15/18 22:03


325 MG


 


 Potassium


 Chloride/Dextrose/


 Sod Cl  1,000 ml @ 


 100 mls/hr  Q10H


 IV  8/15/18 15:00


 8/16/18 12:55  8/16/18 05:38


100 MLS/HR


 


 Oxycodone HCl


  (Roxicodone


 Immediate Rel Tab)  1 TABLET


 FOR PAIN


 RATING...  Q4H  PRN


 PO  8/15/18 13:00


 8/29/18 12:59   


 


 


 Morphine Sulfate


  (MoRPHine


 SULFATE INJ)  2 mg  Q4H  PRN


 IV  8/15/18 13:00


 8/29/18 12:59   


 


 


 Acetaminophen


  (Tylenol Tab)  1,000 mg  Q8H


 PO  8/15/18 14:00


 9/14/18 12:59  8/16/18 05:39


1,000 MG


 


 Magnesium


 Hydroxide


  (Milk Of


 Magnesia Susp)  30 ml  Q6H  PRN


 PO  8/15/18 13:00


 9/14/18 12:59   


 


 


 Bisacodyl


  (Dulcolax Supp)  10 mg  DAILY  PRN


 [place]  8/15/18 13:00


 9/14/18 12:59   


 


 


 Senna


  (Senokot Tab)  17.2 mg  HS


 PO  8/15/18 21:00


 9/14/18 20:59  8/15/18 22:03


17.2 MG


 


 Docusate Sodium


  (coLACE CAP)  100 mg  BID


 PO  8/15/18 21:00


 9/14/18 20:59  8/15/18 22:05


100 MG


 


 Multivitamins


  (Multivitamin


 Tab)  1 tab  QAM


 PO  8/16/18 09:00


 9/15/18 08:59   


 


 


 Ondansetron HCl


  (Zofran Inj)  4 mg  Q6H  PRN


 IV  8/15/18 13:00


 9/14/18 12:59   


 


 


 Silver


 Sulfadiazine


  (Silvadene 1%


 Crm 50GM Jar)  1 appln  BID  PRN


 EXT  8/15/18 13:00


 9/14/18 12:59   


 


 


 Tamsulosin HCl


  (Flomax Cap)  0.4 mg  QAM  PRN


 PO  8/15/18 13:00


 9/14/18 12:59   


 


 


 Aspirin


  (Ecotrin Tab)  81 mg  BID


 PO  8/15/18 21:00


 9/14/18 20:59  8/15/18 22:07


81 MG


 


 Haloperidol


 Lactate


  (Haldol Inj)  0.5 mg  HS  PRN


 IM  8/16/18 02:00


 9/15/18 01:59   


 











Review of Systems


See HPI for pertinent positives and negatives.  All other systems reviewed and 

negative.





Physical Exam











  Date Time  Temp Pulse Resp B/P (MAP) Pulse Ox O2 Delivery O2 Flow Rate FiO2


 


8/16/18 07:50      Room Air  


 


8/16/18 07:41 36.6 45 16 151/76 (101) 98 Room Air  


 


8/16/18 07:20  48      


 


8/16/18 02:53 36.6 81 16 152/77 (102) 96 Room Air  


 


8/15/18 23:40      Room Air  


 


8/15/18 23:01 36.4 63 18 161/89 (113) 97 Room Air  


 


8/15/18 19:30 36.4 72 17 149/83 (105) 98 Room Air  


 


8/15/18 16:50 36.5 74 17 139/72 (94) 96 Room Air  


 


8/15/18 15:54      Room Air  


 


8/15/18 15:50 36.5 72 17 138/69 (92) 97 Room Air  


 


8/15/18 14:57 36.3 73 17 165/88 (113) 98 Room Air  


 


8/15/18 14:25 36.4 59 18 161/85 (110) 95 Room Air  


 


8/15/18 13:50      Room Air  


 


8/15/18 13:50 36.5 66 15 152/75 (100) 99 Room Air  


 


8/15/18 13:50      Room Air  


 


8/15/18 13:35  64 16 144/80 100 Room Air  


 


8/15/18 13:25 36.2 67 16 145/84 100 Room Air  


 


8/15/18 13:15  59 16 145/77 100 Room Air  


 


8/15/18 13:05  60 16 143/81 99 Room Air  


 


8/15/18 12:57 36.0 60 16 141/74 98 Room Air  








General appearance:  Well-developed, well-nourished, no apparent distress


Head:  Normocephalic, atraumatic


Eyes:  Normal inspection, PERRL, EOMI


ENT:  Normal ENT inspection, hearing grossly normal, pharynx normal


Neck:  Supple, no JVD, trachea midline


Respiratory/Chest:  Lungs clear to auscultation, normal breath sounds, no 

respiratory distress


Cardiovascular:  +Systolic murmur. Bradycardic.  Regular rhythm, no gallop


Abdomen/GI:  Normal bowel sounds, non-tender, soft


Extremities/Musculoskeletal:  +RLE wrapped in ace bandage, hemovac in place.  

No calf tenderness, no pedal edema


Neurological/Psych:  Alert, normal mood/affect, oriented x 3


Skin:  Normal color, warm/dry, no rash





Laboratory Results





Last 24 Hours








Test


  8/16/18


06:11


 


White Blood Count 16.01 K/uL 


 


Red Blood Count 3.53 M/uL 


 


Hemoglobin 10.1 g/dL 


 


Hematocrit 32.0 % 


 


Mean Corpuscular Volume 90.7 fL 


 


Mean Corpuscular Hemoglobin 28.6 pg 


 


Mean Corpuscular Hemoglobin


Concent 31.6 g/dl 


 


 


RDW Standard Deviation 50.3 fL 


 


RDW Coefficient of Variation 15.1 % 


 


Platelet Count 246 K/uL 


 


Mean Platelet Volume 10.4 fL 


 


Sodium Level 137 mmol/L 


 


Potassium Level 4.8 mmol/L 


 


Chloride Level 108 mmol/L 


 


Carbon Dioxide Level 23 mmol/L 


 


Anion Gap 6.0 mmol/L 


 


Blood Urea Nitrogen 42 mg/dl 


 


Creatinine 2.28 mg/dl 


 


Est Creatinine Clear Calc


Drug Dose 23.2 ml/min 


 


 


Estimated GFR (


American) 30.7 


 


 


Estimated GFR (Non-


American 26.5 


 


 


BUN/Creatinine Ratio 18.6 


 


Random Glucose 155 mg/dl 


 


Calcium Level 8.8 mg/dl 











Assessment & Plan


77 y/o male with a history of HTN, HLD, CKD stage IV, RA, BPH, h/o urinary 

retention and hydronephrosis s/p TURP, recurrent UTI, chronic anemia, osteopenia

, and GERD who presents s/p right TKA with Dr. Cooper on 8/15 for medical 

management.





S/p right TKA--POD #1


-Pain management, DVT prophylaxis, and PT/OT as per primary team





Delirium--resolved


-Pt had some postop delirium/agitation last night, per nursing he has a history 

of this after surgery


-Haldol 0.5 mg IM hs prn agitation ordered





Asymptomatic bradycardia


-HR in 40s this morning.  Pt states he has always had a slower HR even when he 

was young


-Hold Toprol and diltiazem for now


-EKG unremarkable, sinus bradycardia in the 50s





HTN, HLD--stable


-Hold Toprol and diltiazem as above given bradycardia, continue to hold for HR 

less than 60


-Cover with hydralazine 10 mg IV q6h prn SBP >180





CKD stage IV--stable


-Baseline creatinine is highly variable, between 1.9-2.5


-Creatinine 2.28 on 8/16, similar to preop labs (2.15 at that time)





RA--stable


-Continue prednisone 5 mg PO qd


-Hemodynamically stable, would not give further stress dose steroids at this 

time





BPH, h/o urinary retention and hydronephrosis s/p recent TURP--stable, voiding 

on his own postop


-Pt follows with urology, had recent TURP due to enlarged prostate causing 

urinary retention, hydronephrosis and LINDA.  May need another TURP per outpt 

records





Anemia of chronic disease, PIERCE--stable


-Baseline Hgb 9-10


-Hgb 10.1 on 8/16


-Continue iron supplement and vitamin C





Osteopenia


-Continue vitamin D supplement





GERD


-Pt states he no longer takes Zantac, does not tolerate PPIs per allergy list





Code Status


-Level I, FULL RESUSCITATION STATUS





Thank you for this consultation.  We will continue to follow.

## 2018-08-17 VITALS
TEMPERATURE: 98.06 F | HEART RATE: 57 BPM | DIASTOLIC BLOOD PRESSURE: 89 MMHG | OXYGEN SATURATION: 99 % | SYSTOLIC BLOOD PRESSURE: 165 MMHG

## 2018-08-17 VITALS
TEMPERATURE: 98.06 F | OXYGEN SATURATION: 99 % | HEART RATE: 57 BPM | SYSTOLIC BLOOD PRESSURE: 165 MMHG | DIASTOLIC BLOOD PRESSURE: 89 MMHG

## 2018-08-17 VITALS — DIASTOLIC BLOOD PRESSURE: 91 MMHG | SYSTOLIC BLOOD PRESSURE: 163 MMHG

## 2018-08-17 LAB
BUN SERPL-MCNC: 42 MG/DL (ref 7–18)
CALCIUM SERPL-MCNC: 8.9 MG/DL (ref 8.5–10.1)
CO2 SERPL-SCNC: 25 MMOL/L (ref 21–32)
CREAT SERPL-MCNC: 2.09 MG/DL (ref 0.6–1.4)
EOSINOPHIL NFR BLD AUTO: 208 K/UL (ref 130–400)
GLUCOSE SERPL-MCNC: 82 MG/DL (ref 70–99)
HCT VFR BLD CALC: 30.5 % (ref 42–52)
HGB BLD-MCNC: 9.5 G/DL (ref 14–18)
MCH RBC QN AUTO: 28 PG (ref 25–34)
MCHC RBC AUTO-ENTMCNC: 31.1 G/DL (ref 32–36)
MCV RBC AUTO: 90 FL (ref 80–100)
PMV BLD AUTO: 10.3 FL (ref 7.4–10.4)
POTASSIUM SERPL-SCNC: 4.3 MMOL/L (ref 3.5–5.1)
RED CELL DISTRIBUTION WIDTH CV: 15.2 % (ref 11.5–14.5)
RED CELL DISTRIBUTION WIDTH SD: 49.7 FL (ref 36.4–46.3)
SODIUM SERPL-SCNC: 141 MMOL/L (ref 136–145)
WBC # BLD AUTO: 11.4 K/UL (ref 4.8–10.8)

## 2018-08-17 RX ADMIN — Medication SCH TAB: at 08:13

## 2018-08-17 RX ADMIN — DILTIAZEM HYDROCHLORIDE SCH MG: 180 CAPSULE, EXTENDED RELEASE ORAL at 08:15

## 2018-08-17 RX ADMIN — FERROUS SULFATE TAB EC 325 MG (65 MG FE EQUIVALENT) SCH MG: 325 (65 FE) TABLET DELAYED RESPONSE at 08:14

## 2018-08-17 RX ADMIN — TRAMADOL HYDROCHLORIDE PRN MG: 50 TABLET, COATED ORAL at 04:09

## 2018-08-17 RX ADMIN — ACETAMINOPHEN SCH MG: 500 TABLET, COATED ORAL at 05:35

## 2018-08-17 RX ADMIN — DOCUSATE SODIUM SCH MG: 100 CAPSULE, LIQUID FILLED ORAL at 08:12

## 2018-08-17 RX ADMIN — OXYCODONE HYDROCHLORIDE AND ACETAMINOPHEN SCH MG: 500 TABLET ORAL at 08:14

## 2018-08-17 RX ADMIN — METOPROLOL SUCCINATE SCH MG: 25 TABLET, EXTENDED RELEASE ORAL at 08:12

## 2018-08-17 RX ADMIN — Medication SCH MG: at 08:14

## 2018-08-17 RX ADMIN — TRAMADOL HYDROCHLORIDE PRN MG: 50 TABLET, COATED ORAL at 08:13

## 2018-08-17 RX ADMIN — TRAMADOL HYDROCHLORIDE PRN MG: 50 TABLET, COATED ORAL at 13:13

## 2018-08-17 NOTE — ORTHOPEDIC PROGRESS NOTE
Orthopedic Progress Note


Date of Service


Aug 17, 2018.





Subjective


Post OP Day:  2


Reports: feeling well


Additional Notes:


Mild knee soreness today but otherwise feeling well.





Objective


calves soft nontender, N/V intact, dressing C/D/I (Silverlon intact), A&O x3, 

toes mobile











  Date Time  Temp Pulse Resp B/P (MAP) Pulse Ox O2 Delivery O2 Flow Rate FiO2


 


8/17/18 08:22      Room Air  


 


8/17/18 06:47 36.7 57 16 165/89 (114) 99 Room Air  


 


8/17/18 00:12    163/91 (115)    


 


8/16/18 22:48 36.7 60 16 182/87 (118) 97 Room Air  


 


8/16/18 19:45      Room Air  


 


8/16/18 15:25 36.6 70 16 153/88 (109) 96 Room Air  


 


8/16/18 13:14  71  168/74 (105)    


 


8/16/18 11:58 36.4 46 16 170/84 (112) 99 Room Air  


 


8/16/18 11:54  70   100   








Laboratory Results 24 Hours:











Test


  8/17/18


05:31


 


Hematocrit 30.5 % 


 


Hemoglobin 9.5 g/dL 











Assessment & Plan


Assessment:


POD 2 s/p Right TKA


asymptomatic bradycardia


Plan:


Medical management 


PT/OT 


Planning for HH PT


Plan for dc today if ok with Med Service








Inhouse Planning


Pain Management:  Morphine, PO Tylenol, Oxy IR


DVT Prophylaxis:  TEDs, SCDs, ASA





Discharge Planning


Discharge Planning:  home with home health

## 2018-08-17 NOTE — HOSPITALIST PROGRESS NOTE
Hospitalist Progress Note


Date of Service


Aug 17, 2018.





Subjective


Pt evaluation today including:  conversation w/ patient, physical exam, chart 

review, lab review, review of inpatient medication list


Pain:  Controlled


PO Intake:  Tolerating PO diet


Voiding:  no voiding problems


Patient reports feeling well.  Did not appear to have any significant delirium 

overnight and patient oriented now.  He states his pain is well controlled.  He 

is tolerating a PO diet and voiding on his own.  He is passing gas but has not 

yet moved his bowels.  The patient denies fevers, chills, sweats, chest pain, 

palpitations, claudication, cough, wheezing, shortness of breath, nausea, 

vomiting, abdominal pain, dysuria, hematuria, urinary retention, paralysis, 

weakness, numbness and tingling.





   Additional Comments:


See HPI for pertinent positives and negatives.  All other systems reviewed and 

negative.





Objective


Vital Signs











  Date Time  Temp Pulse Resp B/P (MAP) Pulse Ox O2 Delivery O2 Flow Rate FiO2


 


8/17/18 08:22      Room Air  


 


8/17/18 06:47 36.7 57 16 165/89 (114) 99 Room Air  


 


8/17/18 00:12    163/91 (115)    


 


8/16/18 22:48 36.7 60 16 182/87 (118) 97 Room Air  


 


8/16/18 19:45      Room Air  


 


8/16/18 15:25 36.6 70 16 153/88 (109) 96 Room Air  


 


8/16/18 13:14  71  168/74 (105)    


 


8/16/18 11:58 36.4 46 16 170/84 (112) 99 Room Air  


 


8/16/18 11:54  70   100   











Physical Exam


Notes:


General appearance:  Well-developed, well-nourished, no apparent distress


Head:  Normocephalic, atraumatic


Eyes:  Normal inspection, PERRL, EOMI


ENT:  Normal ENT inspection, hearing grossly normal, pharynx normal


Neck:  Supple, no JVD, trachea midline


Respiratory/Chest:  Lungs clear to auscultation, normal breath sounds, no 

respiratory distress


Cardiovascular:  +Systolic murmur.  Regular rate & rhythm, no gallop


Abdomen/GI:  Normal bowel sounds, non-tender, soft


Extremities/Musculoskeletal:  +RLE wrapped in ace bandage.  No calf tenderness, 

no pedal edema


Neurological/Psych:  Alert, normal mood/affect, oriented x 3


Skin:  Normal color, warm/dry, no rash





Laboratory Results





Last 24 Hours








Test


  8/17/18


05:31


 


White Blood Count 11.40 K/uL 


 


Red Blood Count 3.39 M/uL 


 


Hemoglobin 9.5 g/dL 


 


Hematocrit 30.5 % 


 


Mean Corpuscular Volume 90.0 fL 


 


Mean Corpuscular Hemoglobin 28.0 pg 


 


Mean Corpuscular Hemoglobin


Concent 31.1 g/dl 


 


 


RDW Standard Deviation 49.7 fL 


 


RDW Coefficient of Variation 15.2 % 


 


Platelet Count 208 K/uL 


 


Mean Platelet Volume 10.3 fL 


 


Sodium Level 141 mmol/L 


 


Potassium Level 4.3 mmol/L 


 


Chloride Level 109 mmol/L 


 


Carbon Dioxide Level 25 mmol/L 


 


Anion Gap 8.0 mmol/L 


 


Blood Urea Nitrogen 42 mg/dl 


 


Creatinine 2.09 mg/dl 


 


Est Creatinine Clear Calc


Drug Dose 25.3 ml/min 


 


 


Estimated GFR (


American) 34.1 


 


 


Estimated GFR (Non-


American 29.4 


 


 


BUN/Creatinine Ratio 20.1 


 


Random Glucose 82 mg/dl 


 


Calcium Level 8.9 mg/dl 











Assessment and Plan


79 y/o male with a history of HTN, HLD, CKD stage IV, RA, BPH, h/o urinary 

retention and hydronephrosis s/p TURP, recurrent UTI, chronic anemia, osteopenia

, and GERD who presents s/p right TKA with Dr. Cooper on 8/15 for medical 

management.





S/p right TKA--POD #2


-Pain management, DVT prophylaxis, and PT/OT as per primary team





Delirium--resolved


-Pt had some postop delirium/agitation first night after surgery, per nursing 

he has a history of this after surgery


-Haldol 0.5 mg IM hs prn agitation ordered





Asymptomatic bradycardia--improving


-HR improved today, able to take his metoprolol and diltiazem


-EKG unremarkable, sinus bradycardia in the 50s





HTN, HLD--stable


-Continue Toprol 25 mg PO qd and diltiazem 180 mg PO qd


-Cover with hydralazine 10 mg IV q6h prn SBP >180





CKD stage IV--stable


-Baseline creatinine is highly variable, between 1.9-2.5


-Creatinine 2.09 on 8/17, down from 2.28





RA--stable


-Continue prednisone 5 mg PO qd


-Hemodynamically stable, would not give further stress dose steroids at this 

time





BPH, h/o urinary retention and hydronephrosis s/p recent TURP--stable, voiding 

on his own postop


-Pt follows with urology, had recent TURP due to enlarged prostate causing 

urinary retention, hydronephrosis and LINDA.  May need another TURP per outpt 

records





Anemia of chronic disease, PIERCE--stable


-Baseline Hgb 9-10


-Hgb 9.5 on 8/17


-Continue iron supplement and vitamin C





Osteopenia


-Continue vitamin D supplement





GERD


-Pt states he no longer takes Zantac, does not tolerate PPIs per allergy list





Code Status


-Level I, FULL RESUSCITATION STATUS








Pt. is stable from a medical standpoint, we will sign off.  Clear for discharge 

as per primary team.

## 2018-08-17 NOTE — DISCHARGE INSTRUCTIONS
Discharge Instructions


Date of Service


Aug 17, 2018.





Admission


Reason for Admission:  Right Knee Djd





Discharge


Discharge Diagnosis / Problem:  Right Knee Djd





Discharge Goals


Goal(s):  Decrease discomfort, Improve function, Increase independence





Activity Recommendations


Activity Limitations:  per Instructions/Follow-up section


Weightbearing Status:  Right weightbearing (as tolerated)





.





Instructions / Follow-Up


Instructions / Follow-Up


ACTIVITY RECOMMENDATIONS:





SELF CARE INSTRUCTIONS AFTER TOTAL KNEE REPLACEMENT





A.  You may need to continue a physical therapy program after discharge from 

the hospital.  There are several options available to you. 


      Your doctor will assist you in selecting the best one for you.





   1.  An out-patient facility 2 to 3 times a week for therapy or home therapy.


   2.  Continue working on all exercises taught to you in the hospital.  Your


                 goals should be to increase bending of your knee to 90 degrees 

and


                 beyond and to fully straighten your knee.





B.  You may progress at your own pace from walking with a walker or crutches to 

a cane; then to no assistive devices.





C.   Make walking a part of your daily routine.  Be up as much as comfortable 

with rest periods throughout the day.  


      Rest with leg elevation is very important. 


      Use the ice wrap frequently for the first 3-4 weeks.





D.  There are no restrictions on activities.  You may ride in a car, shop, 

participate in household chores and all social activities.





E.  Wear the long elastic stockings (ELVIRA hose) 20 hours a day for 2 weeks after 

surgery.  


     They can be removed several times a day for laundering and for a bath.





F.  You may shower, no tub baths until cleared by your doctor.








SPECIAL CARE INSTRUCTIONS:





**VERY IMPORTANT TO READ AND REVIEW**





A.  There are a few signs you need to watch for after you are home.  Call  

[facility] if you notice any of the followin.  Increased severe knee pain.  Some pain is expected especially  when you 

exercise.


   2.  Increased swelling in your leg or knee; pain or swelling of the calf 

muscle in either lower leg.


   3.  Any fluid drainage from the incision.


   4.  Shortness of breath or chest pain.





B.  Please call [facility] at [phone] if you have any  

concerns or questions about your operation or recovery.  


     The doctor or his nurse will return your call promptly.





C.  You must take antibiotics before dental work, bladder, bowel or other 

surgery.  


      Your doctor will provide you with a permanent care to carry describing 

this precaution.





IMPORTANT:





*  REMEMBER TO TAKE ASPIRIN, 81 MG, TWICE DAILY FOR 4 WEEKS UNLESS OTHERWISE 

DIRECTED.  


   THIS IS YOUR BLOOD THINNER.





*  HIGH RISK PATIENTS MAY BE PRESCRIBED A STRONGER BLOOD THINNER.  


   THIS WILL BE PROVIDED AT DISCHARGE.





*  CALL IF INCREASED PAIN, REDNESS, DRAINAGE OR FEVER GREATER THAT 101.





*  WEAR ELVIRA HOSE 20 HOURS PER DAY FOR 2 WEEKS.





* Silverlon-  This is a large adhesive bandage that contains silver ions.  This 

helps your incision heal by fighting off bacteria and protecting it from the 

outside environment.  You are permitted to shower with this dressing.  This 

will remain on your incision for 7 days and then should be removed.  Some 

visible blood or drainage through the dressing window is normal.  If there is 

significant drainage or leaking noted before the 7 days notify your doctor's 

office immediately.  Once removed, keep incision clean and dry.  If there is 

any drainage or redness noted, please call your surgeon. 


 [phone].








FOLLOW UP VISIT:





If appointment is not already scheduled:





Please call [facility] to make a follow-up appointment for 


2 weeks after your surgery at [phone].





Current Hospital Diet


Patient's current hospital diet: Regular Diet





Discharge Diet


Recommended Diet:  Regular Diet





Procedures


Procedures Performed:  


Right Total Knee Arthroplasty





Pending Studies


Studies pending at discharge:  no





Laboratory Results





Hemoglobin A1c








Test


  18


14:50 Range/Units


 


 


Estimated Average Glucose 120   mg/dl


 


Hemoglobin A1c 5.8 H 4.5-5.6  %











Medical Emergencies








.


Who to Call and When:





Medical Emergencies:  If at any time you feel your situation is an emergency, 

please call 911 immediately.





.





Non-Emergent Contact


Non-Emergency issues call your:  Surgeon


Call Non-Emergent contact if:  temperature is above 101.5, your pain is not 

controlled, your pain is worsening, wound has increased drainage, wound has 

increased redness


.








"Provider Documentation" section prepared by Mekhi Santos.








.





[place] Drug Monitoring Program


Search Results:  patient reviewed within database, no issues identified

## 2018-08-18 NOTE — DISCHARGE SUMMARY
Orthopedic Discharge Summary


Admission Date/Reason


Aug 15, 2018 at 10:00


Right knee Osteoarthritis





Discharge Date/Disposition


Aug 17, 2018


Home with services





Diagnosis


Principal Diagnosis:


Right knee Osteoarthritis


Secondary Diagnoses/Problems:


-Hypertension


-CKD IV


-Rheumatoid arthritis


-BPH


-GERD


-Chronic anemia


-Asymptomatic bradycardia





Procedure(s) Performed


Right Total Knee Arthroplasty





Consultations


[place]. [place] [facility] [place]





Medication Reconciliation


New Medications:  


Aspirin (Aspirin) 81 Mg Tab


81 MG PO BID for 30 Days, #60 TAB





Senna (Senokot) 8.6 Mg Tab


17.2 MG PO HS, #30 TAB


HOld for loose stools


Tramadol HCl (Tramadol HCl) 50 Mg Tab


 MG PO Q4H PRN for Pain, #36 TAB





 


Changed Medications:  


Acetaminophen (Tylenol) 500 Mg Tab


1000 MG PO Q8H PRN for Pain for 14 Days, #84 TAB (Changed from: UD)


Resume your regular dose of tylenol after 14 days


 


Continued Medications:  


Ascorbic Acid (Ascorbic Acid) 500 Mg Tab


500 MG PO QAM, TAB





Cholecalciferol (Vitamin D3) 1,000 Unit Tab


1 TAB PO BID for 90 Days, #180 TAB 3 Refills





Diltiazem Hcl Ext Rel (Tiazac) 180 Mg Capcr


180 MG PO QAM, CAP





Docusate Sodium (Colace) 100 Mg Cap


100 MG PO HS for 15 Days, CAP





Ferrous Sulfate (Kp Ferrous Sulfate) 325 Mg Tab


325 MG PO BID for 30 Days, #60 TAB 3 Refills





Folic Acid (Folic Acid) 1 Mg Tab


1 MG PO QAM





Metoprolol Succ (Toprol Xl) (Toprol-Xl) 25 Mg Tabcr


25 MG PO QAM, #30 TAB





Multivitamin (Multivitamin)  Tab


1 TABLET PO QAM, TAB





Prednisone Tab (Prednisone) 10 Mg Tab


5 MG PO QAM, TAB











Admission Physical Exam


As per Admitting History & Physical.





Hospital Course


Patient presented for same day admission following Right total knee 

arthroplasty on 8/15/18. He tolerated procedure well. Post-operatively, patient 

received a stress dose of hydrocortisone 100mg IV every 8 hours for 3 doses per 

his PCP's recommendations. On POD#0 patient was given Haldol as needed for post 

operative delirium. This resolved by POD#1. He did have an EKG done for 

asymptomatic bradycardia on POD#1 due to HR in the 40's. EKG showed sinus 

bradycardia with HR in the 50's. Patient stated he normally has a low heart 

rate. Dr. Roe Cyr of medical service was consulted for medical management 

during admission. His activity was progressed and well tolerated throughout his 

hospital stay. Please refer to daily progress notes and PT notes for complete 

details. After exam on 8/17/18, patient was felt to be stable for discharge 

home with home health services. Patient will f/u in the office with Dr. Cooper 

or his PA in about 2 weeks for further evaluation including x-rays and incision 

check, sooner if having any issues or concerns.





Discharge Instructions


Please refer to the electronic Patient Visit Report (Discharge Instructions) 

for additional information.

## 2023-10-16 NOTE — DISCHARGE INSTRUCTIONS
Check your blood sugar 4 times daily, I sent the meter over to the pharmacy   Discharge Instructions


Date of Service


Dec 15, 2017.





Admission


Reason for Admission:  Left Knee Osteoarthritis





Discharge


Discharge Diagnosis / Problem:  LEFT TKA





Discharge Goals


Goal(s):  Improve function





Activity Recommendations


Activity Limitations:  as noted below





.





Instructions / Follow-Up


Instructions / Follow-Up


ACTIVITY RECOMMENDATIONS:





SELF CARE INSTRUCTIONS AFTER TOTAL KNEE REPLACEMENT





A.  You may need to continue a physical therapy program after discharge from 

the hospital.  There are several options available to you. 


      Your doctor will assist you in selecting the best one for you.





   1.  An out-patient facility 2 to 3 times a week for therapy or home therapy.


   2.  Continue working on all exercises taught to you in the hospital.  Your


                 goals should be to increase bending of your knee to 90 degrees 

and


                 beyond and to fully straighten your knee.





B.  You may progress at your own pace from walking with a walker or crutches to 

a cane; then to no assistive devices.





C.   Make walking a part of your daily routine.  Be up as much as comfortable 

with rest periods throughout the day.  


      Rest with leg elevation is very important. 


      Use the ice wrap frequently for the first 3-4 weeks.





D.  There are no restrictions on activities.  You may ride in a car, shop, 

participate in household chores and all social activities.





E.  Wear the long elastic stockings (ELVIRA hose) 20 hours a day for 2 weeks after 

surgery.  


     They can be removed several times a day for laundering and for a bath.





F.  You may shower, no tub baths until cleared by your doctor.








SPECIAL CARE INSTRUCTIONS:





**VERY IMPORTANT TO READ AND REVIEW**





A.  There are a few signs you need to watch for after you are home.  Call  

[facility] if you notice any of the followin.  Increased severe knee pain.  Some pain is expected especially  when you 

exercise.


   2.  Increased swelling in your leg or knee; pain or swelling of the calf 

muscle in either lower leg.


   3.  Any fluid drainage from the incision.


   4.  Shortness of breath or chest pain.





B.  Please call [facility] at [phone] if you have any  

concerns or questions about your operation or recovery.  


     The doctor or his nurse will return your call promptly.





C.  You must take antibiotics before dental work, bladder, bowel or other 

surgery.  


      Your doctor will provide you with a permanent care to carry describing 

this precaution.





IMPORTANT:





*  REMEMBER TO TAKE ASPIRIN, 81 MG, TWICE DAILY FOR 4 WEEKS UNLESS OTHERWISE 

DIRECTED.  


   THIS IS YOUR BLOOD THINNER.





*  HIGH RISK PATIENTS MAY BE PRESCRIBED A STRONGER BLOOD THINNER.  


   THIS WILL BE PROVIDED AT DISCHARGE.





*  CALL IF INCREASED PAIN, REDNESS, DRAINAGE OR FEVER GREATER THAT 101.





*  WEAR ELVIRA HOSE 20 HOURS PER DAY FOR 2 WEEKS.





*  YOU MAY HAVE A LARGE BAND-AID LIKE DRESSING (SILVERON).  THIS WILL  REMAIN 

ON YOUR INCISION FOR 7 DAYS, THEN CAN BE REMOVED. 


    IF INCISION IS LEAKING THROUGH DRESSING, CALL THE OFFICE [phone].








FOLLOW UP VISIT:





If appointment is not already scheduled:





Please call [facility] to make a follow-up appointment for 


2 weeks after your surgery at [phone].





Current Hospital Diet


Patient's current hospital diet: Regular Diet





Discharge Diet


Recommended Diet:  Regular Diet





Procedures


Procedures Performed:  


Left Total Knee Arthroplasty





Pending Studies


Studies pending at discharge:  no





Laboratory Results





Hemoglobin A1c








Test


  17


11:51 Range/Units


 


 


Estimated Average Glucose 103   mg/dl


 


Hemoglobin A1c 5.2  4.5-5.6  %











Medical Emergencies








.


Who to Call and When:





Medical Emergencies:  If at any time you feel your situation is an emergency, 

please call 911 immediately.





.





Non-Emergent Contact


Non-Emergency issues call your:  Primary Care Provider


.








"Provider Documentation" section prepared by Miguelangel Shen.








.





VTE Core Measure


Inpt VTE Proph given/why not?:  Other Anticoagulation (ASA), T.E.D. Stockings, 

SCD's





[place] Drug Monitoring Program


Search Results:  patient reviewed within database, no issues identified